# Patient Record
Sex: MALE | Race: BLACK OR AFRICAN AMERICAN | NOT HISPANIC OR LATINO
[De-identification: names, ages, dates, MRNs, and addresses within clinical notes are randomized per-mention and may not be internally consistent; named-entity substitution may affect disease eponyms.]

---

## 2017-01-13 ENCOUNTER — APPOINTMENT (OUTPATIENT)
Dept: INFECTIOUS DISEASE | Facility: CLINIC | Age: 47
End: 2017-01-13

## 2017-01-13 VITALS
SYSTOLIC BLOOD PRESSURE: 128 MMHG | WEIGHT: 225 LBS | RESPIRATION RATE: 16 BRPM | BODY MASS INDEX: 27.98 KG/M2 | HEART RATE: 86 BPM | HEIGHT: 75 IN | DIASTOLIC BLOOD PRESSURE: 79 MMHG | TEMPERATURE: 98.1 F | OXYGEN SATURATION: 98 %

## 2017-01-13 DIAGNOSIS — Z23 ENCOUNTER FOR IMMUNIZATION: ICD-10-CM

## 2017-01-17 LAB
25(OH)D3 SERPL-MCNC: 37.5 NG/ML
ADJUSTED MITOGEN: 7.22 IU/ML
ADJUSTED TB AG: 0.02 IU/ML
ALBUMIN SERPL ELPH-MCNC: 4.8 G/DL
ALP BLD-CCNC: 76 U/L
ALT SERPL-CCNC: 16 U/L
ANION GAP SERPL CALC-SCNC: 12 MMOL/L
APPEARANCE: ABNORMAL
AST SERPL-CCNC: 21 U/L
BACTERIA: NEGATIVE
BASOPHILS # BLD AUTO: 0.02 K/UL
BASOPHILS NFR BLD AUTO: 0.6 %
BILIRUB SERPL-MCNC: 0.5 MG/DL
BILIRUBIN URINE: NEGATIVE
BLOOD URINE: NEGATIVE
BUN SERPL-MCNC: 7 MG/DL
CALCIUM SERPL-MCNC: 10.1 MG/DL
CD3 CELLS # BLD: 1410 /UL
CD3 CELLS NFR BLD: 72 %
CD3+CD4+ CELLS # BLD: 324 /UL
CD3+CD4+ CELLS NFR BLD: 16 %
CD3+CD4+ CELLS/CD3+CD8+ CLL SPEC: 0.32 RATIO
CD3+CD8+ CELLS # SPEC: 1014 /UL
CD3+CD8+ CELLS NFR BLD: 52 %
CHLORIDE SERPL-SCNC: 102 MMOL/L
CHOLEST SERPL-MCNC: 278 MG/DL
CHOLEST/HDLC SERPL: 6.5 RATIO
CO2 SERPL-SCNC: 27 MMOL/L
COLOR: YELLOW
CREAT SERPL-MCNC: 1.34 MG/DL
EOSINOPHIL # BLD AUTO: 0.11 K/UL
EOSINOPHIL NFR BLD AUTO: 3.2 %
ERYTHROCYTE [SEDIMENTATION RATE] IN BLOOD BY WESTERGREN METHOD: 14 MM/HR
G6PD SER-CCNC: 8.5 U/G HB
GLUCOSE QUALITATIVE U: NORMAL MG/DL
GLUCOSE SERPL-MCNC: 95 MG/DL
HBA1C MFR BLD HPLC: 4.8 %
HBV SURFACE AB SER QL: NONREACTIVE
HCT VFR BLD CALC: 37.9 %
HDLC SERPL-MCNC: 43 MG/DL
HGB BLD-MCNC: 12.5 G/DL
HIV1 RNA # SERPL NAA+PROBE: NOT DETECTED COPIES/ML
HYALINE CASTS: 0 /LPF
IMM GRANULOCYTES NFR BLD AUTO: 0.3 %
KETONES URINE: NEGATIVE
LDLC SERPL CALC-MCNC: 183 MG/DL
LEUKOCYTE ESTERASE URINE: NEGATIVE
LYMPHOCYTES # BLD AUTO: 1.95 K/UL
LYMPHOCYTES NFR BLD AUTO: 56.4 %
M TB IFN-G BLD-IMP: NEGATIVE
MAN DIFF?: NORMAL
MCHC RBC-ENTMCNC: 29.3 PG
MCHC RBC-ENTMCNC: 33 GM/DL
MCV RBC AUTO: 88.8 FL
MICROSCOPIC-UA: NORMAL
MONOCYTES # BLD AUTO: 0.26 K/UL
MONOCYTES NFR BLD AUTO: 7.5 %
NEUTROPHILS # BLD AUTO: 1.11 K/UL
NEUTROPHILS NFR BLD AUTO: 32 %
NITRITE URINE: NEGATIVE
PH URINE: 6
PLATELET # BLD AUTO: 266 K/UL
POTASSIUM SERPL-SCNC: 3.8 MMOL/L
PROT SERPL-MCNC: 8.6 G/DL
PROTEIN URINE: NEGATIVE MG/DL
PSA SERPL-MCNC: 0.83 NG/ML
QUANTIFERON GOLD NIL: 0.06 IU/ML
RBC # BLD: 4.27 M/UL
RBC # FLD: 13.5 %
RED BLOOD CELLS URINE: 1 /HPF
RHEUMATOID FACT SER QL: <7 IU/ML
SODIUM SERPL-SCNC: 141 MMOL/L
SPECIFIC GRAVITY URINE: 1.01
SQUAMOUS EPITHELIAL CELLS: 1 /HPF
T PALLIDUM AB SER QL IA: NEGATIVE
TRIGL SERPL-MCNC: 262 MG/DL
UROBILINOGEN URINE: NORMAL MG/DL
VIRAL LOAD LOG: NOT DETECTED LG10COP/ML
WBC # FLD AUTO: 3.46 K/UL
WHITE BLOOD CELLS URINE: 1 /HPF

## 2017-01-26 ENCOUNTER — APPOINTMENT (OUTPATIENT)
Dept: ORTHOPEDIC SURGERY | Facility: CLINIC | Age: 47
End: 2017-01-26

## 2017-02-14 ENCOUNTER — APPOINTMENT (OUTPATIENT)
Dept: ULTRASOUND IMAGING | Facility: CLINIC | Age: 47
End: 2017-02-14

## 2017-02-17 ENCOUNTER — APPOINTMENT (OUTPATIENT)
Dept: INFECTIOUS DISEASE | Facility: CLINIC | Age: 47
End: 2017-02-17

## 2017-02-17 ENCOUNTER — APPOINTMENT (OUTPATIENT)
Dept: ORTHOPEDIC SURGERY | Facility: CLINIC | Age: 47
End: 2017-02-17

## 2017-02-17 ENCOUNTER — RX RENEWAL (OUTPATIENT)
Age: 47
End: 2017-02-17

## 2017-02-17 VITALS
WEIGHT: 225 LBS | OXYGEN SATURATION: 96 % | SYSTOLIC BLOOD PRESSURE: 136 MMHG | DIASTOLIC BLOOD PRESSURE: 88 MMHG | HEART RATE: 95 BPM | BODY MASS INDEX: 27.98 KG/M2 | TEMPERATURE: 98.1 F | HEIGHT: 75 IN

## 2017-02-17 VITALS
HEART RATE: 89 BPM | BODY MASS INDEX: 27.98 KG/M2 | HEIGHT: 75 IN | SYSTOLIC BLOOD PRESSURE: 138 MMHG | WEIGHT: 225 LBS | DIASTOLIC BLOOD PRESSURE: 88 MMHG

## 2017-02-17 DIAGNOSIS — Z80.7 FAMILY HISTORY OF OTHER MALIGNANT NEOPLASMS OF LYMPHOID, HEMATOPOIETIC AND RELATED TISSUES: ICD-10-CM

## 2017-02-17 DIAGNOSIS — Z82.61 FAMILY HISTORY OF ARTHRITIS: ICD-10-CM

## 2017-02-17 DIAGNOSIS — Z78.9 OTHER SPECIFIED HEALTH STATUS: ICD-10-CM

## 2017-02-21 LAB
ALBUMIN SERPL ELPH-MCNC: 4.6 G/DL
ALP BLD-CCNC: 63 U/L
ALT SERPL-CCNC: 22 U/L
ANION GAP SERPL CALC-SCNC: 14 MMOL/L
AST SERPL-CCNC: 27 U/L
BASOPHILS # BLD AUTO: 0.02 K/UL
BASOPHILS NFR BLD AUTO: 0.5 %
BILIRUB SERPL-MCNC: 0.6 MG/DL
BUN SERPL-MCNC: 8 MG/DL
CALCIUM SERPL-MCNC: 9.6 MG/DL
CHLORIDE SERPL-SCNC: 102 MMOL/L
CO2 SERPL-SCNC: 24 MMOL/L
CREAT SERPL-MCNC: 1.21 MG/DL
EOSINOPHIL # BLD AUTO: 0.14 K/UL
EOSINOPHIL NFR BLD AUTO: 3.7 %
GLUCOSE SERPL-MCNC: 97 MG/DL
HCT VFR BLD CALC: 36.6 %
HGB BLD-MCNC: 12 G/DL
HIV1 RNA # SERPL NAA+PROBE: NOT DETECTED COPIES/ML
IMM GRANULOCYTES NFR BLD AUTO: 0.3 %
LYMPHOCYTES # BLD AUTO: 2.2 K/UL
LYMPHOCYTES NFR BLD AUTO: 57.9 %
MAN DIFF?: NORMAL
MCHC RBC-ENTMCNC: 29.1 PG
MCHC RBC-ENTMCNC: 32.8 GM/DL
MCV RBC AUTO: 88.6 FL
MONOCYTES # BLD AUTO: 0.28 K/UL
MONOCYTES NFR BLD AUTO: 7.4 %
NEUTROPHILS # BLD AUTO: 1.15 K/UL
NEUTROPHILS NFR BLD AUTO: 30.2 %
PLATELET # BLD AUTO: 262 K/UL
POTASSIUM SERPL-SCNC: 3.6 MMOL/L
PROT SERPL-MCNC: 8.2 G/DL
RBC # BLD: 4.13 M/UL
RBC # FLD: 13.8 %
SODIUM SERPL-SCNC: 140 MMOL/L
VIRAL LOAD LOG: NOT DETECTED LG10COP/ML
WBC # FLD AUTO: 3.8 K/UL

## 2017-02-23 LAB
CD3 CELLS # BLD: 1591 /UL
CD3 CELLS NFR BLD: 73 %
CD3+CD4+ CELLS # BLD: 343 /UL
CD3+CD4+ CELLS NFR BLD: 16 %
CD3+CD4+ CELLS/CD3+CD8+ CLL SPEC: 0.3 RATIO
CD3+CD8+ CELLS # SPEC: 1164 /UL
CD3+CD8+ CELLS NFR BLD: 53 %

## 2017-03-20 ENCOUNTER — RX RENEWAL (OUTPATIENT)
Age: 47
End: 2017-03-20

## 2017-05-12 ENCOUNTER — APPOINTMENT (OUTPATIENT)
Dept: ORTHOPEDIC SURGERY | Facility: CLINIC | Age: 47
End: 2017-05-12

## 2017-05-26 ENCOUNTER — APPOINTMENT (OUTPATIENT)
Dept: INFECTIOUS DISEASE | Facility: CLINIC | Age: 47
End: 2017-05-26

## 2017-05-26 ENCOUNTER — RX RENEWAL (OUTPATIENT)
Age: 47
End: 2017-05-26

## 2017-05-26 VITALS
HEART RATE: 99 BPM | WEIGHT: 230 LBS | BODY MASS INDEX: 28.6 KG/M2 | OXYGEN SATURATION: 98 % | DIASTOLIC BLOOD PRESSURE: 109 MMHG | HEIGHT: 75 IN | SYSTOLIC BLOOD PRESSURE: 174 MMHG | TEMPERATURE: 98.3 F

## 2017-05-30 ENCOUNTER — RX RENEWAL (OUTPATIENT)
Age: 47
End: 2017-05-30

## 2017-05-30 LAB
ADJUSTED MITOGEN: >10 IU/ML
ADJUSTED TB AG: 0 IU/ML
ALBUMIN SERPL ELPH-MCNC: 4.7 G/DL
ALP BLD-CCNC: 64 U/L
ALT SERPL-CCNC: 37 U/L
ANION GAP SERPL CALC-SCNC: 20 MMOL/L
AST SERPL-CCNC: 26 U/L
BASOPHILS # BLD AUTO: 0.02 K/UL
BASOPHILS NFR BLD AUTO: 0.6 %
BILIRUB SERPL-MCNC: 0.3 MG/DL
BUN SERPL-MCNC: 7 MG/DL
C TRACH RRNA SPEC QL NAA+PROBE: NORMAL
CALCIUM SERPL-MCNC: 10.1 MG/DL
CD3 CELLS # BLD: 1492 /UL
CD3 CELLS NFR BLD: 75 %
CD3+CD4+ CELLS # BLD: 386 /UL
CD3+CD4+ CELLS NFR BLD: 19 %
CD3+CD4+ CELLS/CD3+CD8+ CLL SPEC: 0.37 RATIO
CD3+CD8+ CELLS # SPEC: 1031 /UL
CD3+CD8+ CELLS NFR BLD: 52 %
CHLORIDE SERPL-SCNC: 104 MMOL/L
CHOLEST SERPL-MCNC: 246 MG/DL
CHOLEST/HDLC SERPL: 7.5 RATIO
CO2 SERPL-SCNC: 21 MMOL/L
CREAT SERPL-MCNC: 1.41 MG/DL
EOSINOPHIL # BLD AUTO: 0.11 K/UL
EOSINOPHIL NFR BLD AUTO: 3.3 %
GLUCOSE SERPL-MCNC: 97 MG/DL
HCT VFR BLD CALC: 41.3 %
HDLC SERPL-MCNC: 33 MG/DL
HGB BLD-MCNC: 13.6 G/DL
HIV1 RNA # SERPL NAA+PROBE: NORMAL COPIES/ML
IMM GRANULOCYTES NFR BLD AUTO: 0 %
LDLC SERPL CALC-MCNC: NORMAL MG/DL
LYMPHOCYTES # BLD AUTO: 1.88 K/UL
LYMPHOCYTES NFR BLD AUTO: 56 %
M TB IFN-G BLD-IMP: NEGATIVE
MAN DIFF?: NORMAL
MCHC RBC-ENTMCNC: 27.7 PG
MCHC RBC-ENTMCNC: 32.9 GM/DL
MCV RBC AUTO: 84.1 FL
MONOCYTES # BLD AUTO: 0.23 K/UL
MONOCYTES NFR BLD AUTO: 6.8 %
N GONORRHOEA RRNA SPEC QL NAA+PROBE: NORMAL
NEUTROPHILS # BLD AUTO: 1.12 K/UL
NEUTROPHILS NFR BLD AUTO: 33.3 %
PLATELET # BLD AUTO: 240 K/UL
POTASSIUM SERPL-SCNC: 4.2 MMOL/L
PROT SERPL-MCNC: 8.7 G/DL
PSA SERPL-MCNC: 0.57 NG/ML
QUANTIFERON GOLD NIL: 0.04 IU/ML
RBC # BLD: 4.91 M/UL
RBC # FLD: 13.2 %
SODIUM SERPL-SCNC: 145 MMOL/L
SOURCE AMPLIFICATION: NORMAL
T PALLIDUM AB SER QL IA: NEGATIVE
TRIGL SERPL-MCNC: 461 MG/DL
VIRAL LOAD LOG: <1.6 LG10COP/ML
WBC # FLD AUTO: 3.36 K/UL

## 2017-06-20 ENCOUNTER — APPOINTMENT (OUTPATIENT)
Dept: INFECTIOUS DISEASE | Facility: CLINIC | Age: 47
End: 2017-06-20

## 2017-07-10 ENCOUNTER — RX RENEWAL (OUTPATIENT)
Age: 47
End: 2017-07-10

## 2017-07-12 ENCOUNTER — RX RENEWAL (OUTPATIENT)
Age: 47
End: 2017-07-12

## 2017-07-20 ENCOUNTER — RX RENEWAL (OUTPATIENT)
Age: 47
End: 2017-07-20

## 2017-08-09 ENCOUNTER — RX RENEWAL (OUTPATIENT)
Age: 47
End: 2017-08-09

## 2017-08-16 ENCOUNTER — RX RENEWAL (OUTPATIENT)
Age: 47
End: 2017-08-16

## 2017-10-11 ENCOUNTER — RX RENEWAL (OUTPATIENT)
Age: 47
End: 2017-10-11

## 2017-11-16 ENCOUNTER — APPOINTMENT (OUTPATIENT)
Dept: INFECTIOUS DISEASE | Facility: CLINIC | Age: 47
End: 2017-11-16
Payer: COMMERCIAL

## 2017-11-16 VITALS
OXYGEN SATURATION: 100 % | HEART RATE: 96 BPM | WEIGHT: 232 LBS | DIASTOLIC BLOOD PRESSURE: 89 MMHG | BODY MASS INDEX: 29 KG/M2 | TEMPERATURE: 98.8 F | RESPIRATION RATE: 18 BRPM | SYSTOLIC BLOOD PRESSURE: 134 MMHG

## 2017-11-16 DIAGNOSIS — R79.89 OTHER SPECIFIED ABNORMAL FINDINGS OF BLOOD CHEMISTRY: ICD-10-CM

## 2017-11-16 PROCEDURE — 90686 IIV4 VACC NO PRSV 0.5 ML IM: CPT

## 2017-11-16 PROCEDURE — G0008: CPT

## 2017-11-16 PROCEDURE — 99214 OFFICE O/P EST MOD 30 MIN: CPT

## 2017-11-17 ENCOUNTER — MED ADMIN CHARGE (OUTPATIENT)
Age: 47
End: 2017-11-17

## 2017-11-17 LAB
25(OH)D3 SERPL-MCNC: 40.6 NG/ML
ALBUMIN SERPL ELPH-MCNC: 4.5 G/DL
ALP BLD-CCNC: 57 U/L
ALT SERPL-CCNC: 20 U/L
ANION GAP SERPL CALC-SCNC: 12 MMOL/L
APPEARANCE: CLEAR
AST SERPL-CCNC: 26 U/L
BACTERIA: NEGATIVE
BASOPHILS # BLD AUTO: 0.02 K/UL
BASOPHILS NFR BLD AUTO: 0.5 %
BILIRUB SERPL-MCNC: 0.4 MG/DL
BILIRUBIN URINE: NEGATIVE
BLOOD URINE: NEGATIVE
BUN SERPL-MCNC: 11 MG/DL
C TRACH RRNA SPEC QL NAA+PROBE: NOT DETECTED
CALCIUM SERPL-MCNC: 9.3 MG/DL
CD3 CELLS # BLD: 1676 /UL
CD3 CELLS NFR BLD: 71 %
CD3+CD4+ CELLS # BLD: 487 /UL
CD3+CD4+ CELLS NFR BLD: 21 %
CD3+CD4+ CELLS/CD3+CD8+ CLL SPEC: 0.45 RATIO
CD3+CD8+ CELLS # SPEC: 1093 /UL
CD3+CD8+ CELLS NFR BLD: 46 %
CHLORIDE SERPL-SCNC: 103 MMOL/L
CO2 SERPL-SCNC: 27 MMOL/L
COLOR: YELLOW
CREAT SERPL-MCNC: 1.17 MG/DL
EOSINOPHIL # BLD AUTO: 0.09 K/UL
EOSINOPHIL NFR BLD AUTO: 2.3 %
GLUCOSE QUALITATIVE U: NEGATIVE MG/DL
GLUCOSE SERPL-MCNC: 98 MG/DL
HBA1C MFR BLD HPLC: 5.2 %
HBV SURFACE AB SER QL: NONREACTIVE
HBV SURFACE AG SER QL: NONREACTIVE
HCT VFR BLD CALC: 36.4 %
HGB BLD-MCNC: 12.1 G/DL
HIV1 RNA # SERPL NAA+PROBE: ABNORMAL
HIV1 RNA # SERPL NAA+PROBE: ABNORMAL COPIES/ML
HYALINE CASTS: 1 /LPF
IMM GRANULOCYTES NFR BLD AUTO: 0 %
KETONES URINE: NEGATIVE
LEUKOCYTE ESTERASE URINE: NEGATIVE
LYMPHOCYTES # BLD AUTO: 2.06 K/UL
LYMPHOCYTES NFR BLD AUTO: 52.7 %
MAN DIFF?: NORMAL
MCHC RBC-ENTMCNC: 28.7 PG
MCHC RBC-ENTMCNC: 33.2 GM/DL
MCV RBC AUTO: 86.5 FL
MICROSCOPIC-UA: NORMAL
MONOCYTES # BLD AUTO: 0.35 K/UL
MONOCYTES NFR BLD AUTO: 9 %
N GONORRHOEA RRNA SPEC QL NAA+PROBE: NOT DETECTED
NEUTROPHILS # BLD AUTO: 1.39 K/UL
NEUTROPHILS NFR BLD AUTO: 35.5 %
NITRITE URINE: NEGATIVE
PH URINE: 6
PLATELET # BLD AUTO: 229 K/UL
POTASSIUM SERPL-SCNC: 3.7 MMOL/L
PROT SERPL-MCNC: 8 G/DL
PROTEIN URINE: NEGATIVE MG/DL
RBC # BLD: 4.21 M/UL
RBC # FLD: 12.8 %
RED BLOOD CELLS URINE: 1 /HPF
SODIUM SERPL-SCNC: 142 MMOL/L
SOURCE AMPLIFICATION: NORMAL
SPECIFIC GRAVITY URINE: 1.01
SQUAMOUS EPITHELIAL CELLS: 1 /HPF
T PALLIDUM AB SER QL IA: NEGATIVE
UROBILINOGEN URINE: NEGATIVE MG/DL
VIRAL LOAD INTERP: NORMAL
VIRAL LOAD LOG: ABNORMAL LG COP/ML
WBC # FLD AUTO: 3.91 K/UL
WHITE BLOOD CELLS URINE: 0 /HPF

## 2017-12-14 ENCOUNTER — TRANSCRIPTION ENCOUNTER (OUTPATIENT)
Age: 47
End: 2017-12-14

## 2017-12-14 ENCOUNTER — RX RENEWAL (OUTPATIENT)
Age: 47
End: 2017-12-14

## 2017-12-15 ENCOUNTER — RX RENEWAL (OUTPATIENT)
Age: 47
End: 2017-12-15

## 2018-02-15 ENCOUNTER — RX RENEWAL (OUTPATIENT)
Age: 48
End: 2018-02-15

## 2018-04-12 ENCOUNTER — RX RENEWAL (OUTPATIENT)
Age: 48
End: 2018-04-12

## 2018-05-14 ENCOUNTER — RX RENEWAL (OUTPATIENT)
Age: 48
End: 2018-05-14

## 2018-05-18 ENCOUNTER — APPOINTMENT (OUTPATIENT)
Dept: INFECTIOUS DISEASE | Facility: CLINIC | Age: 48
End: 2018-05-18
Payer: COMMERCIAL

## 2018-05-18 VITALS
BODY MASS INDEX: 29.22 KG/M2 | RESPIRATION RATE: 18 BRPM | WEIGHT: 235 LBS | DIASTOLIC BLOOD PRESSURE: 99 MMHG | HEART RATE: 100 BPM | OXYGEN SATURATION: 97 % | SYSTOLIC BLOOD PRESSURE: 158 MMHG | HEIGHT: 75 IN | TEMPERATURE: 98.8 F

## 2018-05-18 DIAGNOSIS — G57.93 UNSPECIFIED MONONEUROPATHY OF BILATERAL LOWER LIMBS: ICD-10-CM

## 2018-05-18 LAB
APPEARANCE: CLEAR
BACTERIA: NEGATIVE
BILIRUBIN URINE: NEGATIVE
BLOOD URINE: NEGATIVE
COLOR: ABNORMAL
GLUCOSE QUALITATIVE U: NEGATIVE MG/DL
HYALINE CASTS: 0 /LPF
KETONES URINE: NEGATIVE
LEUKOCYTE ESTERASE URINE: NEGATIVE
MICROSCOPIC-UA: NORMAL
NITRITE URINE: NEGATIVE
PH URINE: 7
PROTEIN URINE: NEGATIVE MG/DL
RED BLOOD CELLS URINE: 0 /HPF
SPECIFIC GRAVITY URINE: 1.01
SQUAMOUS EPITHELIAL CELLS: 0 /HPF
UROBILINOGEN URINE: NEGATIVE MG/DL
WHITE BLOOD CELLS URINE: 0 /HPF

## 2018-05-18 PROCEDURE — 99214 OFFICE O/P EST MOD 30 MIN: CPT | Mod: 25

## 2018-05-18 PROCEDURE — 90740 HEPB VACC 3 DOSE IMMUNSUP IM: CPT

## 2018-05-18 PROCEDURE — G0010: CPT

## 2018-05-23 LAB
25(OH)D3 SERPL-MCNC: 32.5 NG/ML
ALBUMIN SERPL ELPH-MCNC: 4.8 G/DL
ALP BLD-CCNC: 53 U/L
ALT SERPL-CCNC: 35 U/L
ANION GAP SERPL CALC-SCNC: 13 MMOL/L
AST SERPL-CCNC: 35 U/L
BASOPHILS # BLD AUTO: 0.02 K/UL
BASOPHILS NFR BLD AUTO: 0.5 %
BILIRUB SERPL-MCNC: 0.5 MG/DL
BUN SERPL-MCNC: 13 MG/DL
C TRACH RRNA SPEC QL NAA+PROBE: NOT DETECTED
CALCIUM SERPL-MCNC: 10.2 MG/DL
CD3 CELLS # BLD: 1509 /UL
CD3 CELLS NFR BLD: 70 %
CD3+CD4+ CELLS # BLD: 459 /UL
CD3+CD4+ CELLS NFR BLD: 21 %
CD3+CD4+ CELLS/CD3+CD8+ CLL SPEC: 0.46 RATIO
CD3+CD8+ CELLS # SPEC: 999 /UL
CD3+CD8+ CELLS NFR BLD: 46 %
CHLORIDE SERPL-SCNC: 103 MMOL/L
CHOLEST SERPL-MCNC: 299 MG/DL
CHOLEST/HDLC SERPL: 6.8 RATIO
CO2 SERPL-SCNC: 28 MMOL/L
CREAT SERPL-MCNC: 1.31 MG/DL
EOSINOPHIL # BLD AUTO: 0.1 K/UL
EOSINOPHIL NFR BLD AUTO: 2.4 %
GLUCOSE SERPL-MCNC: 95 MG/DL
HBA1C MFR BLD HPLC: 5.3 %
HBV CORE IGG+IGM SER QL: NONREACTIVE
HBV SURFACE AB SER QL: NONREACTIVE
HCT VFR BLD CALC: 36 %
HDLC SERPL-MCNC: 44 MG/DL
HGB BLD-MCNC: 12.4 G/DL
HIV1 RNA # SERPL NAA+PROBE: 53
HIV1 RNA # SERPL NAA+PROBE: ABNORMAL
IMM GRANULOCYTES NFR BLD AUTO: 0.2 %
LDLC SERPL CALC-MCNC: NORMAL
LYMPHOCYTES # BLD AUTO: 2.17 K/UL
LYMPHOCYTES NFR BLD AUTO: 52.7 %
MAN DIFF?: NORMAL
MCHC RBC-ENTMCNC: 29 PG
MCHC RBC-ENTMCNC: 34.4 GM/DL
MCV RBC AUTO: 84.3 FL
MONOCYTES # BLD AUTO: 0.39 K/UL
MONOCYTES NFR BLD AUTO: 9.5 %
N GONORRHOEA RRNA SPEC QL NAA+PROBE: NOT DETECTED
NEUTROPHILS # BLD AUTO: 1.43 K/UL
NEUTROPHILS NFR BLD AUTO: 34.7 %
PLATELET # BLD AUTO: 270 K/UL
POTASSIUM SERPL-SCNC: 4.1 MMOL/L
PROT SERPL-MCNC: 8.2 G/DL
RBC # BLD: 4.27 M/UL
RBC # FLD: 13.6 %
SODIUM SERPL-SCNC: 144 MMOL/L
SOURCE AMPLIFICATION: NORMAL
T PALLIDUM AB SER QL IA: NEGATIVE
TRIGL SERPL-MCNC: 407 MG/DL
VIRAL LOAD INTERP: NORMAL
VIRAL LOAD LOG: 1.73
WBC # FLD AUTO: 4.12 K/UL

## 2018-06-08 ENCOUNTER — RX RENEWAL (OUTPATIENT)
Age: 48
End: 2018-06-08

## 2018-06-13 ENCOUNTER — LABORATORY RESULT (OUTPATIENT)
Age: 48
End: 2018-06-13

## 2018-06-13 ENCOUNTER — MED ADMIN CHARGE (OUTPATIENT)
Age: 48
End: 2018-06-13

## 2018-06-13 ENCOUNTER — APPOINTMENT (OUTPATIENT)
Dept: INFECTIOUS DISEASE | Facility: CLINIC | Age: 48
End: 2018-06-13
Payer: COMMERCIAL

## 2018-06-13 VITALS — DIASTOLIC BLOOD PRESSURE: 86 MMHG | HEART RATE: 88 BPM | SYSTOLIC BLOOD PRESSURE: 142 MMHG

## 2018-06-13 PROCEDURE — 90740 HEPB VACC 3 DOSE IMMUNSUP IM: CPT

## 2018-06-13 PROCEDURE — G0010: CPT

## 2018-07-05 ENCOUNTER — RX RENEWAL (OUTPATIENT)
Age: 48
End: 2018-07-05

## 2018-08-17 ENCOUNTER — RX RENEWAL (OUTPATIENT)
Age: 48
End: 2018-08-17

## 2018-08-28 ENCOUNTER — APPOINTMENT (OUTPATIENT)
Dept: NEUROLOGY | Facility: CLINIC | Age: 48
End: 2018-08-28

## 2018-09-21 ENCOUNTER — APPOINTMENT (OUTPATIENT)
Dept: INFECTIOUS DISEASE | Facility: CLINIC | Age: 48
End: 2018-09-21

## 2018-09-21 ENCOUNTER — OUTPATIENT (OUTPATIENT)
Dept: OUTPATIENT SERVICES | Facility: HOSPITAL | Age: 48
LOS: 1 days | End: 2018-09-21
Payer: COMMERCIAL

## 2018-09-21 VITALS
TEMPERATURE: 98.2 F | SYSTOLIC BLOOD PRESSURE: 151 MMHG | WEIGHT: 234 LBS | OXYGEN SATURATION: 98 % | DIASTOLIC BLOOD PRESSURE: 90 MMHG | BODY MASS INDEX: 29.09 KG/M2 | HEART RATE: 98 BPM | HEIGHT: 75 IN

## 2018-09-21 DIAGNOSIS — B20 HUMAN IMMUNODEFICIENCY VIRUS [HIV] DISEASE: ICD-10-CM

## 2018-09-21 DIAGNOSIS — Z78.9 OTHER SPECIFIED HEALTH STATUS: ICD-10-CM

## 2018-09-21 PROCEDURE — G0463: CPT

## 2018-10-02 DIAGNOSIS — R68.82 DECREASED LIBIDO: ICD-10-CM

## 2018-10-03 LAB
25(OH)D3 SERPL-MCNC: 37.3 NG/ML
ALBUMIN SERPL ELPH-MCNC: 4.7 G/DL
ALP BLD-CCNC: 58 U/L
ALT SERPL-CCNC: 29 U/L
ANION GAP SERPL CALC-SCNC: 11 MMOL/L
APPEARANCE: CLEAR
AST SERPL-CCNC: 33 U/L
BACTERIA: NEGATIVE
BASOPHILS # BLD AUTO: 0.02 K/UL
BASOPHILS NFR BLD AUTO: 0.4 %
BILIRUB SERPL-MCNC: 0.4 MG/DL
BILIRUBIN URINE: NEGATIVE
BLOOD URINE: NEGATIVE
BUN SERPL-MCNC: 9 MG/DL
C TRACH RRNA SPEC QL NAA+PROBE: NOT DETECTED
CALCIUM SERPL-MCNC: 9.7 MG/DL
CD3 CELLS # BLD: 1513 /UL
CD3 CELLS NFR BLD: 67 %
CD3+CD4+ CELLS # BLD: 447 /UL
CD3+CD4+ CELLS NFR BLD: 20 %
CD3+CD4+ CELLS/CD3+CD8+ CLL SPEC: 0.46 RATIO
CD3+CD8+ CELLS # SPEC: 973 /UL
CD3+CD8+ CELLS NFR BLD: 43 %
CHLORIDE SERPL-SCNC: 102 MMOL/L
CHOLEST SERPL-MCNC: 232 MG/DL
CHOLEST/HDLC SERPL: 5.5 RATIO
CO2 SERPL-SCNC: 28 MMOL/L
COLOR: ABNORMAL
CORE LAB FLUID CYTOLOGY: NORMAL
CREAT SERPL-MCNC: 1.69 MG/DL
EOSINOPHIL # BLD AUTO: 0.09 K/UL
EOSINOPHIL NFR BLD AUTO: 1.8 %
FOLATE SERPL-MCNC: 17.8 NG/ML
GLUCOSE QUALITATIVE U: NEGATIVE MG/DL
GLUCOSE SERPL-MCNC: 97 MG/DL
HBA1C MFR BLD HPLC: 5.4 %
HCT VFR BLD CALC: 38.5 %
HDLC SERPL-MCNC: 42 MG/DL
HGB BLD-MCNC: 12.9 G/DL
HIV1 RNA # SERPL NAA+PROBE: ABNORMAL
HIV1 RNA # SERPL NAA+PROBE: ABNORMAL COPIES/ML
HPV DNA, HIGH RISK, LOW RISK, ANAL: DETECTED
HYALINE CASTS: 2 /LPF
IMM GRANULOCYTES NFR BLD AUTO: 0.2 %
KETONES URINE: NEGATIVE
LDLC SERPL CALC-MCNC: 139 MG/DL
LEUKOCYTE ESTERASE URINE: NEGATIVE
LYMPHOCYTES # BLD AUTO: 2.12 K/UL
LYMPHOCYTES NFR BLD AUTO: 42.4 %
M TB IFN-G BLD-IMP: NEGATIVE
MAN DIFF?: NORMAL
MCHC RBC-ENTMCNC: 29.2 PG
MCHC RBC-ENTMCNC: 33.5 GM/DL
MCV RBC AUTO: 87.1 FL
MICROSCOPIC-UA: NORMAL
MONOCYTES # BLD AUTO: 0.32 K/UL
MONOCYTES NFR BLD AUTO: 6.4 %
N GONORRHOEA RRNA SPEC QL NAA+PROBE: NOT DETECTED
NEUTROPHILS # BLD AUTO: 2.44 K/UL
NEUTROPHILS NFR BLD AUTO: 48.8 %
NITRITE URINE: NEGATIVE
PH URINE: 6
PLATELET # BLD AUTO: 252 K/UL
POTASSIUM SERPL-SCNC: 3.7 MMOL/L
PROT SERPL-MCNC: 8.2 G/DL
PROTEIN URINE: NEGATIVE MG/DL
PSA SERPL-MCNC: 1.19 NG/ML
QUANTIFERON TB PLUS MITOGEN MINUS NIL: 5.33 IU/ML
QUANTIFERON TB PLUS NIL: 0.03 IU/ML
QUANTIFERON TB PLUS TB1 MINUS NIL: -0.01 IU/ML
QUANTIFERON TB PLUS TB2 MINUS NIL: 0 IU/ML
RBC # BLD: 4.42 M/UL
RBC # FLD: 13.5 %
RED BLOOD CELLS URINE: 1 /HPF
SODIUM SERPL-SCNC: 141 MMOL/L
SOURCE AMPLIFICATION: NORMAL
SOURCE ANAL: NORMAL
SOURCE ORAL: NORMAL
SPECIFIC GRAVITY URINE: 1.01
SQUAMOUS EPITHELIAL CELLS: 1 /HPF
T PALLIDUM AB SER QL IA: NEGATIVE
TESTOST BND SERPL-MCNC: 4.2 PG/ML
TESTOST SERPL-MCNC: 222.1 NG/DL
TRIGL SERPL-MCNC: 255 MG/DL
TSH SERPL-ACNC: 2.17 UIU/ML
UROBILINOGEN URINE: NEGATIVE MG/DL
VIRAL LOAD INTERP: NORMAL
VIRAL LOAD LOG: ABNORMAL LG COP/ML
VIT B12 SERPL-MCNC: 831 PG/ML
WBC # FLD AUTO: 5 K/UL
WHITE BLOOD CELLS URINE: 1 /HPF

## 2018-10-08 ENCOUNTER — LABORATORY RESULT (OUTPATIENT)
Age: 48
End: 2018-10-08

## 2018-10-08 ENCOUNTER — APPOINTMENT (OUTPATIENT)
Dept: INFECTIOUS DISEASE | Facility: CLINIC | Age: 48
End: 2018-10-08

## 2018-10-08 ENCOUNTER — OUTPATIENT (OUTPATIENT)
Dept: OUTPATIENT SERVICES | Facility: HOSPITAL | Age: 48
LOS: 1 days | End: 2018-10-08

## 2018-10-08 DIAGNOSIS — R68.82 DECREASED LIBIDO: ICD-10-CM

## 2018-10-08 DIAGNOSIS — B20 HUMAN IMMUNODEFICIENCY VIRUS [HIV] DISEASE: ICD-10-CM

## 2018-10-10 ENCOUNTER — APPOINTMENT (OUTPATIENT)
Dept: INFECTIOUS DISEASE | Facility: CLINIC | Age: 48
End: 2018-10-10

## 2018-11-13 ENCOUNTER — RX RENEWAL (OUTPATIENT)
Age: 48
End: 2018-11-13

## 2019-01-11 ENCOUNTER — OUTPATIENT (OUTPATIENT)
Dept: OUTPATIENT SERVICES | Facility: HOSPITAL | Age: 49
LOS: 1 days | End: 2019-01-11
Payer: COMMERCIAL

## 2019-01-11 ENCOUNTER — APPOINTMENT (OUTPATIENT)
Dept: INFECTIOUS DISEASE | Facility: CLINIC | Age: 49
End: 2019-01-11

## 2019-01-11 VITALS
WEIGHT: 238 LBS | BODY MASS INDEX: 29.59 KG/M2 | SYSTOLIC BLOOD PRESSURE: 141 MMHG | HEIGHT: 75 IN | TEMPERATURE: 97.1 F | OXYGEN SATURATION: 98 % | HEART RATE: 93 BPM | RESPIRATION RATE: 14 BRPM | DIASTOLIC BLOOD PRESSURE: 88 MMHG

## 2019-01-11 DIAGNOSIS — B20 HUMAN IMMUNODEFICIENCY VIRUS [HIV] DISEASE: ICD-10-CM

## 2019-01-11 DIAGNOSIS — R21 RASH AND OTHER NONSPECIFIC SKIN ERUPTION: ICD-10-CM

## 2019-01-11 PROCEDURE — G0463: CPT

## 2019-01-11 NOTE — ASSESSMENT
[FreeTextEntry1] : VENU SANDOVAL is a 48 year old male being seen for a follow-up visit. here for 6 month visit. States acid reflux, . Rash needs derm.  Pt states neuropathy on legs..making appt for neurology. Libido issues. Diagnosed with AIDS in 2008. Now Cd4 over 400. Undetrectable VL. . Doing well on Genvoya, Amlodipine increased to 10mg by cardiologist Dr. José Hebert on [facility] [phone]. Pt also had recent stress test. . Pt to continue the Lisinopril-Hydrochlorothiazide 20-12.5 MG Oral Tablet and Vit D3 as well. all labs today...see in 6 months [Treatment Education] : treatment education [Treatment Adherence] : treatment adherence [Drug Interactions / Side Effects] : drug interactions/side effects [Disclosure of Diagnosis] : disclosure of diagnosis [HIV Education] : HIV Education [Anticipatory Guidance] : anticipatory guidance

## 2019-01-11 NOTE — HISTORY OF PRESENT ILLNESS
[FreeTextEntry1] : Reason For Visit\par VENU SANDOVAL is a 48 year old male being seen for a follow-up visit. here for 6 month visit. States acid reflux, . Rash needs derm.  Pt states neuropathy on legs..making appt for neurology. Libido issues. Diagnosed with AIDS in 2008. Now Cd4 over 400. Undetrectable VL. . Doing well on Genvoya, Amlodipine increased to 10mg by cardiologist Dr. José Hebert on [facility] [phone]. Pt also had recent stress test. . Pt to continue the Lisinopril-Hydrochlorothiazide 20-12.5 MG Oral Tablet and Vit D3 as well. all labs today...see in 6 months. \par \par Pt states familal CA history and Multiple myloma. \par \par Sexual History: The patient is sexually active. The patient has anal intercourse with men. He is currently sexually active with 0 male partner(s). 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 The patient is for every encounter. The patient has intercourse with men. \par Travel: no. \par Partner Status: new partner. \par Lives with alone. \par Who is aware of HIV status: partner. 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)\par Hypertension (401.9) (I10)\par Insomnia (780.52) (G47.00)\par Need for Menactra vaccination (V03.89) (Z23)\par Neuropathy involving both lower extremities (356.9) (G57.93)\par Osteoarthritis of both hips (715.95) (M16.0)\par Scoliosis (737.30) (M41.9)\par Sickle cell trait (282.5) (D57.3)\par Sleep apnea (780.57) (G47.30)\par Vitamin D deficiency (268.9) (E55.9)\par \par Past Medical History\par History of blood transfusion (V15.89) (Z92.89)\par History of candidiasis of mouth (V12.09) (Z86.19)\par History of complete eye exam (V15.89) (Z92.89)\par History of dental examination (V15.89) (Z92.89)\par History of gonorrhea (V12.09) (Z86.19)\par History of hemorrhoids (V13.89) (Z87.19)\par History of pneumocystis pneumonia (V12.61) (Z87.01)\par History of Hypophosphatemia (275.3) (E83.39)\par History of Perirectal ulcer (569.41) (K62.6)\par History of Walking pneumonia (486) (J18.9)\par \par Current Meds\par AmLODIPine Besylate 5 MG Oral Tablet; TAKE 1 TABLET DAILY\par Diclofenac Sodium 75 MG Oral Tablet Delayed Release; Take 1 tablet twice daily\par Genvoya [phone] MG Oral Tablet; TAKE 1 TABLET BY MOUTH DAILY\par Lisinopril-Hydrochlorothiazide 20-12.5 MG Oral Tablet; TAKE ONE TABLET BY MOUTH\par DAILY\par ValACYclovir HCl - 1 GM Oral Tablet; TAKE 1 TABLET PO DAILY for prevention. if outbreak\par take 1 tab PO BID until lesions resolve\par Vitamin D (Ergocalciferol) 71680 UNIT Oral Capsule; TAKE 1 CAPSULE BY MOUTH\par EVERY 2 WEEKS\par \par Allergies\par No Known Drug Allergies\par \par dog hair , mold \par

## 2019-01-11 NOTE — PHYSICAL EXAM
[General Appearance - Alert] : alert [General Appearance - In No Acute Distress] : in no acute distress [Sclera] : the sclera and conjunctiva were normal [PERRL With Normal Accommodation] : pupils were equal in size, round, reactive to light [Extraocular Movements] : extraocular movements were intact [Outer Ear] : the ears and nose were normal in appearance [Oropharynx] : the oropharynx was normal with no thrush [Neck Appearance] : the appearance of the neck was normal [Neck Cervical Mass (___cm)] : no neck mass was observed [Jugular Venous Distention Increased] : there was no jugular-venous distention [Thyroid Diffuse Enlargement] : the thyroid was not enlarged [Auscultation Breath Sounds / Voice Sounds] : lungs were clear to auscultation bilaterally [Heart Rate And Rhythm] : heart rate was normal and rhythm regular [Heart Sounds] : normal S1 and S2 [Heart Sounds Gallop] : no gallops [Murmurs] : no murmurs [Heart Sounds Pericardial Friction Rub] : no pericardial rub [Full Pulse] : the pedal pulses are present [Edema] : there was no peripheral edema [Bowel Sounds] : normal bowel sounds [Abdomen Soft] : soft [Abdomen Tenderness] : non-tender [Abdomen Mass (___ Cm)] : no abdominal mass palpated [Costovertebral Angle Tenderness] : no CVA tenderness [No Palpable Adenopathy] : no palpable adenopathy [Musculoskeletal - Swelling] : no joint swelling [Nail Clubbing] : no clubbing  or cyanosis of the fingernails [Motor Tone] : muscle strength and tone were normal [Skin Color & Pigmentation] : normal skin color and pigmentation [] : no rash [Oriented To Time, Place, And Person] : oriented to person, place, and time [Affect] : the affect was normal

## 2019-01-15 LAB
25(OH)D3 SERPL-MCNC: 36.1 NG/ML
ALBUMIN SERPL ELPH-MCNC: 4.8 G/DL
ALP BLD-CCNC: 59 U/L
ALT SERPL-CCNC: 31 U/L
ANION GAP SERPL CALC-SCNC: 12 MMOL/L
APPEARANCE: CLEAR
AST SERPL-CCNC: 30 U/L
BACTERIA: NEGATIVE
BASOPHILS # BLD AUTO: 0.03 K/UL
BASOPHILS NFR BLD AUTO: 0.6 %
BILIRUB SERPL-MCNC: 0.4 MG/DL
BILIRUBIN URINE: NEGATIVE
BLOOD URINE: NEGATIVE
BUN SERPL-MCNC: 10 MG/DL
C TRACH RRNA SPEC QL NAA+PROBE: NOT DETECTED
CALCIUM SERPL-MCNC: 10.1 MG/DL
CD3 CELLS # BLD: 1656 /UL
CD3 CELLS NFR BLD: 69 %
CD3+CD4+ CELLS # BLD: 555 /UL
CD3+CD4+ CELLS NFR BLD: 23 %
CD3+CD4+ CELLS/CD3+CD8+ CLL SPEC: 0.56 RATIO
CD3+CD8+ CELLS # SPEC: 986 /UL
CD3+CD8+ CELLS NFR BLD: 41 %
CHLORIDE SERPL-SCNC: 101 MMOL/L
CHOLEST SERPL-MCNC: 305 MG/DL
CHOLEST/HDLC SERPL: 6.9 RATIO
CO2 SERPL-SCNC: 30 MMOL/L
COLOR: YELLOW
CREAT SERPL-MCNC: 1.12 MG/DL
EOSINOPHIL # BLD AUTO: 0.09 K/UL
EOSINOPHIL NFR BLD AUTO: 1.8 %
GLUCOSE QUALITATIVE U: NEGATIVE MG/DL
GLUCOSE SERPL-MCNC: 84 MG/DL
HBA1C MFR BLD HPLC: 5.6 %
HCT VFR BLD CALC: 39.6 %
HCV AB SER QL: NONREACTIVE
HCV S/CO RATIO: 0.12 S/CO
HDLC SERPL-MCNC: 44 MG/DL
HGB BLD-MCNC: 13.3 G/DL
HIV1 RNA # SERPL NAA+PROBE: NORMAL
HIV1 RNA # SERPL NAA+PROBE: NORMAL COPIES/ML
IMM GRANULOCYTES NFR BLD AUTO: 0.4 %
KETONES URINE: NEGATIVE
LDLC SERPL CALC-MCNC: 210 MG/DL
LEUKOCYTE ESTERASE URINE: NEGATIVE
LYMPHOCYTES # BLD AUTO: 2.88 K/UL
LYMPHOCYTES NFR BLD AUTO: 57.9 %
M TB IFN-G BLD-IMP: NEGATIVE
MAN DIFF?: NORMAL
MCHC RBC-ENTMCNC: 29.2 PG
MCHC RBC-ENTMCNC: 33.6 GM/DL
MCV RBC AUTO: 86.8 FL
MICROSCOPIC-UA: NORMAL
MONOCYTES # BLD AUTO: 0.34 K/UL
MONOCYTES NFR BLD AUTO: 6.8 %
N GONORRHOEA RRNA SPEC QL NAA+PROBE: NOT DETECTED
NEUTROPHILS # BLD AUTO: 1.61 K/UL
NEUTROPHILS NFR BLD AUTO: 32.5 %
NITRITE URINE: NEGATIVE
PH URINE: 6
PLATELET # BLD AUTO: 267 K/UL
POTASSIUM SERPL-SCNC: 3.5 MMOL/L
PROT SERPL-MCNC: 8.3 G/DL
PROTEIN URINE: NEGATIVE MG/DL
PSA SERPL-MCNC: 1.09 NG/ML
QUANTIFERON TB PLUS MITOGEN MINUS NIL: 6.97 IU/ML
QUANTIFERON TB PLUS NIL: 0.03 IU/ML
QUANTIFERON TB PLUS TB1 MINUS NIL: 0 IU/ML
QUANTIFERON TB PLUS TB2 MINUS NIL: -0.01 IU/ML
RBC # BLD: 4.56 M/UL
RBC # FLD: 13.3 %
RED BLOOD CELLS URINE: 1 /HPF
SODIUM SERPL-SCNC: 143 MMOL/L
SOURCE AMPLIFICATION: NORMAL
SPECIFIC GRAVITY URINE: 1.01
SQUAMOUS EPITHELIAL CELLS: 0 /HPF
T PALLIDUM AB SER QL IA: NEGATIVE
TRIGL SERPL-MCNC: 257 MG/DL
UROBILINOGEN URINE: NEGATIVE MG/DL
VIRAL LOAD INTERP: NORMAL
VIRAL LOAD LOG: NORMAL LG COP/ML
WBC # FLD AUTO: 4.97 K/UL
WHITE BLOOD CELLS URINE: 0 /HPF

## 2019-01-16 DIAGNOSIS — J34.2 DEVIATED NASAL SEPTUM: ICD-10-CM

## 2019-01-16 DIAGNOSIS — I10 ESSENTIAL (PRIMARY) HYPERTENSION: ICD-10-CM

## 2019-01-16 DIAGNOSIS — R21 RASH AND OTHER NONSPECIFIC SKIN ERUPTION: ICD-10-CM

## 2019-03-22 ENCOUNTER — APPOINTMENT (OUTPATIENT)
Dept: INFECTIOUS DISEASE | Facility: CLINIC | Age: 49
End: 2019-03-22

## 2019-04-11 ENCOUNTER — RX RENEWAL (OUTPATIENT)
Age: 49
End: 2019-04-11

## 2019-05-01 ENCOUNTER — RX RENEWAL (OUTPATIENT)
Age: 49
End: 2019-05-01

## 2019-06-04 ENCOUNTER — RX RENEWAL (OUTPATIENT)
Age: 49
End: 2019-06-04

## 2019-07-12 ENCOUNTER — OUTPATIENT (OUTPATIENT)
Dept: OUTPATIENT SERVICES | Facility: HOSPITAL | Age: 49
LOS: 1 days | End: 2019-07-12
Payer: COMMERCIAL

## 2019-07-12 ENCOUNTER — APPOINTMENT (OUTPATIENT)
Dept: INFECTIOUS DISEASE | Facility: CLINIC | Age: 49
End: 2019-07-12

## 2019-07-12 VITALS
HEART RATE: 80 BPM | TEMPERATURE: 99.6 F | HEIGHT: 75 IN | OXYGEN SATURATION: 99 % | DIASTOLIC BLOOD PRESSURE: 81 MMHG | BODY MASS INDEX: 28.85 KG/M2 | SYSTOLIC BLOOD PRESSURE: 132 MMHG | WEIGHT: 232 LBS

## 2019-07-12 DIAGNOSIS — B20 HUMAN IMMUNODEFICIENCY VIRUS [HIV] DISEASE: ICD-10-CM

## 2019-07-12 DIAGNOSIS — K21.9 GASTRO-ESOPHAGEAL REFLUX DISEASE WITHOUT ESOPHAGITIS: ICD-10-CM

## 2019-07-12 LAB
ALBUMIN SERPL ELPH-MCNC: 5.1 G/DL
ALP BLD-CCNC: 58 U/L
ALT SERPL-CCNC: 29 U/L
ANION GAP SERPL CALC-SCNC: 10 MMOL/L
APPEARANCE: CLEAR
AST SERPL-CCNC: 27 U/L
BACTERIA: NEGATIVE
BASOPHILS # BLD AUTO: 0.04 K/UL
BASOPHILS NFR BLD AUTO: 0.9 %
BILIRUB SERPL-MCNC: 0.5 MG/DL
BILIRUBIN URINE: NEGATIVE
BLOOD URINE: NEGATIVE
BUN SERPL-MCNC: 13 MG/DL
CALCIUM SERPL-MCNC: 9.8 MG/DL
CD3 CELLS # BLD: 1488 /UL
CD3 CELLS NFR BLD: 71 %
CD3+CD4+ CELLS # BLD: 498 /UL
CD3+CD4+ CELLS NFR BLD: 24 %
CD3+CD4+ CELLS/CD3+CD8+ CLL SPEC: 0.54 RATIO
CD3+CD8+ CELLS # SPEC: 918 /UL
CD3+CD8+ CELLS NFR BLD: 44 %
CHLORIDE SERPL-SCNC: 102 MMOL/L
CHOLEST SERPL-MCNC: 285 MG/DL
CHOLEST/HDLC SERPL: 6.5 RATIO
CO2 SERPL-SCNC: 31 MMOL/L
COLOR: NORMAL
CREAT SERPL-MCNC: 1.4 MG/DL
EOSINOPHIL # BLD AUTO: 0.08 K/UL
EOSINOPHIL NFR BLD AUTO: 1.8 %
ESTIMATED AVERAGE GLUCOSE: 111 MG/DL
GLUCOSE QUALITATIVE U: NEGATIVE
GLUCOSE SERPL-MCNC: 95 MG/DL
HBA1C MFR BLD HPLC: 5.5 %
HCT VFR BLD CALC: 39 %
HDLC SERPL-MCNC: 44 MG/DL
HGB BLD-MCNC: 12.9 G/DL
HYALINE CASTS: 1 /LPF
IMM GRANULOCYTES NFR BLD AUTO: 0.2 %
KETONES URINE: NEGATIVE
LDLC SERPL CALC-MCNC: 196 MG/DL
LEUKOCYTE ESTERASE URINE: NEGATIVE
LYMPHOCYTES # BLD AUTO: 2.43 K/UL
LYMPHOCYTES NFR BLD AUTO: 54.6 %
MAN DIFF?: NORMAL
MCHC RBC-ENTMCNC: 29 PG
MCHC RBC-ENTMCNC: 33.1 GM/DL
MCV RBC AUTO: 87.6 FL
MICROSCOPIC-UA: NORMAL
MONOCYTES # BLD AUTO: 0.38 K/UL
MONOCYTES NFR BLD AUTO: 8.5 %
NEUTROPHILS # BLD AUTO: 1.51 K/UL
NEUTROPHILS NFR BLD AUTO: 34 %
NITRITE URINE: NEGATIVE
PH URINE: 6
PLATELET # BLD AUTO: 259 K/UL
POTASSIUM SERPL-SCNC: 3.8 MMOL/L
PROT SERPL-MCNC: 7.9 G/DL
PROTEIN URINE: NORMAL
RBC # BLD: 4.45 M/UL
RBC # FLD: 12.6 %
RED BLOOD CELLS URINE: 1 /HPF
SODIUM SERPL-SCNC: 143 MMOL/L
SPECIFIC GRAVITY URINE: 1.01
SQUAMOUS EPITHELIAL CELLS: 1 /HPF
TRIGL SERPL-MCNC: 226 MG/DL
UROBILINOGEN URINE: NORMAL
WBC # FLD AUTO: 4.45 K/UL
WHITE BLOOD CELLS URINE: 1 /HPF

## 2019-07-12 PROCEDURE — G0463: CPT

## 2019-07-12 NOTE — PHYSICAL EXAM
[General Appearance - Alert] : alert [General Appearance - In No Acute Distress] : in no acute distress [Sclera] : the sclera and conjunctiva were normal [PERRL With Normal Accommodation] : pupils were equal in size, round, reactive to light [Extraocular Movements] : extraocular movements were intact [Outer Ear] : the ears and nose were normal in appearance [Oropharynx] : the oropharynx was normal with no thrush [Neck Appearance] : the appearance of the neck was normal [Neck Cervical Mass (___cm)] : no neck mass was observed [Jugular Venous Distention Increased] : there was no jugular-venous distention [Thyroid Diffuse Enlargement] : the thyroid was not enlarged [Auscultation Breath Sounds / Voice Sounds] : lungs were clear to auscultation bilaterally [Heart Rate And Rhythm] : heart rate was normal and rhythm regular [Heart Sounds] : normal S1 and S2 [Heart Sounds Gallop] : no gallops [Murmurs] : no murmurs [Heart Sounds Pericardial Friction Rub] : no pericardial rub [Edema] : there was no peripheral edema [Full Pulse] : the pedal pulses are present [Abdomen Soft] : soft [Bowel Sounds] : normal bowel sounds [Abdomen Tenderness] : non-tender [Abdomen Mass (___ Cm)] : no abdominal mass palpated [Costovertebral Angle Tenderness] : no CVA tenderness [No Palpable Adenopathy] : no palpable adenopathy [Musculoskeletal - Swelling] : no joint swelling [Nail Clubbing] : no clubbing  or cyanosis of the fingernails [Motor Tone] : muscle strength and tone were normal [] : no rash [Skin Color & Pigmentation] : normal skin color and pigmentation [Oriented To Time, Place, And Person] : oriented to person, place, and time [Affect] : the affect was normal

## 2019-07-15 LAB
25(OH)D3 SERPL-MCNC: 36.1 NG/ML
C TRACH RRNA SPEC QL NAA+PROBE: NOT DETECTED
HIV1 RNA # SERPL NAA+PROBE: ABNORMAL
HIV1 RNA # SERPL NAA+PROBE: ABNORMAL COPIES/ML
M TB IFN-G BLD-IMP: NEGATIVE
N GONORRHOEA RRNA SPEC QL NAA+PROBE: NOT DETECTED
QUANTIFERON TB PLUS MITOGEN MINUS NIL: 5.01 IU/ML
QUANTIFERON TB PLUS NIL: 0.03 IU/ML
QUANTIFERON TB PLUS TB1 MINUS NIL: -0.01 IU/ML
QUANTIFERON TB PLUS TB2 MINUS NIL: -0.01 IU/ML
SOURCE AMPLIFICATION: NORMAL
T PALLIDUM AB SER QL IA: NEGATIVE
VIRAL LOAD INTERP: NORMAL
VIRAL LOAD LOG: ABNORMAL LG COP/ML

## 2019-08-12 ENCOUNTER — RX RENEWAL (OUTPATIENT)
Age: 49
End: 2019-08-12

## 2019-08-16 ENCOUNTER — RX RENEWAL (OUTPATIENT)
Age: 49
End: 2019-08-16

## 2019-09-12 ENCOUNTER — RX RENEWAL (OUTPATIENT)
Age: 49
End: 2019-09-12

## 2020-01-10 ENCOUNTER — RX RENEWAL (OUTPATIENT)
Age: 50
End: 2020-01-10

## 2020-01-10 NOTE — ASSESSMENT
[Treatment Adherence] : treatment adherence [Treatment Education] : treatment education [Drug Interactions / Side Effects] : drug interactions/side effects [HIV Education] : HIV Education [Anticipatory Guidance] : anticipatory guidance [FreeTextEntry1] : 7/12/2019----VENU SANDOVAL is a 48 year old male being seen for a follow-up visit. here for 6 month visit. States acid reflux, Pt states neuropathy on legs..making appt for neurology. Libido issues. Diagnosed with AIDS in 2008. Now Cd4 over 400. Undetrectable VL. . Doing well on Genvoya, Amlodipine increased to 10mg by cardiologist Dr. José Hebert on [facility] [phone]. Pt also had recent stress test. . Pt to continue the Lisinopril-Hydrochlorothiazide 20-12.5 MG Oral Tablet and Vit D3 as well. all labs today...see in 6 months.

## 2020-01-10 NOTE — HISTORY OF PRESENT ILLNESS
[FreeTextEntry1] :  \par Adendum---Needs to be on a statin. trigs way to elevated. \par History of Present Illness\par Reason For Visit\par 7/12/2019----VENU SANDOVAL is a 48 year old male being seen for a follow-up visit. here for 6 month visit. States acid reflux, Pt states neuropathy on legs..making appt for neurology. Libido issues. Diagnosed with AIDS in 2008. Now Cd4 over 400. Undetrectable VL. . Doing well on Genvoya, Amlodipine increased to 10mg by cardiologist Dr. José Hebert on [facility] [phone]. Pt also had recent stress test. . Pt to continue the Lisinopril-Hydrochlorothiazide 20-12.5 MG Oral Tablet and Vit D3 as well. all labs today...see in 6 months. \par \par Pt states familal CA history and Multiple myloma. \par \par Sexual History: The patient is sexually active. The patient has anal intercourse with men. He is currently sexually active with 0 male partner(s). 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

## 2020-01-13 ENCOUNTER — OUTPATIENT (OUTPATIENT)
Dept: OUTPATIENT SERVICES | Facility: HOSPITAL | Age: 50
LOS: 1 days | Discharge: HOME | End: 2020-01-13

## 2020-01-13 ENCOUNTER — TRANSCRIPTION ENCOUNTER (OUTPATIENT)
Age: 50
End: 2020-01-13

## 2020-01-13 ENCOUNTER — APPOINTMENT (OUTPATIENT)
Dept: OTOLARYNGOLOGY | Facility: CLINIC | Age: 50
End: 2020-01-13
Payer: MEDICAID

## 2020-01-13 VITALS
SYSTOLIC BLOOD PRESSURE: 130 MMHG | WEIGHT: 230 LBS | HEIGHT: 75 IN | DIASTOLIC BLOOD PRESSURE: 79 MMHG | BODY MASS INDEX: 28.6 KG/M2 | HEART RATE: 93 BPM

## 2020-01-13 PROCEDURE — 31575 DIAGNOSTIC LARYNGOSCOPY: CPT

## 2020-01-13 PROCEDURE — 99204 OFFICE O/P NEW MOD 45 MIN: CPT | Mod: 25

## 2020-01-13 NOTE — REASON FOR VISIT
[Initial Consultation] : an initial consultation for [FreeTextEntry2] : been refereed by his Dr Morley to follow up sleep Apia  and heavy breathing

## 2020-01-13 NOTE — PROCEDURE
[Complicated Symptoms] : complicated symptoms requiring more thorough examination than provided by mirror [Topical Lidocaine] : topical lidocaine [Flexible Endoscope] : examined with the flexible endoscope [Lateral Wall ___ %] : the lateral wall was [unfilled]U% collapsed [Normal] : normal [FreeTextEntry4] : mild post-cricoid edema [de-identified] : Left sided nasal septal deviation [FreeTextEntry9] : lateral collapse [de-identified] : large lingual tonsils obstructing view of the vallecula

## 2020-01-13 NOTE — HISTORY OF PRESENT ILLNESS
[de-identified] : 49M presenting for evaluation of sleep apnea. Reports first had a sleep study at least 20 years ago, at that time was diagnosed with sleep apnea, went to an ENT at that time who told patient he had a deviated septum and recommended a second opinion regarding need for surgery, did not follow-up or get a second opinion and was then started on CPAP which he used for about a month but could not tolerate so then stopped and never follow-up for his sleep apnea. Patient is now being referred by his ID doctor who is also his PCP for new evaluation. Is not sure about the results of his sleep study results. Reports his weight fluctuates and it's the heaviest he's been. Does report nightly snoring. Has been waking at night feeling like he needs to breath. Does not know about apneic episodes, does not report gasping for air. Does report worsening daytime fatigue over the past year. Reports walking up 2-3 times a night to urinate, which has been occurring over the past 2 years. Does not work at this time. Also reports post-nasal drip with at times coughing up of light green mucus with foul smell. Reports L>R nasal congestion. Denies facial pain, decreased sense of smell, h/o sinusitis. The post-nasal drip gets worse during allergy season. Went to see an allergist, was told is allergic to mold and dogs. Has used Flonase in the past which helped but stopped because does not like to take medication. Take Zyrtec prn. Also c/o acid reflux which is well controlled with ranitidine which he takes sometimes prn, also because does not like to take medication. Has noted some hoarseness which he attributes to the acid reflux as it improved on ranitidine in the past. Trying to follow a GERD prevention diet.

## 2020-01-13 NOTE — PHYSICAL EXAM
[Nasal Endoscopy Performed] : nasal endoscopy was performed, see procedure section for findings [] : septum deviated to the left [Midline] : trachea located in midline position [Laryngoscopy Performed] : laryngoscopy was performed, see procedure section for findings [de-identified] : palatal collapse [de-identified] : R 2+, L 1+, no mass/lesion [Normal] : palpation of lymph nodes is normal [de-identified] : no cyanosis

## 2020-01-13 NOTE — REVIEW OF SYSTEMS
[Sneezing] : sneezing [Seasonal Allergies] : seasonal allergies [Ear Itch] : ear itch [Recurrent Ear Infections] : recurrent ear infections [Problem Snoring] : problem snoring [Hoarseness] : hoarseness [Throat Clearing] : throat clearing [Wheezing] : wheezing [Negative] : Heme/Lymph [FreeTextEntry2] : fatigue

## 2020-01-17 ENCOUNTER — APPOINTMENT (OUTPATIENT)
Dept: INFECTIOUS DISEASE | Facility: CLINIC | Age: 50
End: 2020-01-17

## 2020-01-17 ENCOUNTER — OUTPATIENT (OUTPATIENT)
Dept: OUTPATIENT SERVICES | Facility: HOSPITAL | Age: 50
LOS: 1 days | End: 2020-01-17
Payer: COMMERCIAL

## 2020-01-17 VITALS
OXYGEN SATURATION: 98 % | HEART RATE: 80 BPM | RESPIRATION RATE: 14 BRPM | WEIGHT: 236 LBS | HEIGHT: 75 IN | BODY MASS INDEX: 29.34 KG/M2 | DIASTOLIC BLOOD PRESSURE: 82 MMHG | SYSTOLIC BLOOD PRESSURE: 137 MMHG | TEMPERATURE: 97.7 F

## 2020-01-17 DIAGNOSIS — B20 HUMAN IMMUNODEFICIENCY VIRUS [HIV] DISEASE: ICD-10-CM

## 2020-01-17 DIAGNOSIS — R41.3 OTHER AMNESIA: ICD-10-CM

## 2020-01-17 LAB
25(OH)D3 SERPL-MCNC: 53.6 NG/ML
ALBUMIN SERPL ELPH-MCNC: 4.8 G/DL
ALP BLD-CCNC: 57 U/L
ALT SERPL-CCNC: 33 U/L
ANION GAP SERPL CALC-SCNC: 14 MMOL/L
AST SERPL-CCNC: 28 U/L
BILIRUB SERPL-MCNC: 0.3 MG/DL
BUN SERPL-MCNC: 7 MG/DL
CALCIUM SERPL-MCNC: 9.9 MG/DL
CHLORIDE SERPL-SCNC: 100 MMOL/L
CO2 SERPL-SCNC: 29 MMOL/L
CREAT SERPL-MCNC: 1.38 MG/DL
GLUCOSE SERPL-MCNC: 98 MG/DL
POTASSIUM SERPL-SCNC: 3.9 MMOL/L
PROT SERPL-MCNC: 8 G/DL
SODIUM SERPL-SCNC: 143 MMOL/L
TSH SERPL-ACNC: 1.42 UIU/ML

## 2020-01-17 PROCEDURE — G0008: CPT

## 2020-01-17 PROCEDURE — G0463: CPT | Mod: 25

## 2020-01-17 PROCEDURE — 90686 IIV4 VACC NO PRSV 0.5 ML IM: CPT

## 2020-01-17 NOTE — HISTORY OF PRESENT ILLNESS
[FreeTextEntry1] : Adendum---Needs to be on a statin. trigs way to elevated. \par History of Present Illness\par Reason For Visit\par 1/17/2020----VENU SANDOVAL is a 49 year old male being seen for a follow-up visit. here for 6 month visit. States acid reflux, Pt states neuropathy on legs..making appt for neurology.  Diagnosed with AIDS in 2008. Now Cd4 over 400. Undetrectable VL. . Doing well on Genvoya, Amlodipine increased to 10mg by cardiologist Dr. José Hebert on [facility] [phone]. Pt also had recent stress test. . Pt to continue the Lisinopril-Hydrochlorothiazide 20-12.5 MG Oral Tablet and Vit D3 as well. all labs today...see in 6 months. No family Hx of Colon Ca. States poor short term memory please see Dr. Craig. \par \par Pt states familal CA history and Multiple myloma. \par \par Sexual History: The patient is sexually active. The patient has anal intercourse with men. He is currently sexually active with 0 male partner(s). 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

## 2020-01-17 NOTE — ASSESSMENT
[FreeTextEntry1] : 1/17/2020----VENU SANDOVAL is a 49 year old male being seen for a follow-up visit. here for 6 month visit. States acid reflux, Pt states neuropathy on legs..making appt for neurology.  Diagnosed with AIDS in 2008. Now Cd4 over 400. Undetrectable VL. . Doing well on Genvoya, Amlodipine increased to 10mg by cardiologist Dr. José Hebert on [facility] [phone]. Pt also had recent stress test. . Pt to continue the Lisinopril-Hydrochlorothiazide 20-12.5 MG Oral Tablet and Vit D3 as well. all labs today...see in 6 months. No family Hx of Colon Ca. States poor short term memory please see Dr. Craig. HIV stable [Treatment Education] : treatment education [Drug Interactions / Side Effects] : drug interactions/side effects [Treatment Adherence] : treatment adherence [HIV Education] : HIV Education [Anticipatory Guidance] : anticipatory guidance

## 2020-01-17 NOTE — PHYSICAL EXAM
[General Appearance - Alert] : alert [General Appearance - In No Acute Distress] : in no acute distress [Sclera] : the sclera and conjunctiva were normal [Extraocular Movements] : extraocular movements were intact [PERRL With Normal Accommodation] : pupils were equal in size, round, reactive to light [Oropharynx] : the oropharynx was normal with no thrush [Outer Ear] : the ears and nose were normal in appearance [Neck Appearance] : the appearance of the neck was normal [Neck Cervical Mass (___cm)] : no neck mass was observed [Jugular Venous Distention Increased] : there was no jugular-venous distention [Thyroid Diffuse Enlargement] : the thyroid was not enlarged [Auscultation Breath Sounds / Voice Sounds] : lungs were clear to auscultation bilaterally [Heart Rate And Rhythm] : heart rate was normal and rhythm regular [Heart Sounds] : normal S1 and S2 [Murmurs] : no murmurs [Heart Sounds Gallop] : no gallops [Heart Sounds Pericardial Friction Rub] : no pericardial rub [Full Pulse] : the pedal pulses are present [Edema] : there was no peripheral edema [Abdomen Soft] : soft [Bowel Sounds] : normal bowel sounds [Abdomen Tenderness] : non-tender [Abdomen Mass (___ Cm)] : no abdominal mass palpated [Costovertebral Angle Tenderness] : no CVA tenderness [No Palpable Adenopathy] : no palpable adenopathy [Nail Clubbing] : no clubbing  or cyanosis of the fingernails [Musculoskeletal - Swelling] : no joint swelling [Skin Color & Pigmentation] : normal skin color and pigmentation [Motor Tone] : muscle strength and tone were normal [Oriented To Time, Place, And Person] : oriented to person, place, and time [] : no rash [Affect] : the affect was normal

## 2020-01-21 DIAGNOSIS — Z23 ENCOUNTER FOR IMMUNIZATION: ICD-10-CM

## 2020-01-21 DIAGNOSIS — F41.3 OTHER MIXED ANXIETY DISORDERS: ICD-10-CM

## 2020-01-22 LAB
APPEARANCE: ABNORMAL
BACTERIA: NEGATIVE
BASOPHILS # BLD AUTO: 0.03 K/UL
BASOPHILS NFR BLD AUTO: 0.7 %
BILIRUBIN URINE: NEGATIVE
BLOOD URINE: NEGATIVE
C TRACH RRNA SPEC QL NAA+PROBE: NOT DETECTED
CD3 CELLS # BLD: 1448 /UL
CD3 CELLS NFR BLD: 68 %
CD3+CD4+ CELLS # BLD: 462 /UL
CD3+CD4+ CELLS NFR BLD: 22 %
CD3+CD4+ CELLS/CD3+CD8+ CLL SPEC: 0.5 RATIO
CD3+CD8+ CELLS # SPEC: 916 /UL
CD3+CD8+ CELLS NFR BLD: 43 %
COLOR: YELLOW
EOSINOPHIL # BLD AUTO: 0.07 K/UL
EOSINOPHIL NFR BLD AUTO: 1.6 %
ESTIMATED AVERAGE GLUCOSE: 111 MG/DL
GLUCOSE QUALITATIVE U: NEGATIVE
HBA1C MFR BLD HPLC: 5.5 %
HCT VFR BLD CALC: 39.9 %
HGB BLD-MCNC: 13.3 G/DL
HIV1 RNA # SERPL NAA+PROBE: ABNORMAL
HIV1 RNA # SERPL NAA+PROBE: ABNORMAL COPIES/ML
HYALINE CASTS: 4 /LPF
IMM GRANULOCYTES NFR BLD AUTO: 0.2 %
KETONES URINE: NEGATIVE
LEUKOCYTE ESTERASE URINE: NEGATIVE
LYMPHOCYTES # BLD AUTO: 2.6 K/UL
LYMPHOCYTES NFR BLD AUTO: 57.8 %
MAN DIFF?: NORMAL
MCHC RBC-ENTMCNC: 29.3 PG
MCHC RBC-ENTMCNC: 33.3 GM/DL
MCV RBC AUTO: 87.9 FL
MICROSCOPIC-UA: NORMAL
MONOCYTES # BLD AUTO: 0.38 K/UL
MONOCYTES NFR BLD AUTO: 8.4 %
N GONORRHOEA RRNA SPEC QL NAA+PROBE: NOT DETECTED
NEUTROPHILS # BLD AUTO: 1.41 K/UL
NEUTROPHILS NFR BLD AUTO: 31.3 %
NITRITE URINE: NEGATIVE
PH URINE: 7.5
PLATELET # BLD AUTO: 318 K/UL
PROTEIN URINE: NORMAL
PSA SERPL-MCNC: 1.15 NG/ML
RBC # BLD: 4.54 M/UL
RBC # FLD: 13.2 %
RED BLOOD CELLS URINE: 7 /HPF
SOURCE AMPLIFICATION: NORMAL
SOURCE ANAL: NORMAL
SOURCE ORAL: NORMAL
SPECIFIC GRAVITY URINE: 1.03
SQUAMOUS EPITHELIAL CELLS: 16 /HPF
T PALLIDUM AB SER QL IA: NEGATIVE
UROBILINOGEN URINE: NORMAL
VIRAL LOAD INTERP: NORMAL
VIRAL LOAD LOG: ABNORMAL LG COP/ML
WBC # FLD AUTO: 4.5 K/UL
WHITE BLOOD CELLS URINE: 18 /HPF

## 2020-01-23 ENCOUNTER — APPOINTMENT (OUTPATIENT)
Dept: INFECTIOUS DISEASE | Facility: CLINIC | Age: 50
End: 2020-01-23

## 2020-01-23 ENCOUNTER — OUTPATIENT (OUTPATIENT)
Dept: OUTPATIENT SERVICES | Facility: HOSPITAL | Age: 50
LOS: 1 days | End: 2020-01-23
Payer: COMMERCIAL

## 2020-01-23 DIAGNOSIS — F33.0 MAJOR DEPRESSIVE DISORDER, RECURRENT, MILD: ICD-10-CM

## 2020-01-23 PROCEDURE — 96132 NRPSYC TST EVAL PHYS/QHP 1ST: CPT

## 2020-01-23 PROCEDURE — 90791 PSYCH DIAGNOSTIC EVALUATION: CPT

## 2020-01-23 PROCEDURE — 96133 NRPSYC TST EVAL PHYS/QHP EA: CPT

## 2020-01-23 PROCEDURE — 96137 PSYCL/NRPSYC TST PHY/QHP EA: CPT

## 2020-01-23 PROCEDURE — 96136 PSYCL/NRPSYC TST PHY/QHP 1ST: CPT

## 2020-01-29 DIAGNOSIS — G47.30 SLEEP APNEA, UNSPECIFIED: ICD-10-CM

## 2020-01-29 DIAGNOSIS — B20 HUMAN IMMUNODEFICIENCY VIRUS [HIV] DISEASE: ICD-10-CM

## 2020-01-29 DIAGNOSIS — J34.2 DEVIATED NASAL SEPTUM: ICD-10-CM

## 2020-01-29 DIAGNOSIS — K21.9 GASTRO-ESOPHAGEAL REFLUX DISEASE WITHOUT ESOPHAGITIS: ICD-10-CM

## 2020-03-10 ENCOUNTER — RX RENEWAL (OUTPATIENT)
Age: 50
End: 2020-03-10

## 2020-04-13 ENCOUNTER — APPOINTMENT (OUTPATIENT)
Dept: PULMONOLOGY | Facility: CLINIC | Age: 50
End: 2020-04-13

## 2020-04-15 ENCOUNTER — APPOINTMENT (OUTPATIENT)
Dept: PULMONOLOGY | Facility: CLINIC | Age: 50
End: 2020-04-15

## 2020-04-27 DIAGNOSIS — B20 HUMAN IMMUNODEFICIENCY VIRUS [HIV] DISEASE: ICD-10-CM

## 2020-06-17 ENCOUNTER — APPOINTMENT (OUTPATIENT)
Dept: CARDIOLOGY | Facility: CLINIC | Age: 50
End: 2020-06-17
Payer: MEDICAID

## 2020-06-17 VITALS
HEART RATE: 95 BPM | OXYGEN SATURATION: 97 % | SYSTOLIC BLOOD PRESSURE: 135 MMHG | BODY MASS INDEX: 29.34 KG/M2 | DIASTOLIC BLOOD PRESSURE: 90 MMHG | WEIGHT: 236 LBS | HEIGHT: 75 IN

## 2020-06-17 DIAGNOSIS — R94.39 ABNORMAL RESULT OF OTHER CARDIOVASCULAR FUNCTION STUDY: ICD-10-CM

## 2020-06-17 PROCEDURE — 93000 ELECTROCARDIOGRAM COMPLETE: CPT

## 2020-06-17 PROCEDURE — 99205 OFFICE O/P NEW HI 60 MIN: CPT

## 2020-06-23 ENCOUNTER — APPOINTMENT (OUTPATIENT)
Dept: CV DIAGNOSTICS | Facility: HOSPITAL | Age: 50
End: 2020-06-23

## 2020-06-23 ENCOUNTER — OUTPATIENT (OUTPATIENT)
Dept: OUTPATIENT SERVICES | Facility: HOSPITAL | Age: 50
LOS: 1 days | End: 2020-06-23
Payer: COMMERCIAL

## 2020-06-23 DIAGNOSIS — R06.02 SHORTNESS OF BREATH: ICD-10-CM

## 2020-06-23 PROBLEM — R94.39 ABNORMAL STRESS TEST: Status: ACTIVE | Noted: 2020-06-23

## 2020-06-23 PROCEDURE — 93016 CV STRESS TEST SUPVJ ONLY: CPT

## 2020-06-23 PROCEDURE — 93018 CV STRESS TEST I&R ONLY: CPT

## 2020-06-23 PROCEDURE — 93017 CV STRESS TEST TRACING ONLY: CPT

## 2020-06-24 ENCOUNTER — APPOINTMENT (OUTPATIENT)
Dept: CARDIOLOGY | Facility: CLINIC | Age: 50
End: 2020-06-24
Payer: MEDICAID

## 2020-06-24 PROCEDURE — 99214 OFFICE O/P EST MOD 30 MIN: CPT | Mod: 95

## 2020-06-30 ENCOUNTER — APPOINTMENT (OUTPATIENT)
Dept: INFECTIOUS DISEASE | Facility: CLINIC | Age: 50
End: 2020-06-30

## 2020-06-30 LAB
BASOPHILS # BLD AUTO: 0.03 K/UL
BASOPHILS NFR BLD AUTO: 0.7 %
CD3 CELLS # BLD: 1483 /UL
CD3 CELLS NFR BLD: 70 %
CD3+CD4+ CELLS # BLD: 506 /UL
CD3+CD4+ CELLS NFR BLD: 24 %
CD3+CD4+ CELLS/CD3+CD8+ CLL SPEC: 0.55 RATIO
CD3+CD8+ CELLS # SPEC: 913 /UL
CD3+CD8+ CELLS NFR BLD: 43 %
EOSINOPHIL # BLD AUTO: 0.08 K/UL
EOSINOPHIL NFR BLD AUTO: 1.8 %
HCT VFR BLD CALC: 37.8 %
HGB BLD-MCNC: 12.3 G/DL
IMM GRANULOCYTES NFR BLD AUTO: 0.2 %
LYMPHOCYTES # BLD AUTO: 2.32 K/UL
LYMPHOCYTES NFR BLD AUTO: 51.2 %
MAN DIFF?: NORMAL
MCHC RBC-ENTMCNC: 28.7 PG
MCHC RBC-ENTMCNC: 32.5 GM/DL
MCV RBC AUTO: 88.3 FL
MONOCYTES # BLD AUTO: 0.41 K/UL
MONOCYTES NFR BLD AUTO: 9.1 %
NEUTROPHILS # BLD AUTO: 1.68 K/UL
NEUTROPHILS NFR BLD AUTO: 37 %
PLATELET # BLD AUTO: 253 K/UL
RBC # BLD: 4.28 M/UL
RBC # FLD: 13.3 %
WBC # FLD AUTO: 4.53 K/UL

## 2020-07-01 ENCOUNTER — RX RENEWAL (OUTPATIENT)
Age: 50
End: 2020-07-01

## 2020-07-01 LAB
25(OH)D3 SERPL-MCNC: 42.5 NG/ML
ALBUMIN SERPL ELPH-MCNC: 4.8 G/DL
ALP BLD-CCNC: 55 U/L
ALT SERPL-CCNC: 38 U/L
ANION GAP SERPL CALC-SCNC: 17 MMOL/L
APPEARANCE: CLEAR
AST SERPL-CCNC: 30 U/L
BACTERIA: NEGATIVE
BILIRUB SERPL-MCNC: 0.4 MG/DL
BILIRUBIN URINE: NEGATIVE
BLOOD URINE: NEGATIVE
BUN SERPL-MCNC: 13 MG/DL
CALCIUM SERPL-MCNC: 9.4 MG/DL
CHLORIDE SERPL-SCNC: 102 MMOL/L
CHOLEST SERPL-MCNC: 265 MG/DL
CHOLEST/HDLC SERPL: 6.6 RATIO
CO2 SERPL-SCNC: 24 MMOL/L
COLOR: YELLOW
CREAT SERPL-MCNC: 1.48 MG/DL
CYSTATIN C SERPL-MCNC: 0.99 MG/L
ESTIMATED AVERAGE GLUCOSE: 108 MG/DL
GFR/BSA.PRED SERPLBLD CYS-BASED-ARV: 82 ML/MIN
GLUCOSE QUALITATIVE U: NEGATIVE
GLUCOSE SERPL-MCNC: 101 MG/DL
HBA1C MFR BLD HPLC: 5.4 %
HDLC SERPL-MCNC: 40 MG/DL
HIV1 RNA # SERPL NAA+PROBE: NORMAL
HIV1 RNA # SERPL NAA+PROBE: NORMAL COPIES/ML
HYALINE CASTS: 3 /LPF
KETONES URINE: NEGATIVE
LDLC SERPL CALC-MCNC: 165 MG/DL
LEUKOCYTE ESTERASE URINE: NEGATIVE
MICROSCOPIC-UA: NORMAL
NITRITE URINE: NEGATIVE
PH URINE: 6
POTASSIUM SERPL-SCNC: 3.6 MMOL/L
PROT SERPL-MCNC: 7.4 G/DL
PROTEIN URINE: NORMAL
RED BLOOD CELLS URINE: 1 /HPF
SODIUM SERPL-SCNC: 143 MMOL/L
SPECIFIC GRAVITY URINE: 1.02
SQUAMOUS EPITHELIAL CELLS: 1 /HPF
T PALLIDUM AB SER QL IA: NEGATIVE
TRIGL SERPL-MCNC: 300 MG/DL
UROBILINOGEN URINE: NORMAL
VIRAL LOAD INTERP: NORMAL
VIRAL LOAD LOG: NORMAL LG COP/ML
WHITE BLOOD CELLS URINE: 1 /HPF

## 2020-07-02 LAB
C TRACH RRNA SPEC QL NAA+PROBE: NOT DETECTED
N GONORRHOEA RRNA SPEC QL NAA+PROBE: NOT DETECTED
SOURCE AMPLIFICATION: NORMAL

## 2020-07-13 ENCOUNTER — APPOINTMENT (OUTPATIENT)
Dept: UROLOGY | Facility: HOSPITAL | Age: 50
End: 2020-07-13

## 2020-07-13 ENCOUNTER — OUTPATIENT (OUTPATIENT)
Dept: OUTPATIENT SERVICES | Facility: HOSPITAL | Age: 50
LOS: 1 days | End: 2020-07-13
Payer: MEDICAID

## 2020-07-13 VITALS
SYSTOLIC BLOOD PRESSURE: 112 MMHG | HEIGHT: 75 IN | WEIGHT: 238 LBS | HEART RATE: 97 BPM | BODY MASS INDEX: 29.59 KG/M2 | DIASTOLIC BLOOD PRESSURE: 72 MMHG

## 2020-07-13 DIAGNOSIS — Z12.5 ENCOUNTER FOR SCREENING FOR MALIGNANT NEOPLASM OF PROSTATE: ICD-10-CM

## 2020-07-13 DIAGNOSIS — R30.0 DYSURIA: ICD-10-CM

## 2020-07-13 DIAGNOSIS — N40.0 BENIGN PROSTATIC HYPERPLASIA WITHOUT LOWER URINARY TRACT SYMPTOMS: ICD-10-CM

## 2020-07-13 LAB — PSA FLD-MCNC: 1.35 NG/ML — SIGNIFICANT CHANGE UP (ref 0–4)

## 2020-07-13 PROCEDURE — 84402 ASSAY OF FREE TESTOSTERONE: CPT

## 2020-07-13 PROCEDURE — G0103: CPT

## 2020-07-13 PROCEDURE — G0463: CPT

## 2020-07-13 PROCEDURE — 84403 ASSAY OF TOTAL TESTOSTERONE: CPT

## 2020-07-13 PROCEDURE — 51798 US URINE CAPACITY MEASURE: CPT

## 2020-07-13 NOTE — HISTORY OF PRESENT ILLNESS
[FreeTextEntry1] : Mr. Wei is a 49 yom with PMH significant for HIV who presents fro evaluation of hyopogonadism.  For the past two years, patient says he has noticed decreased body/facial hair, increased body fat, mild fatigue, and mildly decreased libido.  He says he is still able to get an erection and achieve orgasm.  \par \par He additionally endorses urinary symptoms.  He urinates 7-8 times/day and wakes up 2-3 times a night to void.  Patient is currently experiencing no nausea and no anorexia no urinary retention, no urinary urgency, no straining, no weak stream, no dysuria, no gross hematuria, no bladder spasm, no abdominal pain, no flank pain, no fever and no fatigue.  He drinks 2 cups of iced tea and one cup of green tea/day.  PVR measured as 0.\par

## 2020-07-13 NOTE — PHYSICAL EXAM
[General Appearance - Well Developed] : well developed [General Appearance - Well Nourished] : well nourished [Normal Appearance] : normal appearance [Well Groomed] : well groomed [Edema] : no peripheral edema [General Appearance - In No Acute Distress] : no acute distress [Respiration, Rhythm And Depth] : normal respiratory rhythm and effort [Exaggerated Use Of Accessory Muscles For Inspiration] : no accessory muscle use [Abdomen Soft] : soft [Abdomen Tenderness] : non-tender [Costovertebral Angle Tenderness] : no ~M costovertebral angle tenderness [Urinary Bladder Findings] : the bladder was normal on palpation [Urethral Meatus] : meatus normal [Scrotum] : the scrotum was normal [No Prostate Nodules] : no prostate nodules [Testes Mass (___cm)] : there were no testicular masses [Normal Station and Gait] : the gait and station were normal for the patient's age [] : no rash [No Focal Deficits] : no focal deficits [Oriented To Time, Place, And Person] : oriented to person, place, and time [Affect] : the affect was normal [Mood] : the mood was normal [Not Anxious] : not anxious [No Palpable Adenopathy] : no palpable adenopathy [FreeTextEntry1] : No varicocele

## 2020-07-13 NOTE — ASSESSMENT
[FreeTextEntry1] : Discussed w/ patient that his symptoms may be do to decreased testosterone, but they are relatively non-specific.  We will need to obtain a serum testosterone to further evaluate.  Also discussed starting flomax for his urinary symptoms, but he is not interested at this time.  Discussed risks and benefits of PSA testing and decided to proceed with a test.\par \par - Serum testosterone\par - Serum PSA\par - F/u in one month

## 2020-07-14 ENCOUNTER — APPOINTMENT (OUTPATIENT)
Dept: CARDIOLOGY | Facility: CLINIC | Age: 50
End: 2020-07-14
Payer: MEDICAID

## 2020-07-14 PROCEDURE — 93306 TTE W/DOPPLER COMPLETE: CPT

## 2020-07-16 ENCOUNTER — FORM ENCOUNTER (OUTPATIENT)
Age: 50
End: 2020-07-16

## 2020-07-17 ENCOUNTER — APPOINTMENT (OUTPATIENT)
Dept: INFECTIOUS DISEASE | Facility: CLINIC | Age: 50
End: 2020-07-17
Payer: MEDICAID

## 2020-07-17 ENCOUNTER — APPOINTMENT (OUTPATIENT)
Dept: DISASTER EMERGENCY | Facility: CLINIC | Age: 50
End: 2020-07-17

## 2020-07-17 VITALS
BODY MASS INDEX: 29.97 KG/M2 | HEART RATE: 92 BPM | TEMPERATURE: 97.7 F | WEIGHT: 241 LBS | DIASTOLIC BLOOD PRESSURE: 97 MMHG | SYSTOLIC BLOOD PRESSURE: 141 MMHG | HEIGHT: 75 IN | OXYGEN SATURATION: 99 %

## 2020-07-17 DIAGNOSIS — R21 RASH AND OTHER NONSPECIFIC SKIN ERUPTION: ICD-10-CM

## 2020-07-17 LAB
TESTOST FLD-SCNC: 223.4 NG/DL — LOW (ref 264–916)
TESTOSTERONE.FREE+WB SERPL-MCNC: 11 PG/ML — SIGNIFICANT CHANGE UP (ref 6.8–21.5)

## 2020-07-17 PROCEDURE — 99214 OFFICE O/P EST MOD 30 MIN: CPT

## 2020-07-17 NOTE — PHYSICAL EXAM
[General Appearance - Alert] : alert [General Appearance - In No Acute Distress] : in no acute distress [Sclera] : the sclera and conjunctiva were normal [PERRL With Normal Accommodation] : pupils were equal in size, round, reactive to light [Extraocular Movements] : extraocular movements were intact [Outer Ear] : the ears and nose were normal in appearance [Oropharynx] : the oropharynx was normal with no thrush [Neck Appearance] : the appearance of the neck was normal [Neck Cervical Mass (___cm)] : no neck mass was observed [Jugular Venous Distention Increased] : there was no jugular-venous distention [Thyroid Diffuse Enlargement] : the thyroid was not enlarged [Auscultation Breath Sounds / Voice Sounds] : lungs were clear to auscultation bilaterally [Heart Sounds] : normal S1 and S2 [Heart Rate And Rhythm] : heart rate was normal and rhythm regular [Murmurs] : no murmurs [Heart Sounds Gallop] : no gallops [Heart Sounds Pericardial Friction Rub] : no pericardial rub [Full Pulse] : the pedal pulses are present [Edema] : there was no peripheral edema [Bowel Sounds] : normal bowel sounds [Abdomen Soft] : soft [Abdomen Tenderness] : non-tender [Abdomen Mass (___ Cm)] : no abdominal mass palpated [Costovertebral Angle Tenderness] : no CVA tenderness [No Palpable Adenopathy] : no palpable adenopathy [Musculoskeletal - Swelling] : no joint swelling [Nail Clubbing] : no clubbing  or cyanosis of the fingernails [Motor Tone] : muscle strength and tone were normal [Skin Color & Pigmentation] : normal skin color and pigmentation [] : no rash [Oriented To Time, Place, And Person] : oriented to person, place, and time [Affect] : the affect was normal

## 2020-07-18 LAB — SARS-COV-2 N GENE NPH QL NAA+PROBE: NOT DETECTED

## 2020-07-20 ENCOUNTER — APPOINTMENT (OUTPATIENT)
Dept: DISASTER EMERGENCY | Facility: CLINIC | Age: 50
End: 2020-07-20

## 2020-07-20 ENCOUNTER — FORM ENCOUNTER (OUTPATIENT)
Age: 50
End: 2020-07-20

## 2020-07-20 DIAGNOSIS — R79.89 OTHER SPECIFIED ABNORMAL FINDINGS OF BLOOD CHEMISTRY: ICD-10-CM

## 2020-07-21 ENCOUNTER — APPOINTMENT (OUTPATIENT)
Dept: PULMONOLOGY | Facility: CLINIC | Age: 50
End: 2020-07-21
Payer: MEDICAID

## 2020-07-21 ENCOUNTER — OUTPATIENT (OUTPATIENT)
Dept: OUTPATIENT SERVICES | Facility: HOSPITAL | Age: 50
LOS: 1 days | End: 2020-07-21
Payer: MEDICAID

## 2020-07-21 VITALS
WEIGHT: 238 LBS | TEMPERATURE: 97.5 F | DIASTOLIC BLOOD PRESSURE: 80 MMHG | OXYGEN SATURATION: 98 % | HEART RATE: 103 BPM | HEIGHT: 75 IN | SYSTOLIC BLOOD PRESSURE: 120 MMHG | RESPIRATION RATE: 17 BRPM | BODY MASS INDEX: 29.59 KG/M2

## 2020-07-21 DIAGNOSIS — Z84.1 FAMILY HISTORY OF DISORDERS OF KIDNEY AND URETER: ICD-10-CM

## 2020-07-21 DIAGNOSIS — R94.39 ABNORMAL RESULT OF OTHER CARDIOVASCULAR FUNCTION STUDY: ICD-10-CM

## 2020-07-21 LAB
ANION GAP SERPL CALC-SCNC: 16 MMOL/L — SIGNIFICANT CHANGE UP (ref 5–17)
BUN SERPL-MCNC: 11 MG/DL — SIGNIFICANT CHANGE UP (ref 7–23)
CALCIUM SERPL-MCNC: 10 MG/DL — SIGNIFICANT CHANGE UP (ref 8.4–10.5)
CHLORIDE SERPL-SCNC: 103 MMOL/L — SIGNIFICANT CHANGE UP (ref 96–108)
CO2 SERPL-SCNC: 23 MMOL/L — SIGNIFICANT CHANGE UP (ref 22–31)
CREAT SERPL-MCNC: 1.7 MG/DL — HIGH (ref 0.5–1.3)
GLUCOSE SERPL-MCNC: 102 MG/DL — HIGH (ref 70–99)
HCT VFR BLD CALC: 38.4 % — LOW (ref 39–50)
HGB BLD-MCNC: 12.9 G/DL — LOW (ref 13–17)
MCHC RBC-ENTMCNC: 29 PG — SIGNIFICANT CHANGE UP (ref 27–34)
MCHC RBC-ENTMCNC: 33.6 GM/DL — SIGNIFICANT CHANGE UP (ref 32–36)
MCV RBC AUTO: 86.3 FL — SIGNIFICANT CHANGE UP (ref 80–100)
NRBC # BLD: 0 /100 WBCS — SIGNIFICANT CHANGE UP (ref 0–0)
PLATELET # BLD AUTO: 290 K/UL — SIGNIFICANT CHANGE UP (ref 150–400)
POTASSIUM SERPL-MCNC: 3.5 MMOL/L — SIGNIFICANT CHANGE UP (ref 3.5–5.3)
POTASSIUM SERPL-SCNC: 3.5 MMOL/L — SIGNIFICANT CHANGE UP (ref 3.5–5.3)
RBC # BLD: 4.45 M/UL — SIGNIFICANT CHANGE UP (ref 4.2–5.8)
RBC # FLD: 13.1 % — SIGNIFICANT CHANGE UP (ref 10.3–14.5)
SODIUM SERPL-SCNC: 142 MMOL/L — SIGNIFICANT CHANGE UP (ref 135–145)
WBC # BLD: 5.2 K/UL — SIGNIFICANT CHANGE UP (ref 3.8–10.5)
WBC # FLD AUTO: 5.2 K/UL — SIGNIFICANT CHANGE UP (ref 3.8–10.5)

## 2020-07-21 PROCEDURE — 93005 ELECTROCARDIOGRAM TRACING: CPT

## 2020-07-21 PROCEDURE — 71046 X-RAY EXAM CHEST 2 VIEWS: CPT

## 2020-07-21 PROCEDURE — 94010 BREATHING CAPACITY TEST: CPT

## 2020-07-21 PROCEDURE — 94729 DIFFUSING CAPACITY: CPT

## 2020-07-21 PROCEDURE — 93458 L HRT ARTERY/VENTRICLE ANGIO: CPT

## 2020-07-21 PROCEDURE — 85027 COMPLETE CBC AUTOMATED: CPT

## 2020-07-21 PROCEDURE — 99152 MOD SED SAME PHYS/QHP 5/>YRS: CPT | Mod: 59

## 2020-07-21 PROCEDURE — 94727 GAS DIL/WSHOT DETER LNG VOL: CPT

## 2020-07-21 PROCEDURE — 94618 PULMONARY STRESS TESTING: CPT

## 2020-07-21 PROCEDURE — 93010 ELECTROCARDIOGRAM REPORT: CPT

## 2020-07-21 PROCEDURE — 93458 L HRT ARTERY/VENTRICLE ANGIO: CPT | Mod: 26,59

## 2020-07-21 PROCEDURE — 80048 BASIC METABOLIC PNL TOTAL CA: CPT

## 2020-07-21 PROCEDURE — 99152 MOD SED SAME PHYS/QHP 5/>YRS: CPT

## 2020-07-21 PROCEDURE — 99204 OFFICE O/P NEW MOD 45 MIN: CPT | Mod: 25

## 2020-07-21 PROCEDURE — 99153 MOD SED SAME PHYS/QHP EA: CPT

## 2020-07-21 PROCEDURE — C1887: CPT

## 2020-07-21 PROCEDURE — C1894: CPT

## 2020-07-21 PROCEDURE — C1769: CPT

## 2020-07-21 RX ORDER — RANITIDINE 75 MG/1
75 TABLET ORAL
Qty: 30 | Refills: 1 | Status: DISCONTINUED | COMMUNITY
Start: 2019-04-11 | End: 2020-07-21

## 2020-07-21 NOTE — HISTORY OF PRESENT ILLNESS
[TextBox_4] : Mr. SANDOVAL is a 50 year old male presenting to the office today for initial pulmonary evaluation. His chief complaint is\par \par -Leo Chata recommended\par -he notes other people noticing his breathing for 10 or more years\par -he notes SOB on stairs\par -he notes deviated septum, seen an ENT 1/2020\par -he notes CLARI tested positive years ago\par -he notes did not tolerate CPAP well\par -he notes PNA in 2012\par -he notes bronchitis ?2011\par -he notes wheezing and cough during bronchitis\par -he notes intermittent palpitations\par -he notes weight is up\par -he notes heartburn and reflux are increased\par -he notes PND\par -he notes energy level 7/10\par -he notes could fall asleep while watching a boring TV show \par -he notes could fall asleep in a car \par \par \par \par -he denies any headaches, nausea, vomiting, fever, chills, sweats, chest pain, chest pressure, diarrhea, constipation, dysphagia, dizziness, leg swelling, leg pain, itchy eyes, itchy ears, sour taste in the mouth, myalgias or arthralgias.

## 2020-07-21 NOTE — ADDENDUM
[FreeTextEntry1] : Documented by Javier Tamez acting as a scribe for Dr. Michael Parrish on 07/21/2020.\par \par All medical record entries made by the Scribe were at my, Dr. Michael Parrish's, direction and personally dictated by me on 07/21/2020. I have reviewed the chart and agree that the record accurately reflects my personal performance of the history, physical exam, assessment and plan. I have also personally directed, reviewed, and agree with the discharge instructions.

## 2020-07-21 NOTE — ASSESSMENT
[FreeTextEntry1] : Mr. SANDOVAL is a 50 year old male with a history of HIV "AIDS", arthritis, HSV 2, GERD, Cardiac arrhythmia, HTN, low testosterone, neuropathy, CLARI, low vitamin D, allergies who comes into the office today for pulmonary evaluation\par \par The patient's shortness of breath is multifactorial due to:\par -pulmonary disease \par      -?RADS/ Asthma , allergies, GERD, CLARI\par -poor breathing mechanics \par -overweight/out of shape\par -?cardiac disease \par \par Problem 1: ?RADS/ Asthma\par -f/u full PFTs\par -Asthma is believed to be caused by inherited (genetic) and environmental factor, but its exact cause is unknown. Asthma may be triggered by allergens, lung infections, or irritants in the air. Asthma triggers are different for each person \par \par Problem 2: Allergies/ Sinus\par -Complete blood work to include: IgE level, eosinophil level, vitamin D level, food IgE level, and asthma profile \par -continue INS prn\par Environmental measures for allergies were encouraged including mattress and pillow cover, air purifier, and environmental controls. \par \par Problem 3: GERD\par -Add Pepcid 40 mg QHS \par -Rule of 2s: avoid eating too much, eating too fast, eating too late, eating too spicy, eating too lousy, eating two hours before bed.\par -Things to avoid including overeating, spicy foods, tight clothing, eating within three hours of bed, this list is not all inclusive. \par -For treatment of reflux, possible options discussed including diet control, H2 blockers, PPIs, as well as coating motility agents discussed as treatment options. Timing of meals and proximity of last meal to sleep were discussed. If symptoms persist, a formal gastrointestinal evaluation is needed.\par \par Problem 4:?CLARI (RF: Low testosterone, elevated Mallampati, Reflux, fatigue) \par -complete blood work for free and total testosterone and iron studies (pending)\par -complete home sleep studies\par Sleep apnea is associated with adverse clinical consequences which an affect most organ systems. Cardiovascular disease risk includes arrhythmias, atrial fibrillation, hypertension, coronary artery disease, and stroke. Metabolic disorders include diabetes type 2, non-alcoholic fatty liver disease. Mood disorder especially depression; and cognitive decline especially in the elderly. Associations with chronic reflux/Wolfe’s esophagus some but not all inclusive. \par -Reasons include arousal consistent with hypopnea; respiratory events most prominent in REM sleep or supine position; therefore sleep staging and body position are important for accurate diagnosis and estimation of AHI. \par \par Problem 5: Poor Mechanics of Breathing\par - Proper breathing techniques were reviewed with an emphasis of exhalation. Patient instructed to breath in for 1 second and out for four seconds. Patient was encouraged to not talk while walking. \par \par Problem 6: Overweight\par -Weight loss, exercise, and diet control were discussed and are highly encouraged. Treatment options were given such as, aqua therapy, and contacting a nutritionist. Recommended to use the elliptical, stationary bike, less use of treadmill. Mindful eating was explained to the patient Obesity is associated with worsening asthma, shortness of breath, and potential for cardiac disease, diabetes, and other underlying medical conditions. \par \par Problem 7: Cardiac\par -recommended to follow up with a cardiologist (Leo Brizuela)\par -recommended proper hydration\par \par Problem 8: Health Maintenance/COVID19 Precautions:\par - Clean your hands often. Wash your hands often with soap and water for at least 20 seconds, especially after blowing your nose, coughing, or sneezing, or having been in a public place.\par - If soap and water are not available, use a hand  that contains at least 60% alcohol.\par - To the extent possible, avoid touching high-touch surfaces in public places - elevator buttons, door handles, handrails, handshaking with people, etc. Use a tissue or your sleeve to cover your hand or finger if you must touch something.\par - Wash your hands after touching surfaces in public places.\par - Avoid touching your face, nose, eyes, etc.\par - Clean and disinfect your home to remove germs: practice routine cleaning of frequently touched surfaces (for example: tables, doorknobs, light switches, handles, desks, toilets, faucets, sinks & cell phones)\par - Avoid crowds, especially in poorly ventilated spaces. Your risk of exposure to respiratory viruses like COVID-19 may increase in crowded, closed-in settings with little air circulation if there are people in the crowd who are sick. All patients are recommended to practice social distancing and stay at least 6 feet away from others.\par - Avoid all non-essential travel including plane trips, and especially avoid embarking on cruise ships.\par -If COVID-19 is spreading in your community, take extra measures to put distance between yourself and other people to further reduce your risk of being exposed to this new virus.\par -Stay home as much as possible.\par - Consider ways of getting food brought to your house through family, social, or commercial networks\par -Be aware that the virus has been known to live in the air up to 3 hours post exposure, cardboard up to 24 hours post exposure, copper up to 4 hours post exposure, steel and plastic up to 2-3 days post exposure. Risk of transmission from these surfaces are affected by many variables.\par Immune Support Recommendations:\par -OTC Vitamin C 500mg BID \par -OTC Quercetin 250-500mg BID \par -OTC Zinc 75-100mg per day \par -OTC Melatonin 1 or 2 mg a night \par -OTC Vitamin D 1-4000mg per day \par -OTC Tonic Water 8oz per day\par Asthma and COVID19:\par You need to make sure your asthma is under control. This often requires the use of inhaled corticosteroids (and sometimes oral corticosteroids). Inhaled corticosteroids do not likely reduce your immune system’s ability to fight infections, but oral corticosteroids may. It is important to use the steps above to protect yourself to limit your exposure to any respiratory virus. \par \par Problem 9: health maintenance\par -s/p influenza vaccine\par -recommended strep pneumonia vaccines after age 65: Prevnar-13 vaccine, followed by Pneumo vaccine 23 one year following\par -recommended early intervention for URIs\par -recommended regular osteoporosis evaluations\par -recommended early dermatological evaluations\par -recommended after the age of 50 to the age of 70, colonoscopy every 5 years \par \par  Follow up in 6-8 weeks\par -he  is recommended to call with any changes, questions, or concerns.

## 2020-07-21 NOTE — PHYSICAL EXAM
[No Acute Distress] : no acute distress [Normal Oropharynx] : normal oropharynx [Normal Appearance] : normal appearance [No Neck Mass] : no neck mass [Normal Rate/Rhythm] : normal rate/rhythm [Normal S1, S2] : normal s1, s2 [No Murmurs] : no murmurs [No Abnormalities] : no abnormalities [No Resp Distress] : no resp distress [Clear to Auscultation Bilaterally] : clear to auscultation bilaterally [No Clubbing] : no clubbing [Normal Gait] : normal gait [Benign] : benign [No Edema] : no edema [No Cyanosis] : no cyanosis [FROM] : FROM [No Focal Deficits] : no focal deficits [Normal Color/ Pigmentation] : normal color/ pigmentation [Oriented x3] : oriented x3 [Normal Affect] : normal affect [III] : Mallampati Class: III [TextBox_68] : I:E ratio 1:3; clear

## 2020-08-05 ENCOUNTER — APPOINTMENT (OUTPATIENT)
Dept: CARDIOLOGY | Facility: CLINIC | Age: 50
End: 2020-08-05
Payer: MEDICAID

## 2020-08-05 ENCOUNTER — LABORATORY RESULT (OUTPATIENT)
Age: 50
End: 2020-08-05

## 2020-08-05 VITALS — OXYGEN SATURATION: 96 % | SYSTOLIC BLOOD PRESSURE: 144 MMHG | DIASTOLIC BLOOD PRESSURE: 92 MMHG | HEART RATE: 98 BPM

## 2020-08-05 DIAGNOSIS — Z87.891 PERSONAL HISTORY OF NICOTINE DEPENDENCE: ICD-10-CM

## 2020-08-05 LAB
25(OH)D3 SERPL-MCNC: 46.3 NG/ML
BASOPHILS # BLD AUTO: 0.02 K/UL
BASOPHILS NFR BLD AUTO: 0.5 %
EOSINOPHIL # BLD AUTO: 0.1 K/UL
EOSINOPHIL NFR BLD AUTO: 2.3 %
FERRITIN SERPL-MCNC: 114 NG/ML
HCT VFR BLD CALC: 38.6 %
HGB BLD-MCNC: 12.8 G/DL
IMM GRANULOCYTES NFR BLD AUTO: 0.2 %
IRON SATN MFR SERPL: 22 %
IRON SERPL-MCNC: 68 UG/DL
LYMPHOCYTES # BLD AUTO: 2.18 K/UL
LYMPHOCYTES NFR BLD AUTO: 50.3 %
MAN DIFF?: NORMAL
MCHC RBC-ENTMCNC: 28.9 PG
MCHC RBC-ENTMCNC: 33.2 GM/DL
MCV RBC AUTO: 87.1 FL
MONOCYTES # BLD AUTO: 0.33 K/UL
MONOCYTES NFR BLD AUTO: 7.6 %
NEUTROPHILS # BLD AUTO: 1.69 K/UL
NEUTROPHILS NFR BLD AUTO: 39.1 %
PLATELET # BLD AUTO: 255 K/UL
RBC # BLD: 4.43 M/UL
RBC # FLD: 12.8 %
TIBC SERPL-MCNC: 316 UG/DL
UIBC SERPL-MCNC: 248 UG/DL
WBC # FLD AUTO: 4.33 K/UL

## 2020-08-05 PROCEDURE — 99215 OFFICE O/P EST HI 40 MIN: CPT

## 2020-08-05 PROCEDURE — 93000 ELECTROCARDIOGRAM COMPLETE: CPT

## 2020-08-06 LAB — 24R-OH-CALCIDIOL SERPL-MCNC: 57.6 PG/ML

## 2020-08-08 LAB
A ALTERNATA IGE QN: <0.1 KUA/L
A FUMIGATUS IGE QN: <0.1 KUA/L
C ALBICANS IGE QN: <0.1 KUA/L
C HERBARUM IGE QN: <0.1 KUA/L
CAT DANDER IGE QN: <0.1 KUA/L
CLAM IGE QN: <0.1 KUA/L
CODFISH IGE QN: <0.1 KUA/L
COMMON RAGWEED IGE QN: <0.1 KUA/L
CORN IGE QN: <0.1 KUA/L
COW MILK IGE QN: <0.1 KUA/L
D FARINAE IGE QN: <0.1 KUA/L
D PTERONYSS IGE QN: <0.1 KUA/L
DEPRECATED A ALTERNATA IGE RAST QL: 0
DEPRECATED A FUMIGATUS IGE RAST QL: 0
DEPRECATED C ALBICANS IGE RAST QL: 0
DEPRECATED C HERBARUM IGE RAST QL: 0
DEPRECATED CAT DANDER IGE RAST QL: 0
DEPRECATED CLAM IGE RAST QL: 0
DEPRECATED CODFISH IGE RAST QL: 0
DEPRECATED COMMON RAGWEED IGE RAST QL: 0
DEPRECATED CORN IGE RAST QL: 0
DEPRECATED COW MILK IGE RAST QL: 0
DEPRECATED D FARINAE IGE RAST QL: 0
DEPRECATED D PTERONYSS IGE RAST QL: 0
DEPRECATED DOG DANDER IGE RAST QL: 0
DEPRECATED EGG WHITE IGE RAST QL: 0
DEPRECATED M RACEMOSUS IGE RAST QL: 0
DEPRECATED PEANUT IGE RAST QL: 0
DEPRECATED ROACH IGE RAST QL: 0
DEPRECATED SCALLOP IGE RAST QL: <0.1 KUA/L
DEPRECATED SESAME SEED IGE RAST QL: 0
DEPRECATED SHRIMP IGE RAST QL: 0
DEPRECATED SOYBEAN IGE RAST QL: 0
DEPRECATED TIMOTHY IGE RAST QL: 0
DEPRECATED WALNUT IGE RAST QL: 0
DEPRECATED WHEAT IGE RAST QL: 0
DEPRECATED WHITE OAK IGE RAST QL: 0
DOG DANDER IGE QN: <0.1 KUA/L
EGG WHITE IGE QN: <0.1 KUA/L
M RACEMOSUS IGE QN: <0.1 KUA/L
PEANUT IGE QN: <0.1 KUA/L
ROACH IGE QN: <0.1 KUA/L
SCALLOP IGE QN: 0
SCALLOP IGE QN: <0.1 KUA/L
SESAME SEED IGE QN: <0.1 KUA/L
SOYBEAN IGE QN: <0.1 KUA/L
TIMOTHY IGE QN: <0.1 KUA/L
TOTAL IGE SMQN RAST: 9 KU/L
WALNUT IGE QN: <0.1 KUA/L
WHEAT IGE QN: <0.1 KUA/L
WHITE OAK IGE QN: <0.1 KUA/L

## 2020-08-11 ENCOUNTER — TRANSCRIPTION ENCOUNTER (OUTPATIENT)
Age: 50
End: 2020-08-11

## 2020-08-12 ENCOUNTER — TRANSCRIPTION ENCOUNTER (OUTPATIENT)
Age: 50
End: 2020-08-12

## 2020-08-20 ENCOUNTER — OUTPATIENT (OUTPATIENT)
Dept: OUTPATIENT SERVICES | Facility: HOSPITAL | Age: 50
LOS: 1 days | End: 2020-08-20
Payer: MEDICAID

## 2020-08-20 DIAGNOSIS — N40.0 BENIGN PROSTATIC HYPERPLASIA WITHOUT LOWER URINARY TRACT SYMPTOMS: ICD-10-CM

## 2020-08-20 DIAGNOSIS — Z12.5 ENCOUNTER FOR SCREENING FOR MALIGNANT NEOPLASM OF PROSTATE: ICD-10-CM

## 2020-08-20 LAB
ESTRADIOL FREE SERPL-MCNC: 15 PG/ML — SIGNIFICANT CHANGE UP (ref 11–43)
FSH SERPL-MCNC: 6.1 IU/L — SIGNIFICANT CHANGE UP (ref 1.5–12.4)
LH SERPL-ACNC: 4.3 IU/L — SIGNIFICANT CHANGE UP

## 2020-08-20 PROCEDURE — 82670 ASSAY OF TOTAL ESTRADIOL: CPT

## 2020-08-20 PROCEDURE — 83001 ASSAY OF GONADOTROPIN (FSH): CPT

## 2020-08-20 PROCEDURE — 84402 ASSAY OF FREE TESTOSTERONE: CPT

## 2020-08-20 PROCEDURE — 84403 ASSAY OF TOTAL TESTOSTERONE: CPT

## 2020-08-20 PROCEDURE — 83002 ASSAY OF GONADOTROPIN (LH): CPT

## 2020-08-24 ENCOUNTER — APPOINTMENT (OUTPATIENT)
Dept: UROLOGY | Facility: HOSPITAL | Age: 50
End: 2020-08-24

## 2020-08-24 ENCOUNTER — OUTPATIENT (OUTPATIENT)
Dept: OUTPATIENT SERVICES | Facility: HOSPITAL | Age: 50
LOS: 1 days | End: 2020-08-24
Payer: MEDICAID

## 2020-08-24 VITALS
DIASTOLIC BLOOD PRESSURE: 87 MMHG | TEMPERATURE: 95.5 F | HEIGHT: 75 IN | WEIGHT: 238 LBS | HEART RATE: 97 BPM | SYSTOLIC BLOOD PRESSURE: 123 MMHG | RESPIRATION RATE: 18 BRPM | BODY MASS INDEX: 29.59 KG/M2

## 2020-08-24 DIAGNOSIS — R30.0 DYSURIA: ICD-10-CM

## 2020-08-24 DIAGNOSIS — N40.1 BENIGN PROSTATIC HYPERPLASIA WITH LOWER URINARY TRACT SYMPTOMS: ICD-10-CM

## 2020-08-24 DIAGNOSIS — E29.1 TESTICULAR HYPOFUNCTION: ICD-10-CM

## 2020-08-24 PROCEDURE — G0463: CPT

## 2020-08-24 NOTE — ASSESSMENT
[FreeTextEntry1] : Discussed risks and benefits of testosterone replacmenet therapy.  Given no other abnormalities of HPA axis (normal FSH and LH), normal free testosterone, likely primary hypogonadism.  Risks of Testosterone therapy including increased clotting and MI discussed, and patient does not think his symptoms are worrisome enough to warrant initiaiton of therapy at this time.\par \par In regards to his urinary symptoms.  Recent negative UA, enlarged prostate on exam, recent PSA of 1.35.  Likely LUTs 2/2 BPH.  Discussed starting flomax, but again patient does not feel his symptoms are bothersome enough to start medical therapy.  \par \par - f/u in one year for repeat PSA and testosterone\par - Advised pt to call into clinic if he develops worsening symptoms

## 2020-08-24 NOTE — PHYSICAL EXAM
[General Appearance - Well Nourished] : well nourished [General Appearance - Well Developed] : well developed [Normal Appearance] : normal appearance [Well Groomed] : well groomed [General Appearance - In No Acute Distress] : no acute distress [Abdomen Soft] : soft [Abdomen Tenderness] : non-tender [Costovertebral Angle Tenderness] : no ~M costovertebral angle tenderness [Urethral Meatus] : meatus normal [Urinary Bladder Findings] : the bladder was normal on palpation [Testes Mass (___cm)] : there were no testicular masses [Scrotum] : the scrotum was normal [No Prostate Nodules] : no prostate nodules [] : no respiratory distress [Edema] : no peripheral edema [Exaggerated Use Of Accessory Muscles For Inspiration] : no accessory muscle use [Respiration, Rhythm And Depth] : normal respiratory rhythm and effort [Oriented To Time, Place, And Person] : oriented to person, place, and time [Affect] : the affect was normal [Mood] : the mood was normal [Not Anxious] : not anxious [No Focal Deficits] : no focal deficits [Normal Station and Gait] : the gait and station were normal for the patient's age [No Palpable Adenopathy] : no palpable adenopathy [FreeTextEntry1] : Prostate enlarged ~50cc

## 2020-08-24 NOTE — HISTORY OF PRESENT ILLNESS
[FreeTextEntry1] : Mr. Wei is a 50 yom with PMH significant for HIV who presents for follow-up for hyopogonadism.  Since his last visit, he has obtained testosterone, PSA, LH and FSH. PSA was 1.35.  Free testosterone was normal at  11. Total testosterone was low at 223.4.  FSH normal at 4.3.  LH normal at 6.1. Estradiol normal at 15.  Symptoms have not worsened significantly since last visit.  Still some decreased energy and libido, but nothing excessively bothersome.\par \par He additionally endorses urinary symptoms.  He urinates 7-8 times/day and wakes up 2-3 times a night to void.  Occasionally experiences urgency, especially when parking his car.  Recent UA in Gabe negative.  Patient is currently experiencing no nausea and no anorexia no urinary retention,, no straining, no weak stream, no dysuria, no gross hematuria, no bladder spasm, no abdominal pain, no flank pain, no fever and no fatigue.  He drinks 2 cups of iced tea and one cup of green tea/day.\par

## 2020-08-26 LAB
TESTOST FLD-SCNC: 225.9 NG/DL — LOW (ref 264–916)
TESTOSTERONE.FREE+WB SERPL-MCNC: 6.8 PG/ML — LOW (ref 7.2–24)

## 2020-08-26 NOTE — ASSESSMENT
[Treatment Education] : treatment education [Treatment Adherence] : treatment adherence [Drug Interactions / Side Effects] : drug interactions/side effects [HIV Education] : HIV Education [Anticipatory Guidance] : anticipatory guidance [FreeTextEntry1] : plan: \par needs eye referral, dental , and derm. continue Genvoya

## 2020-08-26 NOTE — HISTORY OF PRESENT ILLNESS
[FreeTextEntry1] : 7/17/2020----VENU SANDOVAL is a 49 year old male being seen for a follow-up visit. Here for 6 month visit. States acid reflux, . Diagnosed with AIDS in 2008. Now Cd4 over 500. Undetrectable VL. . Doing well on Genvoya, Amlodipine increased to 10mg by cardiologist Dr. José Hebert on [facility] [phone]. Pt also had recent stress test. . Pt to continue the Lisinopril-Hydrochlorothiazide 20-12.5 MG Oral Tablet and Vit D3 as well. all labs today...see in 6 months. No family Hx of Colon Ca. States poor short term memory and seen  please by Dr. Craig. \par Adendum on 8/26/2020----- received an rx for rosuvastatin 10 mg from Dr. Jordy Peraza as well if you wanted to update his med list.\par needs eye referral, dental , and derm. continue Genvoya\par \par Pt states familal history of Multiple myloma. \par \par Sexual History: The patient is sexually active. The patient has anal intercourse with men. He is currently sexually active with 0 male partner(s). \par  \par Active Problems\par AIDS (042) (B20)\par Anemia (285.9) (D64.9)\par Anxiety (300.00) (F41.9)\par Cardiac arrhythmia (427.9) (I49.9)\par 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)\par Hypertension (401.9) (I10)\par Scoliosis (737.30) (M41.9)\par Sickle cell trait (282.5) (D57.3)\par Sleep apnea (780.57) (G47.30)\par Vitamin d deficiency (268.9) (E55.9)\par \par Current Meds\par AmLODIPine Besylate 10 MG Oral Tablet\par Genvoya [phone] MG Oral Tablet; TAKE 1 TABLET BY MOUTH DAILY\par Lisinopril-Hydrochlorothiazide 20-12.5 MG Oral Tablet\par Vitamin D (Ergocalciferol) 49195 UNIT Oral Capsule; take 1 capsule by mouth every\par week\par \par Allergies\par No Known Drug Allergies\par \par  \par Active Problems\par Unreviewed Unverified: Cellulitis of mucous membrane of nose (478.19) (J34.89)\par Unreviewed Unverified: Heavy breathing (786.09) (R06.89)\par Unreviewed Unverified: Snoring (786.09) (R06.83)\par Acid reflux (530.81) (K21.9)\par AIDS (042) (B20)\par Anemia (285.9) (D64.9)\par Anxiety (300.00) (F41.9)\par Cardiac arrhythmia (427.9) (I49.9)\par Creatinine elevation (790.99) (R79.89)\par Decreased libido (799.81) (R68.82)\par Degenerative joint disease of both hips (715.95) (M16.0)\par Deviated septum (470) (J34.2)\par 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)\par Hypertension (401.9) (I10)\par Insomnia (780.52) (G47.00)\par Need for Menactra vaccination (V03.89) (Z23)\par Neuropathy involving both lower extremities (356.9) (G57.93)\par Osteoarthritis of both hips (715.95) (M16.0)\par Rash (782.1) (R21)\par Scoliosis (737.30) (M41.9)\par Sickle cell trait (282.5) (D57.3)\par Sleep apnea (780.57) (G47.30)\par Vitamin D deficiency (268.9) (E55.9)\par \par Past Medical History\par History of blood transfusion (V15.89) (Z92.89)\par History of candidiasis of mouth (V12.09) (Z86.19)\par History of complete eye exam (V15.89) (Z92.89)\par History of dental examination (V15.89) (Z92.89)\par History of gonorrhea (V12.09) (Z86.19)\par History of hemorrhoids (V13.89) (Z87.19)\par History of pneumocystis pneumonia (V12.61) (Z87.01)\par History of Hypophosphatemia (275.3) (E83.39)\par History of Perirectal ulcer (569.41) (K62.6)\par History of Walking pneumonia (486) (J18.9)\par \par Current Meds\par amLODIPine Besylate 5 MG Oral Tablet; TAKE 1 TABLET DAILY\par Diclofenac Sodium 75 MG Oral Tablet Delayed Release; Take 1 tablet twice daily\par Genvoya [phone] MG Oral Tablet; TAKE 1 TABLET BY MOUTH DAILY\par Lisinopril-hydroCHLOROthiazide 20-12.5 MG Oral Tablet; TAKE ONE TABLET BY MOUTH\par DAILY\par raNITIdine HCl - 75 MG Oral Tablet; TAKE 1 TABLET AT BEDTIME\par valACYclovir HCl - 1 GM Oral Tablet; TAKE 1 TABLET PO DAILY for prevention. if outbreak\par take 1 tab PO BID until lesions resolve\par Vitamin D (Ergocalciferol) 1.25 MG (02685 UT) Oral Capsule; TAKE 1 CAPSULE BY\par MOUTH EVERY 2 WEEKS\par Zantac 75 75 MG Oral Tablet; TAKE 1 TABLET AT BEDTIME\par \par Allergies\par No Known Drug Allergies\par \par Review of Systems\par \par Constitutional, Eyes, ENT, Cardiovascular, Respiratory, Gastrointestinal, Genitourinary, Musculoskeletal, Integumentary, Neurological, Psychiatric and Heme/Lymph are otherwise negative. \par

## 2020-09-07 ENCOUNTER — FORM ENCOUNTER (OUTPATIENT)
Age: 50
End: 2020-09-07

## 2020-09-08 ENCOUNTER — OUTPATIENT (OUTPATIENT)
Dept: OUTPATIENT SERVICES | Facility: HOSPITAL | Age: 50
LOS: 1 days | End: 2020-09-08
Payer: MEDICAID

## 2020-09-08 ENCOUNTER — APPOINTMENT (OUTPATIENT)
Dept: SLEEP CENTER | Facility: CLINIC | Age: 50
End: 2020-09-08
Payer: MEDICAID

## 2020-09-08 PROCEDURE — 95806 SLEEP STUDY UNATT&RESP EFFT: CPT

## 2020-09-08 PROCEDURE — 95806 SLEEP STUDY UNATT&RESP EFFT: CPT | Mod: 26

## 2020-09-14 ENCOUNTER — APPOINTMENT (OUTPATIENT)
Dept: PULMONOLOGY | Facility: CLINIC | Age: 50
End: 2020-09-14
Payer: MEDICAID

## 2020-09-14 DIAGNOSIS — Z71.89 OTHER SPECIFIED COUNSELING: ICD-10-CM

## 2020-09-14 DIAGNOSIS — R06.02 SHORTNESS OF BREATH: ICD-10-CM

## 2020-09-14 DIAGNOSIS — F41.9 ANXIETY DISORDER, UNSPECIFIED: ICD-10-CM

## 2020-09-14 PROCEDURE — 99214 OFFICE O/P EST MOD 30 MIN: CPT | Mod: 95

## 2020-09-14 RX ORDER — OLOPATADINE HYDROCHLORIDE 665 UG/1
0.6 SPRAY, METERED NASAL
Qty: 3 | Refills: 1 | Status: DISCONTINUED | COMMUNITY
Start: 2020-09-14 | End: 2020-09-14

## 2020-09-14 NOTE — ADDENDUM
[FreeTextEntry1] : Documented by Javier Tamez acting as a scribe for Dr. Michael Parrish on 09/14/2020.\par \par All medical record entries made by the Scribe were at my, Dr. Michael Parrish's, direction and personally dictated by me on 09/14/2020 . I have reviewed the chart and agree that the record accurately reflects my personal performance of the history, physical exam, assessment and plan. I have also personally directed, reviewed, and agree with the discharge instructions. \par

## 2020-09-14 NOTE — ASSESSMENT
[FreeTextEntry1] : Mr. SANDOVAL is a 50 year old male with a history of HIV "AIDS", arthritis, HSV 2, GERD, Cardiac arrhythmia, HTN, low testosterone, neuropathy, CLARI, low vitamin D, allergies who video calls into the office today for pulmonary evaluation- c/w Severe OSAS\par \par The patient's shortness of breath is multifactorial due to:\par -pulmonary disease \par      -?RADS/ Asthma , allergies, GERD, CLARI\par -poor breathing mechanics \par -overweight/out of shape\par -?cardiac disease \par \par Problem 1: RADS/ Asthma\par -s/p full PFTs c/w restrictive disease\par -Asthma is believed to be caused by inherited (genetic) and environmental factor, but its exact cause is unknown. Asthma may be triggered by allergens, lung infections, or irritants in the air. Asthma triggers are different for each person \par \par Problem 2: Allergies/ Sinus\par -s/p blood work to include: IgE level, eosinophil level, vitamin D level, food IgE level, and asthma profile (+)\par -continue INS prn\par -Add Olopatadine 0.6% at 1 sniff/nostril BID\par Environmental measures for allergies were encouraged including mattress and pillow cover, air purifier, and environmental controls. \par \par Problem 3: GERD\par -continue Pepcid 40 mg QHS \par -Rule of 2s: avoid eating too much, eating too fast, eating too late, eating too spicy, eating too lousy, eating two hours before bed.\par -Things to avoid including overeating, spicy foods, tight clothing, eating within three hours of bed, this list is not all inclusive. \par -For treatment of reflux, possible options discussed including diet control, H2 blockers, PPIs, as well as coating motility agents discussed as treatment options. Timing of meals and proximity of last meal to sleep were discussed. If symptoms persist, a formal gastrointestinal evaluation is needed.\par \par Problem 4:Severe CLARI (RF: Low testosterone, elevated Mallampati, Reflux, fatigue) \par -s/p CPAP/ Oral Appliance (noncompliant, not tolerated well)\par -Recommended Inspire Surgery\par -Recommended APAP (empire)\par -s/p blood work for free and total testosterone and iron studies (WNL)\par -s/p home sleep studies\par Sleep apnea is associated with adverse clinical consequences which an affect most organ systems. Cardiovascular disease risk includes arrhythmias, atrial fibrillation, hypertension, coronary artery disease, and stroke. Metabolic disorders include diabetes type 2, non-alcoholic fatty liver disease. Mood disorder especially depression; and cognitive decline especially in the elderly. Associations with chronic reflux/Wolfe’s esophagus some but not all inclusive. \par -Reasons include arousal consistent with hypopnea; respiratory events most prominent in REM sleep or supine position; therefore sleep staging and body position are important for accurate diagnosis and estimation of AHI. \par \par Problem 5: Poor Mechanics of Breathing\par - Proper breathing techniques were reviewed with an emphasis of exhalation. Patient instructed to breath in for 1 second and out for four seconds. Patient was encouraged to not talk while walking. \par \par Problem 6: Overweight\par -Weight loss, exercise, and diet control were discussed and are highly encouraged. Treatment options were given such as, aqua therapy, and contacting a nutritionist. Recommended to use the elliptical, stationary bike, less use of treadmill. Mindful eating was explained to the patient Obesity is associated with worsening asthma, shortness of breath, and potential for cardiac disease, diabetes, and other underlying medical conditions. \par \par Problem 7: Cardiac\par -recommended to follow up with a cardiologist (Leo Brizuela)\par -recommended proper hydration\par \par Problem 8: Health Maintenance/COVID19 Precautions:\par - Clean your hands often. Wash your hands often with soap and water for at least 20 seconds, especially after blowing your nose, coughing, or sneezing, or having been in a public place.\par - If soap and water are not available, use a hand  that contains at least 60% alcohol.\par - To the extent possible, avoid touching high-touch surfaces in public places - elevator buttons, door handles, handrails, handshaking with people, etc. Use a tissue or your sleeve to cover your hand or finger if you must touch something.\par - Wash your hands after touching surfaces in public places.\par - Avoid touching your face, nose, eyes, etc.\par - Clean and disinfect your home to remove germs: practice routine cleaning of frequently touched surfaces (for example: tables, doorknobs, light switches, handles, desks, toilets, faucets, sinks & cell phones)\par - Avoid crowds, especially in poorly ventilated spaces. Your risk of exposure to respiratory viruses like COVID-19 may increase in crowded, closed-in settings with little air circulation if there are people in the crowd who are sick. All patients are recommended to practice social distancing and stay at least 6 feet away from others.\par - Avoid all non-essential travel including plane trips, and especially avoid embarking on cruise ships.\par -If COVID-19 is spreading in your community, take extra measures to put distance between yourself and other people to further reduce your risk of being exposed to this new virus.\par -Stay home as much as possible.\par - Consider ways of getting food brought to your house through family, social, or commercial networks\par -Be aware that the virus has been known to live in the air up to 3 hours post exposure, cardboard up to 24 hours post exposure, copper up to 4 hours post exposure, steel and plastic up to 2-3 days post exposure. Risk of transmission from these surfaces are affected by many variables.\par Immune Support Recommendations:\par -OTC Vitamin C 500mg BID \par -OTC Quercetin 250-500mg BID \par -OTC Zinc 75-100mg per day \par -OTC Melatonin 1 or 2 mg a night \par -OTC Vitamin D 1-4000mg per day \par -OTC Tonic Water 8oz per day\par Asthma and COVID19:\par You need to make sure your asthma is under control. This often requires the use of inhaled corticosteroids (and sometimes oral corticosteroids). Inhaled corticosteroids do not likely reduce your immune system’s ability to fight infections, but oral corticosteroids may. It is important to use the steps above to protect yourself to limit your exposure to any respiratory virus. \par \par Problem 9: health maintenance\par -s/p influenza vaccine 2019\par -recommended strep pneumonia vaccines after age 65: Prevnar-13 vaccine, followed by Pneumo vaccine 23 one year following\par -recommended early intervention for URIs\par -recommended regular osteoporosis evaluations\par -recommended early dermatological evaluations\par -recommended after the age of 50 to the age of 70, colonoscopy every 5 years \par \par  Follow up in 6-8 weeks\par -he  is recommended to call with any changes, questions, or concerns.

## 2020-09-14 NOTE — PROCEDURE
[FreeTextEntry1] : \par Sleep study (9.10.2020) revealed severe obstructive sleep apnea associated with severe oxygen desaturation with an AHI/APOLINAR of 72.0, the [place] nena oxygen saturation was 58% with 42.5% of study time below 90%.\par \par \par Full PFT (7.21.20) revealed normal flows, with a FEV1 of 3.46 L, which is 73% of predicted, normal lung volumes, and a diffusion of 30.3, which is96% of predicted, with a normal flow volume loop.

## 2020-09-14 NOTE — HISTORY OF PRESENT ILLNESS
[Medical Office: ([facility])___] : at the medical office located in  [Home] : at home, [unfilled] , at the time of the visit. [Verbal consent obtained from patient] : the patient, [unfilled] [TextBox_4] : Mr. SANDOVAL is a 50 year old male video calling to the office today for pulmonary follow up. His chief complaint is\par \par -he notes generally feeling well\par -he notes exercising walking regularly\par -he notes regular bowel movements\par -he notes energy levels 7/10\par -he denies cough\par -he denies wheeze\par -he notes arthritis in lower back and hip limit physical activity\par -he notes treated CLARI with CPAP and dental device in past, but did not tolerate either well and no longer compliant\par -he notes nasal congested treated with Mucinex DM\par \par -denies any chest pain, chest pressure, diarrhea, constipation, dysphagia, dizziness, leg swelling, leg pain, itchy eyes, itchy ears, heartburn, reflux, or sour taste in the mouth.\par \par

## 2020-09-17 DIAGNOSIS — G47.33 OBSTRUCTIVE SLEEP APNEA (ADULT) (PEDIATRIC): ICD-10-CM

## 2020-10-05 ENCOUNTER — RX RENEWAL (OUTPATIENT)
Age: 50
End: 2020-10-05

## 2020-10-12 ENCOUNTER — OUTPATIENT (OUTPATIENT)
Dept: OUTPATIENT SERVICES | Facility: HOSPITAL | Age: 50
LOS: 1 days | Discharge: HOME | End: 2020-10-12

## 2020-10-12 ENCOUNTER — APPOINTMENT (OUTPATIENT)
Dept: OTOLARYNGOLOGY | Facility: CLINIC | Age: 50
End: 2020-10-12
Payer: MEDICAID

## 2020-10-12 VITALS
HEART RATE: 94 BPM | DIASTOLIC BLOOD PRESSURE: 99 MMHG | BODY MASS INDEX: 29.59 KG/M2 | SYSTOLIC BLOOD PRESSURE: 143 MMHG | WEIGHT: 238 LBS | HEIGHT: 75 IN

## 2020-10-12 DIAGNOSIS — G47.30 SLEEP APNEA, UNSPECIFIED: ICD-10-CM

## 2020-10-12 DIAGNOSIS — J34.2 DEVIATED NASAL SEPTUM: ICD-10-CM

## 2020-10-12 PROCEDURE — 99213 OFFICE O/P EST LOW 20 MIN: CPT

## 2020-10-12 NOTE — PHYSICAL EXAM
[Normal] :  tongue is normal [de-identified] : 1-2 + [de-identified] : collapsed crowded nasopharynx

## 2020-10-12 NOTE — REASON FOR VISIT
[Subsequent Evaluation] : a subsequent evaluation for [Other: _____] : [unfilled] [FreeTextEntry2] : follow up for sleep apnea, s/p sleep study 9/08/2020, AHI 72

## 2020-10-19 ENCOUNTER — NON-APPOINTMENT (OUTPATIENT)
Age: 50
End: 2020-10-19

## 2020-10-19 ENCOUNTER — APPOINTMENT (OUTPATIENT)
Dept: OPHTHALMOLOGY | Facility: CLINIC | Age: 50
End: 2020-10-19
Payer: MEDICAID

## 2020-10-19 PROCEDURE — 92004 COMPRE OPH EXAM NEW PT 1/>: CPT

## 2020-10-19 PROCEDURE — 99072 ADDL SUPL MATRL&STAF TM PHE: CPT

## 2020-10-28 ENCOUNTER — NON-APPOINTMENT (OUTPATIENT)
Age: 50
End: 2020-10-28

## 2020-10-28 ENCOUNTER — APPOINTMENT (OUTPATIENT)
Dept: CARDIOLOGY | Facility: CLINIC | Age: 50
End: 2020-10-28
Payer: MEDICAID

## 2020-10-28 VITALS — SYSTOLIC BLOOD PRESSURE: 137 MMHG | DIASTOLIC BLOOD PRESSURE: 87 MMHG | HEART RATE: 94 BPM | OXYGEN SATURATION: 97 %

## 2020-10-28 DIAGNOSIS — I49.9 CARDIAC ARRHYTHMIA, UNSPECIFIED: ICD-10-CM

## 2020-10-28 PROCEDURE — 93000 ELECTROCARDIOGRAM COMPLETE: CPT

## 2020-10-28 PROCEDURE — 99072 ADDL SUPL MATRL&STAF TM PHE: CPT

## 2020-10-28 PROCEDURE — 99215 OFFICE O/P EST HI 40 MIN: CPT

## 2020-10-30 ENCOUNTER — RX RENEWAL (OUTPATIENT)
Age: 50
End: 2020-10-30

## 2020-11-02 DIAGNOSIS — G47.33 OBSTRUCTIVE SLEEP APNEA (ADULT) (PEDIATRIC): ICD-10-CM

## 2020-11-02 DIAGNOSIS — J34.2 DEVIATED NASAL SEPTUM: ICD-10-CM

## 2020-11-02 DIAGNOSIS — G47.30 SLEEP APNEA, UNSPECIFIED: ICD-10-CM

## 2020-11-06 ENCOUNTER — APPOINTMENT (OUTPATIENT)
Dept: INFECTIOUS DISEASE | Facility: CLINIC | Age: 50
End: 2020-11-06
Payer: MEDICAID

## 2020-11-06 VITALS
HEIGHT: 75 IN | SYSTOLIC BLOOD PRESSURE: 157 MMHG | OXYGEN SATURATION: 98 % | HEART RATE: 95 BPM | TEMPERATURE: 98.7 F | WEIGHT: 242 LBS | DIASTOLIC BLOOD PRESSURE: 83 MMHG | BODY MASS INDEX: 30.09 KG/M2

## 2020-11-06 LAB
ALBUMIN SERPL ELPH-MCNC: 5.1 G/DL
ALP BLD-CCNC: 58 U/L
ALT SERPL-CCNC: 50 U/L
ANION GAP SERPL CALC-SCNC: 12 MMOL/L
AST SERPL-CCNC: 35 U/L
BILIRUB SERPL-MCNC: 0.5 MG/DL
BUN SERPL-MCNC: 7 MG/DL
CALCIUM SERPL-MCNC: 10.1 MG/DL
CD3 CELLS # BLD: 1658 /UL
CD3 CELLS NFR BLD: 70 %
CD3+CD4+ CELLS # BLD: 541 /UL
CD3+CD4+ CELLS NFR BLD: 23 %
CD3+CD4+ CELLS/CD3+CD8+ CLL SPEC: 0.52 RATIO
CD3+CD8+ CELLS # SPEC: 1045 /UL
CD3+CD8+ CELLS NFR BLD: 44 %
CHLORIDE SERPL-SCNC: 100 MMOL/L
CO2 SERPL-SCNC: 31 MMOL/L
CREAT SERPL-MCNC: 1.41 MG/DL
CYSTATIN C SERPL-MCNC: 1.14 MG/L
GFR/BSA.PRED SERPLBLD CYS-BASED-ARV: 68 ML/MIN
GLUCOSE SERPL-MCNC: 105 MG/DL
POTASSIUM SERPL-SCNC: 3.8 MMOL/L
PROT SERPL-MCNC: 8.1 G/DL
SODIUM SERPL-SCNC: 143 MMOL/L

## 2020-11-06 PROCEDURE — 90686 IIV4 VACC NO PRSV 0.5 ML IM: CPT

## 2020-11-06 PROCEDURE — 99214 OFFICE O/P EST MOD 30 MIN: CPT | Mod: 25

## 2020-11-06 PROCEDURE — 99072 ADDL SUPL MATRL&STAF TM PHE: CPT

## 2020-11-06 PROCEDURE — G0008: CPT

## 2020-11-06 NOTE — HISTORY OF PRESENT ILLNESS
[FreeTextEntry1] : 11/06/2020----VENU SANDOVAL is a 50 year old male being seen for a follow-up visit. Here for 4 month visit. .\par Diagnosed with AIDS in 2008. Now Cd4 over 500. Undetrectable VL. .\par recently went to dental 2020 and optho. \par went to ENT and pulmonology\par updated meds list 11/16/2020---Doing well on Genvoya, Amlodipine 5mg Pt also had recent stress test. . Pt to continue the Lisinopril-Hydrochlorothiazide 20-12.5 MG Oral Tablet and Vit D3 as well. rosuvastatin 10 mg from Dr. Jordy Peraza and famotidine 40mg daily . \par No family Hx of Colon Ca. \par Seen by  Dr. Craig and doing well. \par \par plan 11/6/2020: \par 1) continue Genvoya and all meds\par 2) labs today \par 3) flu vaccine \par 4) see in 6 months\par \par Pt states familal history of Multiple myloma. \par \par Sexual History: The patient is sexually active. The patient has anal intercourse with men. He is currently sexually active with 0 male partner(s). \par

## 2020-11-10 LAB
25(OH)D3 SERPL-MCNC: 46.1 NG/ML
AFP-TM SERPL-MCNC: 2.2 NG/ML
APPEARANCE: CLEAR
BACTERIA: NEGATIVE
BASOPHILS # BLD AUTO: 0.04 K/UL
BASOPHILS NFR BLD AUTO: 0.8 %
BILIRUBIN URINE: NEGATIVE
BLOOD URINE: NEGATIVE
C TRACH RRNA SPEC QL NAA+PROBE: NOT DETECTED
CHOLEST SERPL-MCNC: 222 MG/DL
COLOR: YELLOW
EOSINOPHIL # BLD AUTO: 0.1 K/UL
EOSINOPHIL NFR BLD AUTO: 2.1 %
ESTIMATED AVERAGE GLUCOSE: 117 MG/DL
GLUCOSE QUALITATIVE U: NEGATIVE
HBA1C MFR BLD HPLC: 5.7 %
HCT VFR BLD CALC: 41.4 %
HDLC SERPL-MCNC: 46 MG/DL
HGB BLD-MCNC: 13.4 G/DL
HIV1 RNA # SERPL NAA+PROBE: ABNORMAL
HIV1 RNA # SERPL NAA+PROBE: ABNORMAL COPIES/ML
HYALINE CASTS: 1 /LPF
IMM GRANULOCYTES NFR BLD AUTO: 0 %
KETONES URINE: NEGATIVE
LDLC SERPL CALC-MCNC: 126 MG/DL
LEUKOCYTE ESTERASE URINE: NEGATIVE
LYMPHOCYTES # BLD AUTO: 2.75 K/UL
LYMPHOCYTES NFR BLD AUTO: 56.6 %
MAN DIFF?: NORMAL
MCHC RBC-ENTMCNC: 29.1 PG
MCHC RBC-ENTMCNC: 32.4 GM/DL
MCV RBC AUTO: 90 FL
MICROSCOPIC-UA: NORMAL
MONOCYTES # BLD AUTO: 0.37 K/UL
MONOCYTES NFR BLD AUTO: 7.6 %
N GONORRHOEA RRNA SPEC QL NAA+PROBE: NOT DETECTED
NEUTROPHILS # BLD AUTO: 1.6 K/UL
NEUTROPHILS NFR BLD AUTO: 32.9 %
NITRITE URINE: NEGATIVE
NONHDLC SERPL-MCNC: 177 MG/DL
PH URINE: 7
PLATELET # BLD AUTO: 281 K/UL
PROTEIN URINE: NORMAL
PSA SERPL-MCNC: 1.52 NG/ML
RBC # BLD: 4.6 M/UL
RBC # FLD: 13 %
RED BLOOD CELLS URINE: 0 /HPF
SARS-COV-2 IGG SERPL IA-ACNC: 0.4 INDEX
SARS-COV-2 IGG SERPL QL IA: NEGATIVE
SOURCE AMPLIFICATION: NORMAL
SOURCE ANAL: NORMAL
SOURCE ORAL: NORMAL
SPECIFIC GRAVITY URINE: 1.01
SQUAMOUS EPITHELIAL CELLS: 1 /HPF
T PALLIDUM AB SER QL IA: NEGATIVE
TRIGL SERPL-MCNC: 254 MG/DL
TSH SERPL-ACNC: 1.5 UIU/ML
UROBILINOGEN URINE: NORMAL
VIRAL LOAD INTERP: NORMAL
VIRAL LOAD LOG: ABNORMAL LG COP/ML
WBC # FLD AUTO: 4.86 K/UL
WHITE BLOOD CELLS URINE: 0 /HPF

## 2020-12-18 ENCOUNTER — APPOINTMENT (OUTPATIENT)
Dept: DISASTER EMERGENCY | Facility: CLINIC | Age: 50
End: 2020-12-18

## 2020-12-20 LAB — SARS-COV-2 N GENE NPH QL NAA+PROBE: NOT DETECTED

## 2020-12-21 ENCOUNTER — APPOINTMENT (OUTPATIENT)
Dept: SLEEP CENTER | Facility: CLINIC | Age: 50
End: 2020-12-21
Payer: MEDICAID

## 2020-12-21 ENCOUNTER — OUTPATIENT (OUTPATIENT)
Dept: OUTPATIENT SERVICES | Facility: HOSPITAL | Age: 50
LOS: 1 days | End: 2020-12-21
Payer: MEDICAID

## 2020-12-21 PROCEDURE — 95811 POLYSOM 6/>YRS CPAP 4/> PARM: CPT | Mod: 26

## 2020-12-21 PROCEDURE — 95811 POLYSOM 6/>YRS CPAP 4/> PARM: CPT

## 2020-12-22 DIAGNOSIS — G47.33 OBSTRUCTIVE SLEEP APNEA (ADULT) (PEDIATRIC): ICD-10-CM

## 2020-12-30 ENCOUNTER — RX RENEWAL (OUTPATIENT)
Age: 50
End: 2020-12-30

## 2021-01-31 ENCOUNTER — RX RENEWAL (OUTPATIENT)
Age: 51
End: 2021-01-31

## 2021-02-01 ENCOUNTER — RX RENEWAL (OUTPATIENT)
Age: 51
End: 2021-02-01

## 2021-02-03 ENCOUNTER — NON-APPOINTMENT (OUTPATIENT)
Age: 51
End: 2021-02-03

## 2021-02-24 ENCOUNTER — APPOINTMENT (OUTPATIENT)
Dept: CARDIOLOGY | Facility: CLINIC | Age: 51
End: 2021-02-24
Payer: MEDICAID

## 2021-02-24 ENCOUNTER — NON-APPOINTMENT (OUTPATIENT)
Age: 51
End: 2021-02-24

## 2021-02-24 VITALS
HEIGHT: 75 IN | WEIGHT: 243 LBS | HEART RATE: 98 BPM | DIASTOLIC BLOOD PRESSURE: 89 MMHG | BODY MASS INDEX: 30.21 KG/M2 | SYSTOLIC BLOOD PRESSURE: 132 MMHG | OXYGEN SATURATION: 97 %

## 2021-02-24 PROCEDURE — 99072 ADDL SUPL MATRL&STAF TM PHE: CPT

## 2021-02-24 PROCEDURE — 93000 ELECTROCARDIOGRAM COMPLETE: CPT

## 2021-02-24 PROCEDURE — 99215 OFFICE O/P EST HI 40 MIN: CPT

## 2021-03-05 ENCOUNTER — APPOINTMENT (OUTPATIENT)
Dept: DISASTER EMERGENCY | Facility: CLINIC | Age: 51
End: 2021-03-05

## 2021-03-05 LAB — SARS-COV-2 N GENE NPH QL NAA+PROBE: NOT DETECTED

## 2021-03-18 ENCOUNTER — NON-APPOINTMENT (OUTPATIENT)
Age: 51
End: 2021-03-18

## 2021-03-25 ENCOUNTER — APPOINTMENT (OUTPATIENT)
Dept: PULMONOLOGY | Facility: CLINIC | Age: 51
End: 2021-03-25
Payer: MEDICAID

## 2021-03-25 VITALS — HEIGHT: 75 IN | BODY MASS INDEX: 30.46 KG/M2 | WEIGHT: 245 LBS

## 2021-03-25 PROCEDURE — 99214 OFFICE O/P EST MOD 30 MIN: CPT | Mod: 95

## 2021-03-25 NOTE — REASON FOR VISIT
[Home] : at home, [unfilled] , at the time of the visit. [Medical Office: ([facility])___] : at the medical office located in  [Verbal consent obtained from patient] : the patient, [unfilled] [Follow-Up] : a follow-up visit [Sleep Evaluation] : sleep evaluation

## 2021-03-25 NOTE — PHYSICAL EXAM
[No Acute Distress] : no acute distress [Well Nourished] : well nourished [No Deformities] : no deformities [Well Developed] : well developed [No Resp Distress] : no resp distress [Oriented x3] : oriented x3 [Normal Mood] : normal mood [Normal Affect] : normal affect [TextBox_11] : Unable to evaluate d/t video visit.  [TextBox_44] : Unable to evaluate d/t video visit.  [TextBox_54] : Unable to evaluate d/t video visit.  [TextBox_68] : Unable to evaluate d/t video visit.  [TextBox_80] : Unable to evaluate d/t video visit.  [TextBox_99] : Unable to evaluate d/t video visit.  [TextBox_105] : Unable to evaluate d/t video visit.  [TextBox_125] : Unable to evaluate d/t video visit.  [TextBox_132] : Unable to evaluate d/t video visit.

## 2021-03-25 NOTE — REVIEW OF SYSTEMS
[Fatigue] : fatigue [EDS] : eds [Nasal Congestion] : nasal congestion [Dry Mouth] : dry mouth [Sinus Problems] : sinus problems [Negative] : Endocrine

## 2021-03-25 NOTE — DISCUSSION/SUMMARY
[FreeTextEntry1] : - 9/8/2020 HST showed AHI of 72 w/ SPO2 <88% for 142.7 mins\par \par - 12/21/20 CPAP Titration showed:\par Respiratory parameters: \par The frequency of sleep disordered breathing was reduced but not normalized with CPAP. The ECG showed sinus rhythm.\par IMPRESSION:\par The frequency of sleep disordered breathing was reduced but not normalized with CPAP at pressures up to 17 cm H2O. Recommend repeat PAP titration. Consider CPAP titration with pressure starting at 15 cm H2O, but may require transition to bilevel. Clinical correlation is suggested.\par Mask type and size: F&P Simplus, Medium, Full face mask. \par \par Discussed CPAP did not adequately treat CLARI.  Explained options for:\par 1. Dispensing APAP Device w/ pressures of 15-17 CMH2O. Explained there will be improvement, but may not be adequate treatment.\par 2. Patient can have Bipap titration study.\par \par Patient agreeable to Bipap titration study to evaluate for adequate treatment. \par Patient states he would like to have surgery to correct his deviated septum and then have HST re-test to see if CLARI remains present or if that cures him of it. \par

## 2021-03-25 NOTE — ASSESSMENT
[FreeTextEntry1] : Mr. SANDOVAL is a 50 year old male with a history of HIV "AIDS", arthritis, HSV 2, GERD, Cardiac arrhythmia, HTN, low testosterone, neuropathy, CLARI, low vitamin D, allergies who video calls into the office today for pulmonary evaluation- c/w Severe OSAS.\par \par 1. CLARI: \par - I have discussed all the negative health consequences associated with obstructive and central sleep apnea including heart conditions/MI, hypertension, diabetes, chronic inflammation, memory issues, stroke, obesity, decreased libido, sleep related accidents, as well as anxiety and depression. Additional recommendations included: Avoid alcohol and sedatives at bedtime. Proper sleep hygiene such as maintaining a regular sleep routine, avoiding naps if possible, not watching TV or reading in bed,  and maintaining a quiet, comfortable bedroom. Sleepy driving avoidance and risks discussed with patient. Diet, exercise and weight loss suggested.\par - Patient to have Bilevel titration study to see if adequate treatment occurs. \par \par Patient to follow up with Dr. Parrish as scheduled.\par Patient to call with further questions and concerns.\par Patient verbalizes understanding of care plan and is agreeable.\par

## 2021-04-26 ENCOUNTER — RX RENEWAL (OUTPATIENT)
Age: 51
End: 2021-04-26

## 2021-04-30 ENCOUNTER — TRANSCRIPTION ENCOUNTER (OUTPATIENT)
Age: 51
End: 2021-04-30

## 2021-05-06 ENCOUNTER — FORM ENCOUNTER (OUTPATIENT)
Age: 51
End: 2021-05-06

## 2021-05-07 ENCOUNTER — APPOINTMENT (OUTPATIENT)
Dept: INFECTIOUS DISEASE | Facility: CLINIC | Age: 51
End: 2021-05-07

## 2021-05-07 ENCOUNTER — APPOINTMENT (OUTPATIENT)
Dept: INFECTIOUS DISEASE | Facility: CLINIC | Age: 51
End: 2021-05-07
Payer: COMMERCIAL

## 2021-05-07 VITALS
HEIGHT: 75 IN | DIASTOLIC BLOOD PRESSURE: 88 MMHG | RESPIRATION RATE: 16 BRPM | OXYGEN SATURATION: 96 % | TEMPERATURE: 98.7 F | SYSTOLIC BLOOD PRESSURE: 150 MMHG | HEART RATE: 82 BPM | WEIGHT: 244 LBS | BODY MASS INDEX: 30.34 KG/M2

## 2021-05-07 LAB
25(OH)D3 SERPL-MCNC: 63.9 NG/ML
ALBUMIN SERPL ELPH-MCNC: 4.9 G/DL
ALP BLD-CCNC: 60 U/L
ALT SERPL-CCNC: 65 U/L
ANION GAP SERPL CALC-SCNC: 13 MMOL/L
AST SERPL-CCNC: 42 U/L
BASOPHILS # BLD AUTO: 0.02 K/UL
BASOPHILS NFR BLD AUTO: 0.4 %
BILIRUB SERPL-MCNC: 0.4 MG/DL
BUN SERPL-MCNC: 10 MG/DL
CALCIUM SERPL-MCNC: 10.2 MG/DL
CD3 CELLS # BLD: 1630 /UL
CD3 CELLS NFR BLD: 71 %
CD3+CD4+ CELLS # BLD: 549 /UL
CD3+CD4+ CELLS NFR BLD: 24 %
CD3+CD4+ CELLS/CD3+CD8+ CLL SPEC: 0.55 RATIO
CD3+CD8+ CELLS # SPEC: 996 /UL
CD3+CD8+ CELLS NFR BLD: 44 %
CHLORIDE SERPL-SCNC: 103 MMOL/L
CHOLEST SERPL-MCNC: 172 MG/DL
CO2 SERPL-SCNC: 28 MMOL/L
CREAT SERPL-MCNC: 1.32 MG/DL
CYSTATIN C SERPL-MCNC: 1.11 MG/L
EOSINOPHIL # BLD AUTO: 0.09 K/UL
EOSINOPHIL NFR BLD AUTO: 1.9 %
GFR/BSA.PRED SERPLBLD CYS-BASED-ARV: 71 ML/MIN
GLUCOSE SERPL-MCNC: 109 MG/DL
HCT VFR BLD CALC: 40.3 %
HDLC SERPL-MCNC: 47 MG/DL
HGB BLD-MCNC: 13.4 G/DL
IMM GRANULOCYTES NFR BLD AUTO: 0.2 %
LDLC SERPL CALC-MCNC: 95 MG/DL
LYMPHOCYTES # BLD AUTO: 2.33 K/UL
LYMPHOCYTES NFR BLD AUTO: 49.7 %
MAN DIFF?: NORMAL
MCHC RBC-ENTMCNC: 29.7 PG
MCHC RBC-ENTMCNC: 33.3 GM/DL
MCV RBC AUTO: 89.4 FL
MONOCYTES # BLD AUTO: 0.44 K/UL
MONOCYTES NFR BLD AUTO: 9.4 %
NEUTROPHILS # BLD AUTO: 1.8 K/UL
NEUTROPHILS NFR BLD AUTO: 38.4 %
NONHDLC SERPL-MCNC: 126 MG/DL
PLATELET # BLD AUTO: 264 K/UL
POTASSIUM SERPL-SCNC: 4 MMOL/L
PROT SERPL-MCNC: 8 G/DL
RBC # BLD: 4.51 M/UL
RBC # FLD: 13 %
SODIUM SERPL-SCNC: 144 MMOL/L
TRIGL SERPL-MCNC: 152 MG/DL
WBC # FLD AUTO: 4.69 K/UL

## 2021-05-07 PROCEDURE — 99072 ADDL SUPL MATRL&STAF TM PHE: CPT

## 2021-05-07 PROCEDURE — 99214 OFFICE O/P EST MOD 30 MIN: CPT

## 2021-05-07 NOTE — PHYSICAL EXAM
[General Appearance - Alert] : alert [General Appearance - In No Acute Distress] : in no acute distress [Sclera] : the sclera and conjunctiva were normal [PERRL With Normal Accommodation] : pupils were equal in size, round, reactive to light [Extraocular Movements] : extraocular movements were intact [Outer Ear] : the ears and nose were normal in appearance [Oropharynx] : the oropharynx was normal with no thrush [Neck Appearance] : the appearance of the neck was normal [Neck Cervical Mass (___cm)] : no neck mass was observed [Jugular Venous Distention Increased] : there was no jugular-venous distention [Thyroid Diffuse Enlargement] : the thyroid was not enlarged [Auscultation Breath Sounds / Voice Sounds] : lungs were clear to auscultation bilaterally [Heart Rate And Rhythm] : heart rate was normal and rhythm regular [Heart Sounds] : normal S1 and S2 [Murmurs] : no murmurs [Heart Sounds Gallop] : no gallops [Heart Sounds Pericardial Friction Rub] : no pericardial rub [Full Pulse] : the pedal pulses are present [Edema] : there was no peripheral edema [Bowel Sounds] : normal bowel sounds [Abdomen Soft] : soft [Abdomen Tenderness] : non-tender [Abdomen Mass (___ Cm)] : no abdominal mass palpated [Costovertebral Angle Tenderness] : no CVA tenderness [No Palpable Adenopathy] : no palpable adenopathy [Musculoskeletal - Swelling] : no joint swelling [Nail Clubbing] : no clubbing  or cyanosis of the fingernails [Motor Tone] : muscle strength and tone were normal [Skin Color & Pigmentation] : normal skin color and pigmentation [] : no rash [Oriented To Time, Place, And Person] : oriented to person, place, and time [Affect] : the affect was normal

## 2021-05-07 NOTE — HISTORY OF PRESENT ILLNESS
[FreeTextEntry1] : \par  History of Present Illness\par 5/07/2021----VENU SANDOVAL is a 50 year old male being seen for a follow-up visit. Here for 4 month visit. .\par Diagnosed with AIDS in 2008. Now Cd4 over 500. Undetrectable VL. .\par recently went to dental 2020 and optho. \par went to ENT and pulmonology and Jordy Peraza for lipids and HTN. \par updated meds list 5/7/2021---Doing well on Genvoya, Amlodipine 5mg Pt also had recent stress test . Pt to continue the Lisinopril-Hydrochlorothiazide 20-12.5 MG Oral Tablet and Vit D3 as well. rosuvastatin 10 mg from Dr. Jordy Peraza and famotidine 40mg daily . \par No family Hx of Colon Ca. \par Seen by Dr. Craig and doing well. \par having colonoscopy. in a few months\par plan 5/7/2021: \par 1) continue Genvoya and all meds\par 2) labs today \par 3) had pfizer covid vaccine\par 4) labs today  see in 6 months and see nutrition \par \par Pt states familal history of Multiple myloma. \par \par Sexual History: The patient is sexually active. The patient has anal intercourse with men. He is currently sexually active with 0 male partner(s). 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)\par Hyperlipidemia, mild (272.4) (E78.5)\par Hypertension (401.9) (I10)\par Insomnia (780.52) (G47.00)\par Intermittent memory loss (780.93) (R41.3)\par Low testosterone (790.99) (R79.89)\par Need for Menactra vaccination (V03.89) (Z23)\par Neuropathy involving both lower extremities (356.9) (G57.93)\par CLARI (obstructive sleep apnea) (327.23) (G47.33)\par Osteoarthritis of both hips (715.95) (M16.0)\par Preop testing (V72.84) (Z01.818)\par Preop testing (V72.84) (Z01.818)\par Pre-procedural laboratory examination (V72.63) (Z01.812)\par Prostate cancer screening (V76.44) (Z12.5)\par Rash (782.1) (R21)\par Scoliosis (737.30) (M41.9)\par Shortness of breath on exertion (786.05) (R06.02)\par Sickle cell trait (282.5) (D57.3)\par Skin rash (782.1) (R21)\par Sleep apnea (780.57) (G47.30)\par Vitamin D deficiency (268.9) (E55.9)\par \par Past Medical History\par History of blood transfusion (V15.89) (Z92.89)\par History of candidiasis of mouth (V12.09) (Z86.19)\par History of complete eye exam (V15.89) (Z92.89)\par History of dental examination (V15.89) (Z92.89)\par History of gonorrhea (V12.09) (Z86.19)\par History of hemorrhoids (V13.89) (Z87.19)\par History of pneumocystis pneumonia (V12.61) (Z87.01)\par History of Hypophosphatemia (275.3) (E83.39)\par History of Perirectal ulcer (569.41) (K62.6)\par History of Walking pneumonia (486) (J18.9)\par \par Current Meds\par amLODIPine Besylate 5 MG Oral Tablet; TAKE ONE TABLET BY MOUTH DAILY\par Azelastine HCl - 0.1 % Nasal Solution; INHALE 2 SQUIRT Twice daily\par Diclofenac Sodium 75 MG Oral Tablet Delayed Release; Take 1 tablet twice daily\par Famotidine 40 MG Oral Tablet; TAKE 1 TABLET AT BEDTIME\par Genvoya 213-346-100-10 MG Oral Tablet; TAKE 1 TABLET BY MOUTH DAILY\par Lisinopril-hydroCHLOROthiazide 20-12.5 MG Oral Tablet; TAKE ONE TABLET BY MOUTH\par DAILY\par Rosuvastatin Calcium 10 MG Oral Tablet; TAKE 1 TABLET DAILY\par Vitamin D (Ergocalciferol) 1.25 MG (69200 UT) Oral Capsule; TAKE 1 CAPSULE BY\par MOUTH EVERY 2 WEEKS\par \par Allergies\par No Known Drug Allergies\par Animal dander - Cats\par Mold\par

## 2021-05-10 LAB
C TRACH RRNA SPEC QL NAA+PROBE: NOT DETECTED
ESTIMATED AVERAGE GLUCOSE: 117 MG/DL
HBA1C MFR BLD HPLC: 5.7 %
HCV AB SER QL: NONREACTIVE
HCV S/CO RATIO: 0.14 S/CO
HIV1 RNA # SERPL NAA+PROBE: NORMAL
HIV1 RNA # SERPL NAA+PROBE: NORMAL COPIES/ML
N GONORRHOEA RRNA SPEC QL NAA+PROBE: NOT DETECTED
SOURCE AMPLIFICATION: NORMAL
SOURCE ANAL: NORMAL
SOURCE ORAL: NORMAL
T PALLIDUM AB SER QL IA: NEGATIVE
VIRAL LOAD INTERP: NORMAL
VIRAL LOAD LOG: NORMAL LG COP/ML

## 2021-05-13 ENCOUNTER — NON-APPOINTMENT (OUTPATIENT)
Age: 51
End: 2021-05-13

## 2021-05-24 LAB
HIV GENOSURE ARCHIVE 1: NORMAL
HIV1 PROVIR DNA RT + PR + IN MUT DET SEQ: NORMAL
HIV1 PROVIRAL DNA GENTYP BLD MC NAR: NORMAL

## 2021-06-17 ENCOUNTER — APPOINTMENT (OUTPATIENT)
Dept: GASTROENTEROLOGY | Facility: CLINIC | Age: 51
End: 2021-06-17
Payer: COMMERCIAL

## 2021-06-17 VITALS
WEIGHT: 159 LBS | DIASTOLIC BLOOD PRESSURE: 88 MMHG | TEMPERATURE: 98 F | HEART RATE: 103 BPM | OXYGEN SATURATION: 98 % | BODY MASS INDEX: 19.77 KG/M2 | SYSTOLIC BLOOD PRESSURE: 110 MMHG | HEIGHT: 75 IN

## 2021-06-17 DIAGNOSIS — R10.813 RIGHT LOWER QUADRANT ABDOMINAL TENDERNESS: ICD-10-CM

## 2021-06-17 PROCEDURE — 99072 ADDL SUPL MATRL&STAF TM PHE: CPT

## 2021-06-17 PROCEDURE — 99204 OFFICE O/P NEW MOD 45 MIN: CPT

## 2021-06-18 LAB
ALBUMIN SERPL ELPH-MCNC: 5.1 G/DL
ALP BLD-CCNC: 66 U/L
ALT SERPL-CCNC: 54 U/L
AMYLASE/CREAT SERPL: 102 U/L
ANION GAP SERPL CALC-SCNC: 14 MMOL/L
AST SERPL-CCNC: 43 U/L
BASOPHILS # BLD AUTO: 0.03 K/UL
BASOPHILS NFR BLD AUTO: 0.6 %
BILIRUB SERPL-MCNC: 0.8 MG/DL
BUN SERPL-MCNC: 10 MG/DL
CALCIUM SERPL-MCNC: 10 MG/DL
CHLORIDE SERPL-SCNC: 99 MMOL/L
CO2 SERPL-SCNC: 28 MMOL/L
CREAT SERPL-MCNC: 1.47 MG/DL
EOSINOPHIL # BLD AUTO: 0.04 K/UL
EOSINOPHIL NFR BLD AUTO: 0.8 %
GLUCOSE SERPL-MCNC: 83 MG/DL
HCT VFR BLD CALC: 40 %
HGB BLD-MCNC: 13.2 G/DL
IMM GRANULOCYTES NFR BLD AUTO: 0.2 %
LPL SERPL-CCNC: 34 U/L
LYMPHOCYTES # BLD AUTO: 2.6 K/UL
LYMPHOCYTES NFR BLD AUTO: 51.4 %
MAN DIFF?: NORMAL
MCHC RBC-ENTMCNC: 29.2 PG
MCHC RBC-ENTMCNC: 33 GM/DL
MCV RBC AUTO: 88.5 FL
MONOCYTES # BLD AUTO: 0.41 K/UL
MONOCYTES NFR BLD AUTO: 8.1 %
NEUTROPHILS # BLD AUTO: 1.97 K/UL
NEUTROPHILS NFR BLD AUTO: 38.9 %
PLATELET # BLD AUTO: 297 K/UL
POTASSIUM SERPL-SCNC: 3.8 MMOL/L
PROT SERPL-MCNC: 8.2 G/DL
RBC # BLD: 4.52 M/UL
RBC # FLD: 13 %
SODIUM SERPL-SCNC: 141 MMOL/L
WBC # FLD AUTO: 5.06 K/UL

## 2021-06-18 NOTE — HISTORY OF PRESENT ILLNESS
[FreeTextEntry1] : Patient referred for colon cancer screening, also has right lower quadrant pain starting 1 week ago.\par \par New onset pain started last Wednesday.  No precipitating factor.  Associated with night sweats.  No fevers, chills, sweats, abdominal bloating, nausea, vomiting, constipation, diarrhea, melena, hematochezia, weight loss.\par \par Has heartburn once to 2 times weekly starting 1 to 2 years ago.  No dysphagia, odynophagia, regurgitation.  Takes acid suppressing medication.\par \par Review of laboratory data notable for elevated AST/ALT in the 40s to 60s range.\par \par No relevant abdominal imaging in the system.\par \par

## 2021-06-18 NOTE — ASSESSMENT
[FreeTextEntry1] : Impression:\par \par #1.  Right lower quadrant abdominal pain and tenderness x1 week\par \par Differential diagnosis is broad but includes appendicitis\par \par #2.  Heartburn times approximately 1 year.\par \par #3.  HIV on antiretroviral medications.\par \par #4.  Obstructive sleep apnea\par \par #5.  Coronary artery disease\par \par Recommendations:\par \par #1.  Labs as below.\par \par #2.  CT scan abdomen pelvis with p.o. and IV contrast\par \par #3.  Upper endoscopy and colonoscopy\par \par Risks, benefits, alternatives of the procedure were discussed with the patient and the patient was educated about the procedure. Risks include, but are not limited to, bleeding, infection, injury to internal organs, possible need for further procedures including emergency surgery, missed lesions, risk of anesthesia, and risk of IV site problems.  Patient understands and agrees to proceed.\par \par #4.  Suprep ordered.

## 2021-06-18 NOTE — CONSULT LETTER
[Dear  ___] : Dear  [unfilled], [Please see my note below.] : Please see my note below. [Consult Letter:] : I had the pleasure of evaluating your patient, [unfilled]. [Consult Closing:] : Thank you very much for allowing me to participate in the care of this patient.  If you have any questions, please do not hesitate to contact me. [Sincerely,] : Sincerely, [FreeTextEntry3] : Michael Conte MD, MPH, FASGE\par Chief of Clinical Quality in Gastroenterology, [facility]\par Associate Chief of Gastroenterology, [facility]\par Director of Endoscopic Ultrasound, [facility]\par [address], [address]\par [place], [address]\par 24 hours [phone]\par \par  [DrAbdifatah  ___] : Dr. KENNY [DrAbdifatah ___] : Dr. KENNY

## 2021-06-28 ENCOUNTER — RX RENEWAL (OUTPATIENT)
Age: 51
End: 2021-06-28

## 2021-07-12 ENCOUNTER — RESULT REVIEW (OUTPATIENT)
Age: 51
End: 2021-07-12

## 2021-07-13 ENCOUNTER — APPOINTMENT (OUTPATIENT)
Dept: CT IMAGING | Facility: IMAGING CENTER | Age: 51
End: 2021-07-13
Payer: COMMERCIAL

## 2021-07-13 ENCOUNTER — OUTPATIENT (OUTPATIENT)
Dept: OUTPATIENT SERVICES | Facility: HOSPITAL | Age: 51
LOS: 1 days | End: 2021-07-13
Payer: COMMERCIAL

## 2021-07-13 DIAGNOSIS — Z00.8 ENCOUNTER FOR OTHER GENERAL EXAMINATION: ICD-10-CM

## 2021-07-13 PROCEDURE — 74177 CT ABD & PELVIS W/CONTRAST: CPT | Mod: 26

## 2021-07-13 PROCEDURE — 82565 ASSAY OF CREATININE: CPT

## 2021-07-13 PROCEDURE — 74177 CT ABD & PELVIS W/CONTRAST: CPT

## 2021-07-15 NOTE — PRE PROCEDURE NOTE - PRE PROCEDURE EVALUATION
Attending Physician:  Dr. Michael Conte                           Procedure: EGD / Colonoscopy     Indication for Procedure: Abdominal pain; heartburn; CRC screening   ________________________________________________________  PAST MEDICAL & SURGICAL HISTORY:    ALLERGIES:  No Known Allergies    HOME MEDICATIONS:  amLODIPine 5 mg oral tablet: 1 tab(s) orally once a day  Genvoya oral tablet: 1 tab(s) orally once a day  lisinopril-hydrochlorothiazide 20 mg-12.5 mg oral tablet: 1 tab(s) orally once a day    AICD/PPM: [ ] yes   [ ] no    PERTINENT LAB DATA:                      PHYSICAL EXAMINATION:    T(C): --  HR: --  BP: --  RR: --  SpO2: --    Constitutional: NAD  HEENT: PERRLA, EOMI,    Neck:  No JVD  Respiratory: CTAB/L  Cardiovascular: S1 and S2  Gastrointestinal: BS+, soft, NT/ND  Extremities: No peripheral edema  Neurological: A/O x 3, no focal deficits  Psychiatric: Normal mood, normal affect  Skin: No rashes    ASA Class: I [ ]  II [ ]  III [ ]  IV [ ]    COMMENTS:    The patient is a suitable candidate for the planned procedure unless box checked [ ]  No, explain:

## 2021-07-16 ENCOUNTER — OUTPATIENT (OUTPATIENT)
Dept: OUTPATIENT SERVICES | Facility: HOSPITAL | Age: 51
LOS: 1 days | End: 2021-07-16
Payer: COMMERCIAL

## 2021-07-16 ENCOUNTER — RESULT REVIEW (OUTPATIENT)
Age: 51
End: 2021-07-16

## 2021-07-16 ENCOUNTER — APPOINTMENT (OUTPATIENT)
Dept: GASTROENTEROLOGY | Facility: HOSPITAL | Age: 51
End: 2021-07-16

## 2021-07-16 VITALS
DIASTOLIC BLOOD PRESSURE: 98 MMHG | RESPIRATION RATE: 18 BRPM | SYSTOLIC BLOOD PRESSURE: 131 MMHG | HEART RATE: 70 BPM | OXYGEN SATURATION: 99 %

## 2021-07-16 VITALS
RESPIRATION RATE: 21 BRPM | SYSTOLIC BLOOD PRESSURE: 125 MMHG | DIASTOLIC BLOOD PRESSURE: 82 MMHG | HEART RATE: 81 BPM | WEIGHT: 233.03 LBS | OXYGEN SATURATION: 99 % | HEIGHT: 75 IN | TEMPERATURE: 97 F

## 2021-07-16 DIAGNOSIS — M20.41 OTHER HAMMER TOE(S) (ACQUIRED), RIGHT FOOT: Chronic | ICD-10-CM

## 2021-07-16 DIAGNOSIS — Z00.00 ENCOUNTER FOR GENERAL ADULT MEDICAL EXAMINATION WITHOUT ABNORMAL FINDINGS: ICD-10-CM

## 2021-07-16 PROCEDURE — 43239 EGD BIOPSY SINGLE/MULTIPLE: CPT

## 2021-07-16 PROCEDURE — 43239 EGD BIOPSY SINGLE/MULTIPLE: CPT | Mod: GC,59

## 2021-07-16 PROCEDURE — 88305 TISSUE EXAM BY PATHOLOGIST: CPT | Mod: 26

## 2021-07-16 PROCEDURE — 45378 DIAGNOSTIC COLONOSCOPY: CPT

## 2021-07-16 PROCEDURE — 45378 DIAGNOSTIC COLONOSCOPY: CPT | Mod: GC,33,53

## 2021-07-16 PROCEDURE — 88305 TISSUE EXAM BY PATHOLOGIST: CPT

## 2021-07-16 RX ORDER — SODIUM CHLORIDE 9 MG/ML
500 INJECTION INTRAMUSCULAR; INTRAVENOUS; SUBCUTANEOUS
Refills: 0 | Status: COMPLETED | OUTPATIENT
Start: 2021-07-16 | End: 2021-07-16

## 2021-07-16 RX ADMIN — SODIUM CHLORIDE 75 MILLILITER(S): 9 INJECTION INTRAMUSCULAR; INTRAVENOUS; SUBCUTANEOUS at 10:17

## 2021-07-16 NOTE — ASU DISCHARGE PLAN (ADULT/PEDIATRIC) - ASU DC SPECIAL INSTRUCTIONSFT
Follow-up with your referring provider, Dr. Fran Leiva. Follow-up with your referring provider, Dr. Fran Leiva.  Please schedule a CT colonography as the colonoscopy was incomplete.

## 2021-07-19 ENCOUNTER — APPOINTMENT (OUTPATIENT)
Dept: DISASTER EMERGENCY | Facility: CLINIC | Age: 51
End: 2021-07-19

## 2021-07-20 LAB — SURGICAL PATHOLOGY STUDY: SIGNIFICANT CHANGE UP

## 2021-07-26 ENCOUNTER — RX RENEWAL (OUTPATIENT)
Age: 51
End: 2021-07-26

## 2021-08-02 ENCOUNTER — APPOINTMENT (OUTPATIENT)
Dept: DISASTER EMERGENCY | Facility: CLINIC | Age: 51
End: 2021-08-02

## 2021-08-03 PROBLEM — I10 ESSENTIAL (PRIMARY) HYPERTENSION: Chronic | Status: ACTIVE | Noted: 2021-07-16

## 2021-08-03 PROBLEM — B20 HUMAN IMMUNODEFICIENCY VIRUS [HIV] DISEASE: Chronic | Status: ACTIVE | Noted: 2021-07-16

## 2021-08-03 PROBLEM — E78.00 PURE HYPERCHOLESTEROLEMIA, UNSPECIFIED: Chronic | Status: ACTIVE | Noted: 2021-07-16

## 2021-08-08 ENCOUNTER — APPOINTMENT (OUTPATIENT)
Dept: DISASTER EMERGENCY | Facility: CLINIC | Age: 51
End: 2021-08-08

## 2021-08-11 ENCOUNTER — APPOINTMENT (OUTPATIENT)
Dept: SLEEP CENTER | Facility: CLINIC | Age: 51
End: 2021-08-11
Payer: COMMERCIAL

## 2021-08-11 ENCOUNTER — OUTPATIENT (OUTPATIENT)
Dept: OUTPATIENT SERVICES | Facility: HOSPITAL | Age: 51
LOS: 1 days | End: 2021-08-11
Payer: COMMERCIAL

## 2021-08-11 DIAGNOSIS — M20.41 OTHER HAMMER TOE(S) (ACQUIRED), RIGHT FOOT: Chronic | ICD-10-CM

## 2021-08-11 PROCEDURE — 95811 POLYSOM 6/>YRS CPAP 4/> PARM: CPT

## 2021-08-11 PROCEDURE — 95811 POLYSOM 6/>YRS CPAP 4/> PARM: CPT | Mod: 26

## 2021-08-12 DIAGNOSIS — G47.33 OBSTRUCTIVE SLEEP APNEA (ADULT) (PEDIATRIC): ICD-10-CM

## 2021-08-17 LAB — SARS-COV-2 N GENE NPH QL NAA+PROBE: NOT DETECTED

## 2021-08-18 ENCOUNTER — RX RENEWAL (OUTPATIENT)
Age: 51
End: 2021-08-18

## 2021-08-25 ENCOUNTER — NON-APPOINTMENT (OUTPATIENT)
Age: 51
End: 2021-08-25

## 2021-08-25 ENCOUNTER — APPOINTMENT (OUTPATIENT)
Dept: CARDIOLOGY | Facility: CLINIC | Age: 51
End: 2021-08-25
Payer: COMMERCIAL

## 2021-08-25 VITALS
BODY MASS INDEX: 29.34 KG/M2 | WEIGHT: 236 LBS | HEART RATE: 81 BPM | HEIGHT: 75 IN | DIASTOLIC BLOOD PRESSURE: 87 MMHG | SYSTOLIC BLOOD PRESSURE: 129 MMHG | OXYGEN SATURATION: 98 %

## 2021-08-25 PROCEDURE — 93000 ELECTROCARDIOGRAM COMPLETE: CPT

## 2021-08-25 PROCEDURE — 99215 OFFICE O/P EST HI 40 MIN: CPT

## 2021-08-27 ENCOUNTER — APPOINTMENT (OUTPATIENT)
Dept: PULMONOLOGY | Facility: CLINIC | Age: 51
End: 2021-08-27
Payer: COMMERCIAL

## 2021-08-27 PROCEDURE — 99443: CPT

## 2021-08-31 ENCOUNTER — RX RENEWAL (OUTPATIENT)
Age: 51
End: 2021-08-31

## 2021-09-09 ENCOUNTER — APPOINTMENT (OUTPATIENT)
Dept: CARDIOLOGY | Facility: CLINIC | Age: 51
End: 2021-09-09
Payer: SELF-PAY

## 2021-09-09 PROCEDURE — 97802 MEDICAL NUTRITION INDIV IN: CPT | Mod: 95

## 2021-10-19 ENCOUNTER — APPOINTMENT (OUTPATIENT)
Dept: GASTROENTEROLOGY | Facility: CLINIC | Age: 51
End: 2021-10-19
Payer: COMMERCIAL

## 2021-10-19 VITALS
OXYGEN SATURATION: 88 % | TEMPERATURE: 98.3 F | WEIGHT: 240 LBS | HEART RATE: 96 BPM | DIASTOLIC BLOOD PRESSURE: 88 MMHG | BODY MASS INDEX: 29.84 KG/M2 | SYSTOLIC BLOOD PRESSURE: 127 MMHG | HEIGHT: 75 IN

## 2021-10-19 DIAGNOSIS — R10.31 RIGHT LOWER QUADRANT PAIN: ICD-10-CM

## 2021-10-19 DIAGNOSIS — R12 HEARTBURN: ICD-10-CM

## 2021-10-19 DIAGNOSIS — Z12.11 ENCOUNTER FOR SCREENING FOR MALIGNANT NEOPLASM OF COLON: ICD-10-CM

## 2021-10-19 PROCEDURE — 99213 OFFICE O/P EST LOW 20 MIN: CPT

## 2021-10-19 RX ORDER — SODIUM SULFATE, POTASSIUM SULFATE, MAGNESIUM SULFATE 17.5; 3.13; 1.6 G/ML; G/ML; G/ML
17.5-3.13-1.6 SOLUTION, CONCENTRATE ORAL
Qty: 1 | Refills: 0 | Status: DISCONTINUED | COMMUNITY
Start: 2021-06-17 | End: 2021-10-19

## 2021-10-21 PROBLEM — R10.31 INTERMITTENT RIGHT LOWER QUADRANT ABDOMINAL PAIN: Status: ACTIVE | Noted: 2021-10-21

## 2021-10-21 PROBLEM — Z12.11 COLON CANCER SCREENING: Status: ACTIVE | Noted: 2021-10-19

## 2021-10-21 PROBLEM — R12 HEARTBURN: Status: ACTIVE | Noted: 2021-06-18

## 2021-10-21 NOTE — ASSESSMENT
[FreeTextEntry1] : Impression:\par \par #1. Right lower quadrant abdominal pain and tenderness x1 week.  Resolved spontaneously.\par No obvious etiology on CT scan, aside from mild bladder thickening.\par Colonoscopy incomplete to ascending colon.\par \par #2. Rare food induced heartburn times approximately 1 year, responsive to Pepcid PRN.  2 cm hiatal hernia without esophagitis on EGD.\par \par #3.  Mild renal insufficiency, creatinine 1.5 \par \par #4.  HIV on antiretroviral medications.\par \par #5. Obstructive sleep apnea, on CPAP planned for BIPAP\par \par #6. Coronary artery disease\par \par Recommendations:\par \par #1.  Labs as below for CT scan\par \par #2.  CT colonography to fully evaluate colon.\par \par #3.  6 month followup if necessary.

## 2021-10-21 NOTE — HISTORY OF PRESENT ILLNESS
[FreeTextEntry1] : Patient follows up for heartburn and colon cancer screening.\par \par His heartburn is well controlled with Pepcid if necessary. It is induced by certain foods. He takes this approximately once every several weeks.\par \par RLQ abd pain resolved.\par \par No fevers, chills, sweats, abdominal bloating, nausea, vomiting, constipation, diarrhea, melena, hematochezia, weight loss.\par \par Laboratory data reviewed.  No significant findings on CBC.  Normal bilirubin, alkaline phosphatase, amylase, lipase.  Comprehensive metabolic panel significant for creatinine 1.47, AST 43, ALT 54.\par \par CT scan of abdomen and pelvis after last office visit demonstrated no acute findings. \par \par \par CT Abdomen and Pelvis w/ Oral Cont and w/ IV Cont             Final\par INTERPRETATION:  CLINICAL INFORMATION: Elevated LFTs. Right lower quadrant abdominal pain and tenderness.\par \par COMPARISON: None.\par \par CONTRAST/COMPLICATIONS:\par IV Contrast: 90 mls of Omnipaque 350 was administered    10 mls discarded.\par Oral Contrast: Omnipaque 300\par Complications: NONE\par \par TECHNIQUE:  CT scan of the abdomen and pelvis with IV contrast.\par Transaxial images were acquired from the domes of the diaphragm to the symphysis pubis with intravenous contrast. Oral contrast was administered. Coronal and sagittal images were also provided from the transaxial source data.\par \par FINDINGS:\par The heart is not enlarged. The lung bases are clear.\par \par The large and small bowel are normal in caliber without obstruction. There is no free intraperitoneal air or abdominal abscess.  The appendix is normal. There is no abnormal bowel wall thickening or inflammatory change.\par \par The liver is enlarged and of diffuse low attenuation compatible with hepatomegaly and hepatic steatosis. Questionable layering sludge within the gallbladder. The spleen, pancreas and adrenal glands are normal.  The kidneys enhance symmetrically without hydronephrosis.\par \par The abdominal aorta is normal in caliber. There is no retroperitoneal, pelvic or inguinal adenopathy. There is no ascites.\par \par Slight circumferential bladder wall thickening for the degree of distention. The prostate gland is mildly enlarged measuring up to 5.2 cm in transaxial dimension.\par \par The visualized osseous structures demonstrate no acute abnormality. Degenerative changes are present in the hips and lumbar spine.\par \par IMPRESSION:\par Normal appendix. No bowel wall thickening or inflammatory change.\par Hepatomegaly and hepatic steatosis.? Gallbladder sludge.\par Slight circumferential bladder wall thickening. Correlation with urinalysis and urine culture can be done to exclude cystitis.\par \par \par Upper endoscopy demonstrated 2 cm hiatal hernia, without erosive esophagitis. Mild gastric erythema, biopsies benign without H. pylori or intestinal metaplasia.\par \par Colonoscopy was incomplete, the  extent reached was ascending colon. This was due to excessive looping in  a redundant colon.  Preparation was excellent.

## 2021-10-21 NOTE — PHYSICAL EXAM
[General Appearance - Alert] : alert [General Appearance - In No Acute Distress] : in no acute distress [Sclera] : the sclera and conjunctiva were normal [Bowel Sounds] : normal bowel sounds [Abdomen Soft] : soft [Abdomen Tenderness] : non-tender [] : no hepato-splenomegaly [Abdomen Mass (___ Cm)] : no abdominal mass palpated [Abnormal Walk] : normal gait [FreeTextEntry1] : No confusion [Affect] : the affect was normal

## 2021-10-21 NOTE — CONSULT LETTER
[Dear  ___] : Dear ~DYLLAN, [Consult Letter:] : I had the pleasure of evaluating your patient, [unfilled]. [Please see my note below.] : Please see my note below. [Consult Closing:] : Thank you very much for allowing me to participate in the care of this patient.  If you have any questions, please do not hesitate to contact me. [Sincerely,] : Sincerely, [FreeTextEntry3] : Michael Conte MD, MPH, FASGE\par Chief of Clinical Quality in Gastroenterology, [facility]\par Associate Chief of Gastroenterology, [facility]\par Director of Endoscopic Ultrasound, [facility]\par [address], [address]\par [place], [address]\par 24 hours [phone]\par \par

## 2021-10-25 ENCOUNTER — RX RENEWAL (OUTPATIENT)
Age: 51
End: 2021-10-25

## 2021-11-04 ENCOUNTER — FORM ENCOUNTER (OUTPATIENT)
Age: 51
End: 2021-11-04

## 2021-11-05 ENCOUNTER — APPOINTMENT (OUTPATIENT)
Dept: INFECTIOUS DISEASE | Facility: CLINIC | Age: 51
End: 2021-11-05
Payer: COMMERCIAL

## 2021-11-05 VITALS
WEIGHT: 240 LBS | BODY MASS INDEX: 29.84 KG/M2 | TEMPERATURE: 98.9 F | OXYGEN SATURATION: 97 % | SYSTOLIC BLOOD PRESSURE: 143 MMHG | HEIGHT: 75 IN | HEART RATE: 86 BPM | DIASTOLIC BLOOD PRESSURE: 87 MMHG

## 2021-11-05 LAB
APPEARANCE: CLEAR
BACTERIA: NEGATIVE
BASOPHILS # BLD AUTO: 0.03 K/UL
BASOPHILS NFR BLD AUTO: 0.7 %
BILIRUBIN URINE: NEGATIVE
BLOOD URINE: NEGATIVE
CD3 CELLS # BLD: 1478 /UL
CD3 CELLS NFR BLD: 71 %
CD3+CD4+ CELLS # BLD: 509 /UL
CD3+CD4+ CELLS NFR BLD: 24 %
CD3+CD4+ CELLS/CD3+CD8+ CLL SPEC: 0.56 RATIO
CD3+CD8+ CELLS # SPEC: 908 /UL
CD3+CD8+ CELLS NFR BLD: 43 %
COLOR: YELLOW
EOSINOPHIL # BLD AUTO: 0.08 K/UL
EOSINOPHIL NFR BLD AUTO: 1.9 %
GLUCOSE QUALITATIVE U: NEGATIVE
HCT VFR BLD CALC: 42 %
HGB BLD-MCNC: 14.1 G/DL
HYALINE CASTS: 1 /LPF
IMM GRANULOCYTES NFR BLD AUTO: 0.2 %
KETONES URINE: NEGATIVE
LEUKOCYTE ESTERASE URINE: NEGATIVE
LYMPHOCYTES # BLD AUTO: 2.25 K/UL
LYMPHOCYTES NFR BLD AUTO: 54.3 %
MAN DIFF?: NORMAL
MCHC RBC-ENTMCNC: 30.2 PG
MCHC RBC-ENTMCNC: 33.6 GM/DL
MCV RBC AUTO: 89.9 FL
MICROSCOPIC-UA: NORMAL
MONOCYTES # BLD AUTO: 0.34 K/UL
MONOCYTES NFR BLD AUTO: 8.2 %
NEUTROPHILS # BLD AUTO: 1.43 K/UL
NEUTROPHILS NFR BLD AUTO: 34.7 %
NITRITE URINE: NEGATIVE
PH URINE: 6.5
PLATELET # BLD AUTO: 259 K/UL
PROTEIN URINE: NORMAL
RBC # BLD: 4.67 M/UL
RBC # FLD: 12.8 %
RED BLOOD CELLS URINE: 1 /HPF
SPECIFIC GRAVITY URINE: 1.01
SQUAMOUS EPITHELIAL CELLS: 1 /HPF
UROBILINOGEN URINE: NORMAL
WBC # FLD AUTO: 4.14 K/UL
WHITE BLOOD CELLS URINE: 0 /HPF

## 2021-11-05 PROCEDURE — 90686 IIV4 VACC NO PRSV 0.5 ML IM: CPT

## 2021-11-05 PROCEDURE — G0008: CPT

## 2021-11-05 PROCEDURE — 99213 OFFICE O/P EST LOW 20 MIN: CPT

## 2021-11-05 NOTE — HISTORY OF PRESENT ILLNESS
[FreeTextEntry1] : 11/05/2021----VENU SANDOVAL is a 51 year old male being seen for a follow-up visit. Here for 4 month visit. .\par Diagnosed with AIDS in 2008. Now Cd4 over 500. Undetrectable VL. .\par recently went to dental 2020 and optho. \par went to ENT and pulmonology and Jordy Peraza for lipids and HTN. \par updated meds list 5/7/2021---Doing well on Genvoya, Amlodipine 5mg Pt also had recent stress test . Pt to continue the Lisinopril-Hydrochlorothiazide 20-12.5 MG Oral Tablet and Vit D3 as well. rosuvastatin 10 mg from Dr. Jordy Peraza and famotidine 40mg daily . \par No family Hx of Colon Ca. \par Seen by Dr. Craig and doing well. \par Had the colonoscopy , all well, has follow up. \par had pfizer covid vaccine\par plan 11/5/2021: \par 1) continue Genvoya and all meds\par 2) labs today see in 6 months and flu vaccine. \par \par \par Pt states familal history of Multiple myloma. \par \par Sexual History: The patient is sexually active. The patient has anal intercourse with men. He is currently sexually active with 0 male partner(s). 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)\par Hyperlipidemia, mild (272.4) (E78.5)\par Hypertension (401.9) (I10)\par Insomnia (780.52) (G47.00)\par Intermittent memory loss (780.93) (R41.3)\par Low testosterone (790.99) (R79.89)\par Need for Menactra vaccination (V03.89) (Z23)\par Neuropathy involving both lower extremities (356.9) (G57.93)\par CLARI (obstructive sleep apnea) (327.23) (G47.33)\par Osteoarthritis of both hips (715.95) (M16.0)\par Preop testing (V72.84) (Z01.818)\par Preop testing (V72.84) (Z01.818)\par Pre-procedural laboratory examination (V72.63) (Z01.812)\par Prostate cancer screening (V76.44) (Z12.5)\par Rash (782.1) (R21)\par Scoliosis (737.30) (M41.9)\par Shortness of breath on exertion (786.05) (R06.02)\par Sickle cell trait (282.5) (D57.3)\par Skin rash (782.1) (R21)\par Sleep apnea (780.57) (G47.30)\par Vitamin D deficiency (268.9) (E55.9)\par \par Past Medical History\par History of blood transfusion (V15.89) (Z92.89)\par History of candidiasis of mouth (V12.09) (Z86.19)\par History of complete eye exam (V15.89) (Z92.89)\par History of dental examination (V15.89) (Z92.89)\par History of gonorrhea (V12.09) (Z86.19)\par History of hemorrhoids (V13.89) (Z87.19)\par History of pneumocystis pneumonia (V12.61) (Z87.01)\par History of Hypophosphatemia (275.3) (E83.39)\par History of Perirectal ulcer (569.41) (K62.6)\par History of Walking pneumonia (486) (J18.9)\par \par Current Meds\par amLODIPine Besylate 5 MG Oral Tablet; TAKE ONE TABLET BY MOUTH DAILY\par Azelastine HCl - 0.1 % Nasal Solution; INHALE 2 SQUIRT Twice daily\par Diclofenac Sodium 75 MG Oral Tablet Delayed Release; Take 1 tablet twice daily\par Famotidine 40 MG Oral Tablet; TAKE 1 TABLET AT BEDTIME\par Genvoya [phone] MG Oral Tablet; TAKE 1 TABLET BY MOUTH DAILY\par Lisinopril-hydroCHLOROthiazide 20-12.5 MG Oral Tablet; TAKE ONE TABLET BY MOUTH\par DAILY\par Rosuvastatin Calcium 10 MG Oral Tablet; TAKE 1 TABLET DAILY\par Vitamin D (Ergocalciferol) 1.25 MG (12322 UT) Oral Capsule; TAKE 1 CAPSULE BY\par MOUTH EVERY 2 WEEKS\par \par Allergies\par No Known Drug Allergies\par Animal dander - Cats\par Mold\par

## 2021-11-08 LAB
25(OH)D3 SERPL-MCNC: 56.5 NG/ML
ALBUMIN SERPL ELPH-MCNC: 5.3 G/DL
ALP BLD-CCNC: 62 U/L
ALT SERPL-CCNC: 41 U/L
ANION GAP SERPL CALC-SCNC: 13 MMOL/L
AST SERPL-CCNC: 34 U/L
BILIRUB SERPL-MCNC: 0.5 MG/DL
BUN SERPL-MCNC: 7 MG/DL
CALCIUM SERPL-MCNC: 10.2 MG/DL
CHLORIDE SERPL-SCNC: 100 MMOL/L
CO2 SERPL-SCNC: 29 MMOL/L
CREAT SERPL-MCNC: 1.31 MG/DL
CYSTATIN C SERPL-MCNC: 1.08 MG/L
ESTIMATED AVERAGE GLUCOSE: 114 MG/DL
GFR/BSA.PRED SERPLBLD CYS-BASED-ARV: 73 ML/MIN
GLUCOSE SERPL-MCNC: 96 MG/DL
HBA1C MFR BLD HPLC: 5.6 %
HIV1 RNA # SERPL NAA+PROBE: NORMAL
HIV1 RNA # SERPL NAA+PROBE: NORMAL COPIES/ML
POTASSIUM SERPL-SCNC: 3.9 MMOL/L
PROT SERPL-MCNC: 8.2 G/DL
SODIUM SERPL-SCNC: 143 MMOL/L
VIRAL LOAD INTERP: NORMAL
VIRAL LOAD LOG: NORMAL LG COP/ML

## 2021-11-22 ENCOUNTER — RX RENEWAL (OUTPATIENT)
Age: 51
End: 2021-11-22

## 2021-11-26 ENCOUNTER — APPOINTMENT (OUTPATIENT)
Dept: INFECTIOUS DISEASE | Facility: CLINIC | Age: 51
End: 2021-11-26
Payer: COMMERCIAL

## 2021-11-26 PROCEDURE — 90750 HZV VACC RECOMBINANT IM: CPT

## 2021-11-26 PROCEDURE — 90471 IMMUNIZATION ADMIN: CPT

## 2021-12-02 ENCOUNTER — NON-APPOINTMENT (OUTPATIENT)
Age: 51
End: 2021-12-02

## 2021-12-08 ENCOUNTER — NON-APPOINTMENT (OUTPATIENT)
Age: 51
End: 2021-12-08

## 2021-12-08 ENCOUNTER — APPOINTMENT (OUTPATIENT)
Dept: OPHTHALMOLOGY | Facility: CLINIC | Age: 51
End: 2021-12-08
Payer: COMMERCIAL

## 2021-12-08 LAB
ANION GAP SERPL CALC-SCNC: 11 MMOL/L
BUN SERPL-MCNC: 8 MG/DL
CALCIUM SERPL-MCNC: 9.5 MG/DL
CHLORIDE SERPL-SCNC: 103 MMOL/L
CO2 SERPL-SCNC: 28 MMOL/L
CREAT SERPL-MCNC: 1.32 MG/DL
GLUCOSE SERPL-MCNC: 120 MG/DL
POTASSIUM SERPL-SCNC: 3.6 MMOL/L
SODIUM SERPL-SCNC: 142 MMOL/L

## 2021-12-08 PROCEDURE — 92014 COMPRE OPH EXAM EST PT 1/>: CPT

## 2021-12-08 PROCEDURE — 92250 FUNDUS PHOTOGRAPHY W/I&R: CPT

## 2021-12-15 ENCOUNTER — APPOINTMENT (OUTPATIENT)
Dept: CT IMAGING | Facility: CLINIC | Age: 51
End: 2021-12-15

## 2022-01-11 ENCOUNTER — NON-APPOINTMENT (OUTPATIENT)
Age: 52
End: 2022-01-11

## 2022-01-11 RX ORDER — AZELASTINE HYDROCHLORIDE 137 UG/1
0.1 SPRAY, METERED NASAL TWICE DAILY
Qty: 3 | Refills: 1 | Status: DISCONTINUED | COMMUNITY
Start: 2020-09-14 | End: 2022-01-11

## 2022-01-11 RX ORDER — UBIDECARENONE/VIT E ACET 100MG-5
50 MCG CAPSULE ORAL
Qty: 30 | Refills: 0 | Status: DISCONTINUED | COMMUNITY
Start: 2021-06-01 | End: 2022-01-11

## 2022-01-11 RX ORDER — FAMOTIDINE 40 MG/1
40 TABLET, FILM COATED ORAL
Qty: 90 | Refills: 1 | Status: DISCONTINUED | COMMUNITY
Start: 2020-07-21 | End: 2022-01-11

## 2022-01-11 RX ORDER — DICLOFENAC SODIUM 75 MG/1
75 TABLET, DELAYED RELEASE ORAL
Qty: 60 | Refills: 0 | Status: DISCONTINUED | COMMUNITY
Start: 2017-02-17 | End: 2022-01-11

## 2022-01-12 ENCOUNTER — NON-APPOINTMENT (OUTPATIENT)
Age: 52
End: 2022-01-12

## 2022-01-12 DIAGNOSIS — Z01.818 ENCOUNTER FOR OTHER PREPROCEDURAL EXAMINATION: ICD-10-CM

## 2022-01-12 DIAGNOSIS — Z01.812 ENCOUNTER FOR PREPROCEDURAL LABORATORY EXAMINATION: ICD-10-CM

## 2022-01-13 ENCOUNTER — APPOINTMENT (OUTPATIENT)
Dept: INFECTIOUS DISEASE | Facility: CLINIC | Age: 52
End: 2022-01-13
Payer: COMMERCIAL

## 2022-01-13 VITALS
HEIGHT: 75 IN | WEIGHT: 251 LBS | BODY MASS INDEX: 31.21 KG/M2 | RESPIRATION RATE: 16 BRPM | HEART RATE: 92 BPM | DIASTOLIC BLOOD PRESSURE: 87 MMHG | OXYGEN SATURATION: 98 % | TEMPERATURE: 98.7 F | SYSTOLIC BLOOD PRESSURE: 135 MMHG

## 2022-01-13 DIAGNOSIS — R73.09 OTHER ABNORMAL GLUCOSE: ICD-10-CM

## 2022-01-13 DIAGNOSIS — B36.0 PITYRIASIS VERSICOLOR: ICD-10-CM

## 2022-01-13 DIAGNOSIS — E55.9 VITAMIN D DEFICIENCY, UNSPECIFIED: ICD-10-CM

## 2022-01-13 PROCEDURE — 99202 OFFICE O/P NEW SF 15 MIN: CPT

## 2022-01-13 RX ORDER — FAMOTIDINE 40 MG/1
40 TABLET, FILM COATED ORAL
Refills: 0 | Status: ACTIVE | COMMUNITY
Start: 2022-01-13

## 2022-01-13 NOTE — REVIEW OF SYSTEMS
[Skin Rash] : skin rash [Fever] : no fever [Chills] : no chills [Fatigue] : no fatigue [Chest Pain] : no chest pain [Palpitations] : no palpitations [Orthopena] : no orthopnea [Paroxysmal Nocturnal Dyspnea] : no paroxysmal nocturnal dyspnea [Shortness Of Breath] : no shortness of breath [Cough] : no cough [Abdominal Pain] : no abdominal pain [Nausea] : no nausea [Diarrhea] : no diarrhea [Vomiting] : no vomiting [Headache] : no headache

## 2022-01-13 NOTE — PHYSICAL EXAM
[No Acute Distress] : no acute distress [Well Nourished] : well nourished [Normal Sclera/Conjunctiva] : normal sclera/conjunctiva [No Respiratory Distress] : no respiratory distress  [Clear to Auscultation] : lungs were clear to auscultation bilaterally [Normal Rate] : normal rate  [Regular Rhythm] : with a regular rhythm [No Edema] : there was no peripheral edema [Soft] : abdomen soft [Non Tender] : non-tender [Non-distended] : non-distended [Coordination Grossly Intact] : coordination grossly intact [No Focal Deficits] : no focal deficits [Speech Grossly Normal] : speech grossly normal [Normal Affect] : the affect was normal [Normal Insight/Judgement] : insight and judgment were intact [de-identified] : quarter sized macular, hyperpigmented lesions on left arm, anterior chest and along lower throacic spine. No erythema or scaling

## 2022-01-13 NOTE — ASSESSMENT
[FreeTextEntry1] : 51 y.o. male here to establish with PCP and for chronic disease f/u\par \par HTN\par - On amlodipine 5 mg, Lisinopril/ HCTZ 20/12.5. BP @ goal < 140/90\par - Encouraged to check 1-2 times per week and call if consistently > 140/90. Can adjust med dosage at that time if needed\par \par HLD\par - on rosuvastatin and Zetia. Continue. managed by cardiology\par \par OA\par - Advised continued activity, discussed diet and exercise to help with weight control to help with symptoms\par - F/u with orthopedics as scheduled and can discuss with PT if needed\par \par Prediabetes\par -  A1c 5.7 in May 2021, 5.6 in Nov 2021. Doesn't desire any meds at this time. Agree and advised lifestyle changes. Will check A1c every 3-6 months to monitor\par \par Tinea Versicolor\par - Suspect rash is tinea versicolor although DDX included pityriasis rosea\par - Advised use of selsun blue shampoo topically X 2 weeks. If no relief, can try other topical antifungals. Pt will call back if symptoms persist\par \par HIV\par - On Genvoya per ID. Reports good tolerance and compliance\par \par CLARI- sees Pulm, failed CPAP. recently started on BIpap and working well. Discussed positive effects of controlled CLARI on HTN as well\par \par VIT D def- hx \par - currently wnl, continue daily maintenance Vit D\par \par CAD\par -      Family Hx of early CAD,  small vessel disease per cardiology- has f/u scheduled with them to discuss further evaluation strategies\par - Don any symptoms at this time\par - Recommend HTN, HLD control and diet and lifestyle management\par -      On Rosuvastatin 10 mg , Zetia\par \par GERD\par -      PRN H2 blocker\par  \par USPSTF\par -   Smoking history doesn’t meet criteria for lung cancer screening\par -     Colon cancer screening- had colonoscopy, CT colonography recommended and is scheduled\par -      A1c- prediabetes borderline, continue to monitor in the future\par \par F/u in 3-6 months or sooner if needed

## 2022-01-13 NOTE — HISTORY OF PRESENT ILLNESS
[FreeTextEntry1] : 51 y.o. male here to establish care with me as PCP and for chronic disease f/u [de-identified] : 51 y.o. male here to establish care with me as PCP and for chronic disease f/u \par  \par - Currently works from home and that has limited his exercise along with the OA\par  \par Specialists\par GI- Dr Michael Conte- colon cancer screening and GERD. Colonoscopy- 7/2021- incomplete due to colon looping. Recommended CT colonography- scheduled for next week\par \par Cardiology- Dr Jordy Brizuela\par - small vessel CAD, ST depression on stress EKG in 6/2020- held off on cath, NSVT – plan for ziopatch in future- suspected related to CLARI\par \par Osteoarthitis\par - diagnosed by orthopedics. Mostly in his hips and back. Does have a history of mild scoliosis as well. Scheduled to f/u with them soon\par - Used to be able to walk 2 miles around home in the past but unable to do so currently due to hip pain\par \par CLARI\par - picked up biPAP in Dec and using it and noticing a difference in energy levels\par \par Prediabetes\par - wants to try lifestyle changes \par \par HTN\par - BP stable, doesn’t check much at home but has a wrist monitor. On amlodipine 5 mg, Lisinopril/ HCTZ 20/12.5 mg\par \par Rash\par - Has these spots on his chest and arm for a few months. Was given some selsun blue shampoo for it which he used a couple of times without much relief. Rash doesn't itch or hasn't grown since it was first noticed. No scaling

## 2022-01-17 ENCOUNTER — OUTPATIENT (OUTPATIENT)
Dept: OUTPATIENT SERVICES | Facility: HOSPITAL | Age: 52
LOS: 1 days | End: 2022-01-17
Payer: COMMERCIAL

## 2022-01-17 ENCOUNTER — APPOINTMENT (OUTPATIENT)
Dept: CT IMAGING | Facility: CLINIC | Age: 52
End: 2022-01-17
Payer: COMMERCIAL

## 2022-01-17 DIAGNOSIS — Z12.11 ENCOUNTER FOR SCREENING FOR MALIGNANT NEOPLASM OF COLON: ICD-10-CM

## 2022-01-17 DIAGNOSIS — Z00.8 ENCOUNTER FOR OTHER GENERAL EXAMINATION: ICD-10-CM

## 2022-01-17 DIAGNOSIS — M20.41 OTHER HAMMER TOE(S) (ACQUIRED), RIGHT FOOT: Chronic | ICD-10-CM

## 2022-01-17 PROCEDURE — 74261 CT COLONOGRAPHY DX: CPT | Mod: 26

## 2022-01-19 ENCOUNTER — APPOINTMENT (OUTPATIENT)
Dept: ORTHOPEDIC SURGERY | Facility: CLINIC | Age: 52
End: 2022-01-19
Payer: COMMERCIAL

## 2022-01-19 VITALS — WEIGHT: 251 LBS | HEIGHT: 75 IN | BODY MASS INDEX: 31.21 KG/M2

## 2022-01-19 DIAGNOSIS — M54.50 LOW BACK PAIN, UNSPECIFIED: ICD-10-CM

## 2022-01-19 DIAGNOSIS — G95.19 OTHER VASCULAR MYELOPATHIES: ICD-10-CM

## 2022-01-19 PROCEDURE — 72100 X-RAY EXAM L-S SPINE 2/3 VWS: CPT

## 2022-01-19 PROCEDURE — 99204 OFFICE O/P NEW MOD 45 MIN: CPT

## 2022-01-28 ENCOUNTER — APPOINTMENT (OUTPATIENT)
Dept: INFECTIOUS DISEASE | Facility: CLINIC | Age: 52
End: 2022-01-28
Payer: COMMERCIAL

## 2022-01-28 ENCOUNTER — MED ADMIN CHARGE (OUTPATIENT)
Age: 52
End: 2022-01-28

## 2022-01-28 LAB
ALBUMIN SERPL ELPH-MCNC: 5.1 G/DL — HIGH (ref 3.3–5)
ALP SERPL-CCNC: 56 U/L — SIGNIFICANT CHANGE UP (ref 40–120)
ALT FLD-CCNC: 53 U/L — HIGH (ref 10–45)
ANION GAP SERPL CALC-SCNC: 12 MMOL/L — SIGNIFICANT CHANGE UP (ref 5–17)
AST SERPL-CCNC: 41 U/L — HIGH (ref 10–40)
BILIRUB SERPL-MCNC: 0.4 MG/DL — SIGNIFICANT CHANGE UP (ref 0.2–1.2)
BUN SERPL-MCNC: 11 MG/DL — SIGNIFICANT CHANGE UP (ref 7–23)
CALCIUM SERPL-MCNC: 9.9 MG/DL — SIGNIFICANT CHANGE UP (ref 8.4–10.5)
CHLORIDE SERPL-SCNC: 105 MMOL/L — SIGNIFICANT CHANGE UP (ref 96–108)
CHOLEST SERPL-MCNC: 191 MG/DL — SIGNIFICANT CHANGE UP
CO2 SERPL-SCNC: 27 MMOL/L — SIGNIFICANT CHANGE UP (ref 22–31)
CREAT SERPL-MCNC: 1.37 MG/DL — HIGH (ref 0.5–1.3)
GLUCOSE SERPL-MCNC: 110 MG/DL — HIGH (ref 70–99)
HDLC SERPL-MCNC: 48 MG/DL — SIGNIFICANT CHANGE UP
LIPID PNL WITH DIRECT LDL SERPL: 112 MG/DL — HIGH
NON HDL CHOLESTEROL: 143 MG/DL — HIGH
POTASSIUM SERPL-MCNC: 4 MMOL/L — SIGNIFICANT CHANGE UP (ref 3.5–5.3)
POTASSIUM SERPL-SCNC: 4 MMOL/L — SIGNIFICANT CHANGE UP (ref 3.5–5.3)
PROT SERPL-MCNC: 7.8 G/DL — SIGNIFICANT CHANGE UP (ref 6–8.3)
SODIUM SERPL-SCNC: 145 MMOL/L — SIGNIFICANT CHANGE UP (ref 135–145)
TRIGL SERPL-MCNC: 159 MG/DL — HIGH

## 2022-01-28 PROCEDURE — 74261 CT COLONOGRAPHY DX: CPT

## 2022-01-28 PROCEDURE — 90750 HZV VACC RECOMBINANT IM: CPT

## 2022-01-28 PROCEDURE — 90471 IMMUNIZATION ADMIN: CPT

## 2022-01-28 PROCEDURE — 80061 LIPID PANEL: CPT

## 2022-01-28 PROCEDURE — 80053 COMPREHEN METABOLIC PANEL: CPT

## 2022-02-01 ENCOUNTER — OUTPATIENT (OUTPATIENT)
Dept: OUTPATIENT SERVICES | Facility: HOSPITAL | Age: 52
LOS: 1 days | End: 2022-02-01
Payer: COMMERCIAL

## 2022-02-01 ENCOUNTER — APPOINTMENT (OUTPATIENT)
Dept: MRI IMAGING | Facility: CLINIC | Age: 52
End: 2022-02-01
Payer: COMMERCIAL

## 2022-02-01 DIAGNOSIS — M20.41 OTHER HAMMER TOE(S) (ACQUIRED), RIGHT FOOT: Chronic | ICD-10-CM

## 2022-02-01 DIAGNOSIS — G95.19 OTHER VASCULAR MYELOPATHIES: ICD-10-CM

## 2022-02-01 PROCEDURE — 72148 MRI LUMBAR SPINE W/O DYE: CPT | Mod: 26

## 2022-02-01 PROCEDURE — 72148 MRI LUMBAR SPINE W/O DYE: CPT

## 2022-02-11 ENCOUNTER — APPOINTMENT (OUTPATIENT)
Dept: ORTHOPEDIC SURGERY | Facility: CLINIC | Age: 52
End: 2022-02-11
Payer: COMMERCIAL

## 2022-02-11 VITALS
HEART RATE: 103 BPM | BODY MASS INDEX: 31.21 KG/M2 | DIASTOLIC BLOOD PRESSURE: 84 MMHG | WEIGHT: 251 LBS | SYSTOLIC BLOOD PRESSURE: 160 MMHG | HEIGHT: 75 IN

## 2022-02-11 DIAGNOSIS — M16.0 BILATERAL PRIMARY OSTEOARTHRITIS OF HIP: ICD-10-CM

## 2022-02-11 PROCEDURE — 99215 OFFICE O/P EST HI 40 MIN: CPT

## 2022-02-11 PROCEDURE — 73521 X-RAY EXAM HIPS BI 2 VIEWS: CPT

## 2022-02-15 ENCOUNTER — RX RENEWAL (OUTPATIENT)
Age: 52
End: 2022-02-15

## 2022-02-18 ENCOUNTER — APPOINTMENT (OUTPATIENT)
Dept: ORTHOPEDIC SURGERY | Facility: CLINIC | Age: 52
End: 2022-02-18
Payer: COMMERCIAL

## 2022-02-18 VITALS — BODY MASS INDEX: 31.21 KG/M2 | WEIGHT: 251 LBS | HEIGHT: 75 IN

## 2022-02-18 PROCEDURE — 99214 OFFICE O/P EST MOD 30 MIN: CPT

## 2022-02-18 NOTE — DISCUSSION/SUMMARY
[de-identified] : 52 yo male with lumbar DDD, recommend PT, NSAIDS, pain consult, RTO 3 months. \par \par  Diagnosis, prognosis, natural history and treatment was discussed with patient. Patient was advised if the following symptoms develop: chills, fever, \par loss of bladder control, bowel incontinence or urinary retention, numbness/tingling or weakness is present in upper or lower extremities, to go to the nearest emergency room. This may be a new clinical condition not present at the time of the patient visit  that may lead to paralysis and/or death, Patient advised if the above symptoms developed to also call the office immediately to inform us and to go to the nearest emergency room.\par

## 2022-02-18 NOTE — HISTORY OF PRESENT ILLNESS
[de-identified] : Pt with chronic low back pain since 2008. He presents with c/o intermittent low back pain progressively worsening over the past 2 years. Pain is described as dull/achy/shooting pain. Pain radiate to right buttock, right hamstring, mild weakness on RLE. Pt denies numbness,tingling, on B/L LE. Pt takes Tylenol, Bioflex, these provides some pain relief. Pt denies having JESSICA in the past, does not participate with PT at this time. He did not take his Meloxicam or do his PT from last office visit. He returns to office today for review of MRI results. Pt denies fever, chills, weight changes, loss of bladder control, bowel incontinence or urinary retention or saddle anesthesia

## 2022-02-18 NOTE — PHYSICAL EXAM
[UE/LE] : Sensory: Intact in bilateral upper & lower extremities [ALL] : dorsalis pedis, posterior tibial, femoral, popliteal, and radial 2+ and symmetric bilaterally [Normal] : Oriented to person, place, and time, insight and judgement were intact and the affect was normal [Lee's Sign] : negative Lee's sign [Pronator Drift] : negative pronator drift [SLR] : negative straight leg raise [de-identified] : Lumbar ROM: Limited, painful\par Mild TTP b/l paraspinals L region. \par NTTP L spine and \par Skin intact L spine. No rashes, ulcers, blisters. \par No lymphedema. \par Rapid alternating movements- Intact. \par Full and non-painful ROM RUE, LUE, RLE, and LLE. Skin intact RUE, LUE, RLE, and LLE. No rashes, blisters, ulcers. NTTP RUE, LUE, RLE, and LLE. No evidence of dislocation or subluxation B/L.\par  [de-identified] : EXAM: MR SPINE LUMBAR\par \par PROCEDURE DATE: 02/01/2022\par \par INTERPRETATION: EXAMINATION: MR LUMBAR SPINE\par \par CLINICAL INDICATION: Low back muscle strain, with pain in hips and lower back\par \par TECHNIQUE: Multi-planar, multi-sequence MR of the lumbar spine was performed without intravenous contrast.\par \par COMPARISON: Correlation with abdominopelvic CT dated 1/7/2022. No prior dedicated lumbar spine imaging available for direct comparison.\par \par INTERPRETATION:\par \par BONES AND BONE MARROW: Mild endplate discogenic edema at T12-L1 and L1-L2. There are prominent anterior osteophytes at L1-L2.\par INTERVERTEBRAL DISCS: The intervertebral disc signal and disc heights are normal.\par ALIGNMENT: The spinal alignment is maintained.\par CONUS AND CAUDA EQUINA: The conus medullaris is normal in size, signal and location. The conus terminates at the L1 level.\par EXTRASPINAL: There is no epidural mass or fluid collection. The paraspinal and included extraspinal soft tissues are unremarkable.\par \par Evaluation of individual lumbar disc space levels demonstrates the following:\par \par L1/L2, There is no significant spinal canal or neural foraminal stenosis.\par L2/L3, There is no significant spinal canal or neural foraminal stenosis.\par L3/L4, broad-based posterior disc herniation and moderate facet arthropathy. Mild bilateral neural foraminal stenosis. No central stenosis.\par L4/L5, broad-based posterior disc herniation and moderate facet arthropathy. Mild bilateral neural foraminal stenosis. No central stenosis.\par L5/S1, There is no significant spinal canal or neural foraminal stenosis.\par \par IMPRESSION:\par Mild lumbar spondylosis as above, at L3-4 and L4-5.\par \par SHLOMIT A GOLDBERG-STEIN MD; Attending Radiologist\par This document has been electronically signed. Feb 1 2022 5:03PM\par \par \par \par 2 views AP and Lat of LS Spine from [facility] 01/19/22. Read and dictated by Dr. Puma Vazquez Board Certified orthopaedic surgeon Lumbar Spondylosis\par \par \par

## 2022-03-09 ENCOUNTER — NON-APPOINTMENT (OUTPATIENT)
Age: 52
End: 2022-03-09

## 2022-03-09 ENCOUNTER — APPOINTMENT (OUTPATIENT)
Dept: CARDIOLOGY | Facility: CLINIC | Age: 52
End: 2022-03-09
Payer: COMMERCIAL

## 2022-03-09 VITALS
BODY MASS INDEX: 31.08 KG/M2 | HEART RATE: 88 BPM | OXYGEN SATURATION: 99 % | DIASTOLIC BLOOD PRESSURE: 90 MMHG | WEIGHT: 250 LBS | HEIGHT: 75 IN | SYSTOLIC BLOOD PRESSURE: 143 MMHG

## 2022-03-09 LAB
ALBUMIN SERPL ELPH-MCNC: 5.1 G/DL
ALP BLD-CCNC: 59 U/L
ALT SERPL-CCNC: 47 U/L
ANION GAP SERPL CALC-SCNC: 12 MMOL/L
AST SERPL-CCNC: 39 U/L
BILIRUB SERPL-MCNC: 0.4 MG/DL
BUN SERPL-MCNC: 7 MG/DL
CALCIUM SERPL-MCNC: 10.2 MG/DL
CHLORIDE SERPL-SCNC: 103 MMOL/L
CHOLEST SERPL-MCNC: 183 MG/DL
CO2 SERPL-SCNC: 27 MMOL/L
CREAT SERPL-MCNC: 1.32 MG/DL
EGFR: 65 ML/MIN/1.73M2
GLUCOSE SERPL-MCNC: 108 MG/DL
HDLC SERPL-MCNC: 49 MG/DL
LDLC SERPL CALC-MCNC: 96 MG/DL
NONHDLC SERPL-MCNC: 134 MG/DL
POTASSIUM SERPL-SCNC: 3.8 MMOL/L
PROT SERPL-MCNC: 7.9 G/DL
SODIUM SERPL-SCNC: 143 MMOL/L
TRIGL SERPL-MCNC: 190 MG/DL
TSH SERPL-ACNC: 1.67 UIU/ML

## 2022-03-09 PROCEDURE — 99402 PREV MED CNSL INDIV APPRX 30: CPT

## 2022-03-09 PROCEDURE — 93000 ELECTROCARDIOGRAM COMPLETE: CPT

## 2022-03-09 PROCEDURE — 99215 OFFICE O/P EST HI 40 MIN: CPT | Mod: 25

## 2022-03-10 LAB
BASOPHILS # BLD AUTO: 0.03 K/UL
BASOPHILS NFR BLD AUTO: 0.8 %
EOSINOPHIL # BLD AUTO: 0.06 K/UL
EOSINOPHIL NFR BLD AUTO: 1.6 %
ESTIMATED AVERAGE GLUCOSE: 114 MG/DL
HBA1C MFR BLD HPLC: 5.6 %
HCT VFR BLD CALC: 37.3 %
HGB BLD-MCNC: 12.7 G/DL
IMM GRANULOCYTES NFR BLD AUTO: 0 %
LYMPHOCYTES # BLD AUTO: 1.96 K/UL
LYMPHOCYTES NFR BLD AUTO: 52.1 %
MAN DIFF?: NORMAL
MCHC RBC-ENTMCNC: 29.3 PG
MCHC RBC-ENTMCNC: 34 GM/DL
MCV RBC AUTO: 86.1 FL
MONOCYTES # BLD AUTO: 0.39 K/UL
MONOCYTES NFR BLD AUTO: 10.4 %
NEUTROPHILS # BLD AUTO: 1.32 K/UL
NEUTROPHILS NFR BLD AUTO: 35.1 %
PLATELET # BLD AUTO: 288 K/UL
RBC # BLD: 4.33 M/UL
RBC # FLD: 12.7 %
WBC # FLD AUTO: 3.76 K/UL

## 2022-03-14 ENCOUNTER — RX RENEWAL (OUTPATIENT)
Age: 52
End: 2022-03-14

## 2022-03-16 ENCOUNTER — RX RENEWAL (OUTPATIENT)
Age: 52
End: 2022-03-16

## 2022-04-01 RX ORDER — MELOXICAM 15 MG/1
15 TABLET ORAL
Qty: 30 | Refills: 1 | Status: ACTIVE | COMMUNITY
Start: 2022-01-19 | End: 1900-01-01

## 2022-04-11 ENCOUNTER — RX RENEWAL (OUTPATIENT)
Age: 52
End: 2022-04-11

## 2022-04-14 ENCOUNTER — APPOINTMENT (OUTPATIENT)
Dept: GASTROENTEROLOGY | Facility: CLINIC | Age: 52
End: 2022-04-14

## 2022-05-05 ENCOUNTER — FORM ENCOUNTER (OUTPATIENT)
Age: 52
End: 2022-05-05

## 2022-05-06 ENCOUNTER — APPOINTMENT (OUTPATIENT)
Dept: INFECTIOUS DISEASE | Facility: CLINIC | Age: 52
End: 2022-05-06
Payer: COMMERCIAL

## 2022-05-06 VITALS
OXYGEN SATURATION: 97 % | HEIGHT: 75 IN | SYSTOLIC BLOOD PRESSURE: 134 MMHG | HEART RATE: 77 BPM | TEMPERATURE: 97.4 F | DIASTOLIC BLOOD PRESSURE: 82 MMHG | WEIGHT: 248 LBS | BODY MASS INDEX: 30.84 KG/M2

## 2022-05-06 LAB
25(OH)D3 SERPL-MCNC: 51.1 NG/ML
ALBUMIN SERPL ELPH-MCNC: 5.2 G/DL
ALP BLD-CCNC: 62 U/L
ALT SERPL-CCNC: 39 U/L
ANION GAP SERPL CALC-SCNC: 15 MMOL/L
AST SERPL-CCNC: 35 U/L
BASOPHILS # BLD AUTO: 0.02 K/UL
BASOPHILS NFR BLD AUTO: 0.4 %
BILIRUB SERPL-MCNC: 0.4 MG/DL
BUN SERPL-MCNC: 10 MG/DL
CALCIUM SERPL-MCNC: 9.9 MG/DL
CHLORIDE SERPL-SCNC: 101 MMOL/L
CHOLEST SERPL-MCNC: 179 MG/DL
CO2 SERPL-SCNC: 26 MMOL/L
CREAT SERPL-MCNC: 1.51 MG/DL
CYSTATIN C SERPL-MCNC: 1.18 MG/L
EGFR: 56 ML/MIN/1.73M2
EOSINOPHIL # BLD AUTO: 0.06 K/UL
EOSINOPHIL NFR BLD AUTO: 1.3 %
GFR/BSA.PRED SERPLBLD CYS-BASED-ARV: 65 ML/MIN/1.73M2
GLUCOSE SERPL-MCNC: 102 MG/DL
HCT VFR BLD CALC: 39.5 %
HDLC SERPL-MCNC: 43 MG/DL
HGB BLD-MCNC: 13.2 G/DL
IMM GRANULOCYTES NFR BLD AUTO: 0.2 %
LDLC SERPL CALC-MCNC: 98 MG/DL
LYMPHOCYTES # BLD AUTO: 2.26 K/UL
LYMPHOCYTES NFR BLD AUTO: 49.3 %
MAN DIFF?: NORMAL
MCHC RBC-ENTMCNC: 28.8 PG
MCHC RBC-ENTMCNC: 33.4 GM/DL
MCV RBC AUTO: 86.2 FL
MONOCYTES # BLD AUTO: 0.46 K/UL
MONOCYTES NFR BLD AUTO: 10 %
NEUTROPHILS # BLD AUTO: 1.77 K/UL
NEUTROPHILS NFR BLD AUTO: 38.8 %
NONHDLC SERPL-MCNC: 135 MG/DL
PLATELET # BLD AUTO: 270 K/UL
POTASSIUM SERPL-SCNC: 4 MMOL/L
PROT SERPL-MCNC: 7.8 G/DL
PSA SERPL-MCNC: 2.73 NG/ML
RBC # BLD: 4.58 M/UL
RBC # FLD: 13.2 %
SODIUM SERPL-SCNC: 142 MMOL/L
TRIGL SERPL-MCNC: 185 MG/DL
WBC # FLD AUTO: 4.58 K/UL

## 2022-05-06 PROCEDURE — 99214 OFFICE O/P EST MOD 30 MIN: CPT

## 2022-05-06 NOTE — HISTORY OF PRESENT ILLNESS
[FreeTextEntry1] : 5/6/22----VENU SANDOVAL is a 51 year old male being seen for a follow-up visit. Here for 6 month visit. .\par Diagnosed with AIDS in 2008. Now Cd4 over 500. Undetrectable VL. .\par recently went to dental 2020 and optho. \par went to ENT and pulmonology and Jordy Peraza for lipids and HTN. \par updated meds list 5/7/2021---Doing well on Genvoya, Amlodipine 5mg Pt also had recent stress test . Pt to continue the Lisinopril-Hydrochlorothiazide 20-12.5 MG Oral Tablet and Vit D3 as well. rosuvastatin 10 mg from Dr. Jordy Peraza and famotidine 40mg daily . \par No family Hx of Colon Ca. \par Seen by Dr. Craig and doing well. \par Had the colonoscopy , all well, has follow up. \par had pfizer covid vaccine\par plan 5/6/22: \par 1) continue Genvoya and all meds\par 2) labs today see in 6 months\par \par \par Pt states familal history of Multiple myloma. \par \par Sexual History: The patient is sexually active. The patient has anal intercourse with men. He is currently sexually active with 0 male partner(s). 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)\par Hyperlipidemia, mild (272.4) (E78.5)\par Hypertension (401.9) (I10)\par Insomnia (780.52) (G47.00)\par Intermittent memory loss (780.93) (R41.3)\par Low testosterone (790.99) (R79.89)\par Need for Menactra vaccination (V03.89) (Z23)\par Neuropathy involving both lower extremities (356.9) (G57.93)\par CLARI (obstructive sleep apnea) (327.23) (G47.33)\par Osteoarthritis of both hips (715.95) (M16.0)\par Preop testing (V72.84) (Z01.818)\par Preop testing (V72.84) (Z01.818)\par Pre-procedural laboratory examination (V72.63) (Z01.812)\par Prostate cancer screening (V76.44) (Z12.5)\par Rash (782.1) (R21)\par Scoliosis (737.30) (M41.9)\par Shortness of breath on exertion (786.05) (R06.02)\par Sickle cell trait (282.5) (D57.3)\par Skin rash (782.1) (R21)\par Sleep apnea (780.57) (G47.30)\par Vitamin D deficiency (268.9) (E55.9)\par \par Past Medical History\par History of blood transfusion (V15.89) (Z92.89)\par History of candidiasis of mouth (V12.09) (Z86.19)\par History of complete eye exam (V15.89) (Z92.89)\par History of dental examination (V15.89) (Z92.89)\par History of gonorrhea (V12.09) (Z86.19)\par History of hemorrhoids (V13.89) (Z87.19)\par History of pneumocystis pneumonia (V12.61) (Z87.01)\par History of Hypophosphatemia (275.3) (E83.39)\par History of Perirectal ulcer (569.41) (K62.6)\par History of Walking pneumonia (486) (J18.9)\par \par Current Meds\par amLODIPine Besylate 5 MG Oral Tablet; TAKE ONE TABLET BY MOUTH DAILY\par Azelastine HCl - 0.1 % Nasal Solution; INHALE 2 SQUIRT Twice daily\par Diclofenac Sodium 75 MG Oral Tablet Delayed Release; Take 1 tablet twice daily\par Famotidine 40 MG Oral Tablet; TAKE 1 TABLET AT BEDTIME\par Genvoya [phone] MG Oral Tablet; TAKE 1 TABLET BY MOUTH DAILY\par Lisinopril-hydroCHLOROthiazide 20-12.5 MG Oral Tablet; TAKE ONE TABLET BY MOUTH\par DAILY\par Rosuvastatin Calcium 10 MG Oral Tablet; TAKE 1 TABLET DAILY\par Vitamin D (Ergocalciferol) 1.25 MG (08527 UT) Oral Capsule; TAKE 1 CAPSULE BY\par MOUTH EVERY 2 WEEKS\par \par Allergies\par No Known Drug Allergies\par Animal dander - Cats\par Mold\par

## 2022-05-09 LAB
ALDOSTERONE SERUM: 19.1 NG/DL
APPEARANCE: CLEAR
BACTERIA: NEGATIVE
BILIRUBIN URINE: NEGATIVE
BLOOD URINE: NEGATIVE
C TRACH RRNA SPEC QL NAA+PROBE: NOT DETECTED
C TRACH RRNA SPEC QL NAA+PROBE: NOT DETECTED
CD3 CELLS # BLD: 1636 /UL
CD3 CELLS NFR BLD: 70 %
CD3+CD4+ CELLS # BLD: 521 /UL
CD3+CD4+ CELLS NFR BLD: 22 %
CD3+CD4+ CELLS/CD3+CD8+ CLL SPEC: 0.5 RATIO
CD3+CD8+ CELLS # SPEC: 1044 /UL
CD3+CD8+ CELLS NFR BLD: 45 %
COLOR: YELLOW
ESTIMATED AVERAGE GLUCOSE: 117 MG/DL
GLUCOSE QUALITATIVE U: NEGATIVE
HBA1C MFR BLD HPLC: 5.7 %
HIV1 RNA # SERPL NAA+PROBE: NORMAL
HIV1 RNA # SERPL NAA+PROBE: NORMAL COPIES/ML
HYALINE CASTS: 2 /LPF
KETONES URINE: NEGATIVE
LEUKOCYTE ESTERASE URINE: NEGATIVE
MICROSCOPIC-UA: NORMAL
N GONORRHOEA RRNA SPEC QL NAA+PROBE: NOT DETECTED
N GONORRHOEA RRNA SPEC QL NAA+PROBE: NOT DETECTED
NITRITE URINE: NEGATIVE
PH URINE: 6
PROTEIN URINE: NORMAL
RED BLOOD CELLS URINE: 2 /HPF
RENIN PLASMA: 19.5 PG/ML
SOURCE AMPLIFICATION: NORMAL
SOURCE ORAL: NORMAL
SPECIFIC GRAVITY URINE: 1.02
SQUAMOUS EPITHELIAL CELLS: 1 /HPF
T PALLIDUM AB SER QL IA: NEGATIVE
UROBILINOGEN URINE: NORMAL
VIRAL LOAD INTERP: NORMAL
VIRAL LOAD LOG: NORMAL LG COP/ML
WHITE BLOOD CELLS URINE: 1 /HPF

## 2022-06-13 ENCOUNTER — RX RENEWAL (OUTPATIENT)
Age: 52
End: 2022-06-13

## 2022-07-07 ENCOUNTER — APPOINTMENT (OUTPATIENT)
Dept: INFECTIOUS DISEASE | Facility: CLINIC | Age: 52
End: 2022-07-07

## 2022-08-09 ENCOUNTER — RX RENEWAL (OUTPATIENT)
Age: 52
End: 2022-08-09

## 2022-08-12 ENCOUNTER — APPOINTMENT (OUTPATIENT)
Dept: ORTHOPEDIC SURGERY | Facility: CLINIC | Age: 52
End: 2022-08-12

## 2022-09-07 ENCOUNTER — NON-APPOINTMENT (OUTPATIENT)
Age: 52
End: 2022-09-07

## 2022-09-07 ENCOUNTER — APPOINTMENT (OUTPATIENT)
Dept: CARDIOLOGY | Facility: CLINIC | Age: 52
End: 2022-09-07

## 2022-09-07 VITALS
SYSTOLIC BLOOD PRESSURE: 145 MMHG | HEIGHT: 75 IN | WEIGHT: 248 LBS | HEART RATE: 79 BPM | OXYGEN SATURATION: 100 % | DIASTOLIC BLOOD PRESSURE: 89 MMHG | BODY MASS INDEX: 30.84 KG/M2

## 2022-09-07 PROCEDURE — 99402 PREV MED CNSL INDIV APPRX 30: CPT

## 2022-09-07 PROCEDURE — 99215 OFFICE O/P EST HI 40 MIN: CPT | Mod: 25

## 2022-09-07 PROCEDURE — 93000 ELECTROCARDIOGRAM COMPLETE: CPT

## 2022-09-08 LAB
ALBUMIN SERPL ELPH-MCNC: 5.1 G/DL
ALP BLD-CCNC: 63 U/L
ALT SERPL-CCNC: 35 U/L
ANION GAP SERPL CALC-SCNC: 15 MMOL/L
AST SERPL-CCNC: 40 U/L
BASOPHILS # BLD AUTO: 0.03 K/UL
BASOPHILS NFR BLD AUTO: 0.7 %
BILIRUB SERPL-MCNC: 0.5 MG/DL
BUN SERPL-MCNC: 12 MG/DL
CALCIUM SERPL-MCNC: 9.9 MG/DL
CHLORIDE SERPL-SCNC: 103 MMOL/L
CHOLEST SERPL-MCNC: 178 MG/DL
CO2 SERPL-SCNC: 26 MMOL/L
CREAT SERPL-MCNC: 1.47 MG/DL
EGFR: 57 ML/MIN/1.73M2
EOSINOPHIL # BLD AUTO: 0.07 K/UL
EOSINOPHIL NFR BLD AUTO: 1.7 %
ESTIMATED AVERAGE GLUCOSE: 117 MG/DL
GLUCOSE SERPL-MCNC: 104 MG/DL
HBA1C MFR BLD HPLC: 5.7 %
HCT VFR BLD CALC: 38.9 %
HDLC SERPL-MCNC: 46 MG/DL
HGB BLD-MCNC: 12.8 G/DL
IMM GRANULOCYTES NFR BLD AUTO: 0 %
LDLC SERPL CALC-MCNC: 98 MG/DL
LYMPHOCYTES # BLD AUTO: 2.29 K/UL
LYMPHOCYTES NFR BLD AUTO: 55 %
MAN DIFF?: NORMAL
MCHC RBC-ENTMCNC: 28.8 PG
MCHC RBC-ENTMCNC: 32.9 GM/DL
MCV RBC AUTO: 87.4 FL
MONOCYTES # BLD AUTO: 0.33 K/UL
MONOCYTES NFR BLD AUTO: 7.9 %
NEUTROPHILS # BLD AUTO: 1.44 K/UL
NEUTROPHILS NFR BLD AUTO: 34.7 %
NONHDLC SERPL-MCNC: 132 MG/DL
NT-PROBNP SERPL-MCNC: 20 PG/ML
PLATELET # BLD AUTO: 235 K/UL
POTASSIUM SERPL-SCNC: 3.8 MMOL/L
PROT SERPL-MCNC: 7.9 G/DL
RBC # BLD: 4.45 M/UL
RBC # FLD: 13.4 %
SODIUM SERPL-SCNC: 144 MMOL/L
TRIGL SERPL-MCNC: 171 MG/DL
TSH SERPL-ACNC: 1.71 UIU/ML
WBC # FLD AUTO: 4.16 K/UL

## 2022-09-12 ENCOUNTER — RX RENEWAL (OUTPATIENT)
Age: 52
End: 2022-09-12

## 2022-09-13 ENCOUNTER — NON-APPOINTMENT (OUTPATIENT)
Age: 52
End: 2022-09-13

## 2022-09-14 ENCOUNTER — APPOINTMENT (OUTPATIENT)
Dept: CARDIOLOGY | Facility: CLINIC | Age: 52
End: 2022-09-14

## 2022-09-14 VITALS — BODY MASS INDEX: 31 KG/M2 | WEIGHT: 248 LBS

## 2022-09-14 PROCEDURE — 99215 OFFICE O/P EST HI 40 MIN: CPT

## 2022-10-06 ENCOUNTER — APPOINTMENT (OUTPATIENT)
Dept: ORTHOPEDIC SURGERY | Facility: CLINIC | Age: 52
End: 2022-10-06

## 2022-10-06 DIAGNOSIS — M16.0 BILATERAL PRIMARY OSTEOARTHRITIS OF HIP: ICD-10-CM

## 2022-10-06 PROCEDURE — 73502 X-RAY EXAM HIP UNI 2-3 VIEWS: CPT | Mod: RT

## 2022-10-06 PROCEDURE — 99215 OFFICE O/P EST HI 40 MIN: CPT

## 2022-10-07 ENCOUNTER — APPOINTMENT (OUTPATIENT)
Dept: CARDIOLOGY | Facility: CLINIC | Age: 52
End: 2022-10-07

## 2022-10-07 PROBLEM — M16.0 OSTEOARTHRITIS OF BOTH HIPS: Status: ACTIVE | Noted: 2017-02-17

## 2022-10-07 PROCEDURE — 99214 OFFICE O/P EST MOD 30 MIN: CPT | Mod: 95

## 2022-10-11 ENCOUNTER — APPOINTMENT (OUTPATIENT)
Dept: PULMONOLOGY | Facility: CLINIC | Age: 52
End: 2022-10-11

## 2022-10-11 DIAGNOSIS — J30.9 ALLERGIC RHINITIS, UNSPECIFIED: ICD-10-CM

## 2022-10-11 DIAGNOSIS — K21.9 GASTRO-ESOPHAGEAL REFLUX DISEASE W/OUT ESOPHAGITIS: ICD-10-CM

## 2022-10-11 DIAGNOSIS — Z71.89 OTHER SPECIFIED COUNSELING: ICD-10-CM

## 2022-10-11 PROCEDURE — 99213 OFFICE O/P EST LOW 20 MIN: CPT | Mod: 95

## 2022-10-11 NOTE — ASSESSMENT
[FreeTextEntry1] : Mr. SANDOVAL is a 52 year old male with a history of HIV "AIDS", arthritis, HSV 2, GERD, Cardiac arrhythmia, HTN, low testosterone, neuropathy, CLARI, low vitamin D, allergies who video calls into the office today for follow up pulmonary evaluation for sleep apnea.\par \par 1. Severe CLARI on Bipap therapy\par -Patient is compliant and benefiting from treatment-patient to continue nightly use\par -Patient to notify us with significant weight loss i.e. 15+ pounds of loss\par -Additional recommendations included: Avoid alcohol and sedatives at bedtime. Proper sleep hygiene such as maintaining a regular sleep routine, avoiding naps if possible, not watching TV or reading in bed,  and maintaining a quiet, comfortable bedroom. Sleepy driving avoidance and risks discussed with patient. Diet, exercise and weight loss suggested.\par \par Additionally, I have discussed the need for compliance to using the machine nightly for adequate therapy as well as for ongoing coverage by insurance companies. Adequate compliance is understood to be > 4 hours of use nightly. Without adequate compliance, the DME company/insurance company may deny the patient replacement equipment or may also have the right to re-possess the machine.\par \par 2. Allergies/sinuses\par -continue with olopatadine nasal spray PRN\par \par 3.GERD-stable\par -increased recently- likely due to Ozempic use\par -advised to monitor\par -currently off famotidine\par \par Pt to follow up in 6 months for PAP therapy progress\par sooner with issues. \par \par \par

## 2022-10-11 NOTE — REASON FOR VISIT
[Follow-Up] : a follow-up visit [Sleep Evaluation] : sleep evaluation [Home] : at home, [unfilled] , at the time of the visit. [Medical Office: ([facility])___] : at the medical office located in  [Verbal consent obtained from patient] : the patient, [unfilled]

## 2022-10-11 NOTE — PROCEDURE
[FreeTextEntry1] : Usage days 28/30 days (93%)\par >= 4 hours 28 days (93%)\par < 4 hours 0 days (0%)\par Usage hours 183 hours 4 minutes\par Average usage (total days) 6 hours 6 minutes\par Average usage (days used) 6 hours 32 minutes\par Median usage (days used) 6 hours 21 minutes\par Total used hours (value since last reset - 10/10/2022) 1,770 hours\par AirCurve 10 VAuto\par Serial number [id number]\par Mode Spont\par IPAP 17 cmH2O\par EPAP 10 cmH2O\par Easy-Breathe On\par Therapy\par Leaks - L/min Median: 4.8 95th percentile: 19.4 Maximum: 35.7\par Events per hour AI: 2.1 HI: 0.1 AHI: 2.2\par Apnea Index Central: 0.6 Obstructive: 1.4 Unknown: 0.0

## 2022-10-11 NOTE — HISTORY OF PRESENT ILLNESS
[TextBox_4] : Mr. SANDOVAL is a 52 year old male with a history of HIV "AIDS", arthritis, HSV 2, GERD, Cardiac arrhythmia, HTN, low testosterone, neuropathy, CLARI, low vitamin D, allergies who video calls into the office today for follow up pulmonary evaluation for sleep apnea.\par \par \par Patient has had his BiPAP device since approximately November 2021 and has been doing very well with it.\par He still finds it uncomfortable to use/sleep with however understands the need.\par With initial use his daytime drowsiness seem to be much better and reports that he is more refreshed from sleep over all. \par He has been compliant and benefiting from use.\par He reports he has otherwise been stable–no new changes in health conditions.  He was recently added Ozempic by his cardiologist.\par He has since lost 4 pounds since starting it 1 month ago.\par Patient denies chest tightness, SOB @ rest or exertion, wheezing, cough or any other complaints at this time.\par Pt has left over nasal spray from prior and does not need any at this time. \par His GERD is under control with diet changes but noticed 1 daytime episode a couple weeks ago. \par

## 2022-10-12 ENCOUNTER — RX RENEWAL (OUTPATIENT)
Age: 52
End: 2022-10-12

## 2022-11-03 ENCOUNTER — FORM ENCOUNTER (OUTPATIENT)
Age: 52
End: 2022-11-03

## 2022-11-04 ENCOUNTER — APPOINTMENT (OUTPATIENT)
Dept: INFECTIOUS DISEASE | Facility: CLINIC | Age: 52
End: 2022-11-04

## 2022-11-04 VITALS — WEIGHT: 254 LBS | OXYGEN SATURATION: 97 % | HEIGHT: 75 IN | TEMPERATURE: 97.6 F | BODY MASS INDEX: 31.58 KG/M2

## 2022-11-04 DIAGNOSIS — B97.7 PAPILLOMAVIRUS AS THE CAUSE OF DISEASES CLASSIFIED ELSEWHERE: ICD-10-CM

## 2022-11-04 DIAGNOSIS — A63.0 ANOGENITAL (VENEREAL) WARTS: ICD-10-CM

## 2022-11-04 LAB
25(OH)D3 SERPL-MCNC: 50.3 NG/ML
ALBUMIN SERPL ELPH-MCNC: 5.1 G/DL
ALP BLD-CCNC: 66 U/L
ALT SERPL-CCNC: 33 U/L
ANION GAP SERPL CALC-SCNC: 14 MMOL/L
APPEARANCE: CLEAR
AST SERPL-CCNC: 33 U/L
BACTERIA: NEGATIVE
BASOPHILS # BLD AUTO: 0.04 K/UL
BASOPHILS NFR BLD AUTO: 0.9 %
BILIRUB SERPL-MCNC: 0.5 MG/DL
BILIRUBIN URINE: NEGATIVE
BLOOD URINE: NEGATIVE
BUN SERPL-MCNC: 7 MG/DL
CALCIUM SERPL-MCNC: 10.3 MG/DL
CHLORIDE SERPL-SCNC: 102 MMOL/L
CHOLEST SERPL-MCNC: 190 MG/DL
CO2 SERPL-SCNC: 25 MMOL/L
COLOR: NORMAL
CREAT SERPL-MCNC: 1.3 MG/DL
EGFR: 66 ML/MIN/1.73M2
EOSINOPHIL # BLD AUTO: 0.05 K/UL
EOSINOPHIL NFR BLD AUTO: 1.1 %
GLUCOSE QUALITATIVE U: NEGATIVE
GLUCOSE SERPL-MCNC: 99 MG/DL
HCT VFR BLD CALC: 37.6 %
HDLC SERPL-MCNC: 47 MG/DL
HGB BLD-MCNC: 12.8 G/DL
HYALINE CASTS: 0 /LPF
IMM GRANULOCYTES NFR BLD AUTO: 0.2 %
KETONES URINE: NEGATIVE
LDLC SERPL CALC-MCNC: 101 MG/DL
LEUKOCYTE ESTERASE URINE: NEGATIVE
LYMPHOCYTES # BLD AUTO: 2.23 K/UL
LYMPHOCYTES NFR BLD AUTO: 48.7 %
MAN DIFF?: NORMAL
MCHC RBC-ENTMCNC: 29.2 PG
MCHC RBC-ENTMCNC: 34 GM/DL
MCV RBC AUTO: 85.8 FL
MICROSCOPIC-UA: NORMAL
MONOCYTES # BLD AUTO: 0.38 K/UL
MONOCYTES NFR BLD AUTO: 8.3 %
NEUTROPHILS # BLD AUTO: 1.87 K/UL
NEUTROPHILS NFR BLD AUTO: 40.8 %
NITRITE URINE: NEGATIVE
NONHDLC SERPL-MCNC: 143 MG/DL
PH URINE: 6.5
PLATELET # BLD AUTO: 252 K/UL
POTASSIUM SERPL-SCNC: 3.9 MMOL/L
PROT SERPL-MCNC: 8 G/DL
PROTEIN URINE: NORMAL
PSA SERPL-MCNC: 2.53 NG/ML
RBC # BLD: 4.38 M/UL
RBC # FLD: 13.1 %
RED BLOOD CELLS URINE: 2 /HPF
SODIUM SERPL-SCNC: 141 MMOL/L
SPECIFIC GRAVITY URINE: 1.01
SQUAMOUS EPITHELIAL CELLS: 1 /HPF
TRIGL SERPL-MCNC: 214 MG/DL
UROBILINOGEN URINE: NORMAL
WBC # FLD AUTO: 4.58 K/UL
WHITE BLOOD CELLS URINE: 1 /HPF

## 2022-11-04 PROCEDURE — 99214 OFFICE O/P EST MOD 30 MIN: CPT

## 2022-11-04 PROCEDURE — 90686 IIV4 VACC NO PRSV 0.5 ML IM: CPT

## 2022-11-04 PROCEDURE — G0008: CPT

## 2022-11-04 NOTE — HISTORY OF PRESENT ILLNESS
[FreeTextEntry1] : 11/4/22----VENU SANDOVAL is a 52 year old male being seen for a follow-up visit. Here for 6 month visit. .\par Diagnosed with AIDS in 2008. Now Cd4 over 500. Undetrectable VL. .\par recently went to dental 2020 and optho. \par went to ENT and pulmonology and Jordy Peraza for lipids and HTN. \par updated meds list 5/7/2021---Doing well on Genvoya, Amlodipine 5mg Pt also had recent stress test . Pt to continue the Lisinopril-Hydrochlorothiazide 20-12.5 MG Oral Tablet and Vit D3 as well. rosuvastatin 10 mg from Dr. Jordy Peraza and famotidine 40mg daily . \par No family Hx of Colon Ca. \par Seen by Dr. Craig and doing well. \par Had the colonoscopy , all well, has follow up. \par had pfizer covid vaccine\par plan 11/4/22: \par 1) continue Genvoya and all meds\par 2) labs today see in 6 months\par 3) needs anoscopy with Dr. Brown, Dr Peraza or , Dr. Chopra \par \par Pt states familal history of Multiple myloma. \par \par Sexual History: The patient is sexually active. The patient has anal intercourse with men. He is currently sexually active with 0 male partner(s). 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)\par Hyperlipidemia, mild (272.4) (E78.5)\par Hypertension (401.9) (I10)\par Insomnia (780.52) (G47.00)\par Intermittent memory loss (780.93) (R41.3)\par Low testosterone (790.99) (R79.89)\par Need for Menactra vaccination (V03.89) (Z23)\par Neuropathy involving both lower extremities (356.9) (G57.93)\par CLARI (obstructive sleep apnea) (327.23) (G47.33)\par Osteoarthritis of both hips (715.95) (M16.0)\par Preop testing (V72.84) (Z01.818)\par Preop testing (V72.84) (Z01.818)\par Pre-procedural laboratory examination (V72.63) (Z01.812)\par Prostate cancer screening (V76.44) (Z12.5)\par Rash (782.1) (R21)\par Scoliosis (737.30) (M41.9)\par Shortness of breath on exertion (786.05) (R06.02)\par Sickle cell trait (282.5) (D57.3)\par Skin rash (782.1) (R21)\par Sleep apnea (780.57) (G47.30)\par Vitamin D deficiency (268.9) (E55.9)\par \par Past Medical History\par History of blood transfusion (V15.89) (Z92.89)\par History of candidiasis of mouth (V12.09) (Z86.19)\par History of complete eye exam (V15.89) (Z92.89)\par History of dental examination (V15.89) (Z92.89)\par History of gonorrhea (V12.09) (Z86.19)\par History of hemorrhoids (V13.89) (Z87.19)\par History of pneumocystis pneumonia (V12.61) (Z87.01)\par History of Hypophosphatemia (275.3) (E83.39)\par History of Perirectal ulcer (569.41) (K62.6)\par History of Walking pneumonia (486) (J18.9)\par \par Current Meds\par amLODIPine Besylate 5 MG Oral Tablet; TAKE ONE TABLET BY MOUTH DAILY\par Azelastine HCl - 0.1 % Nasal Solution; INHALE 2 SQUIRT Twice daily\par Diclofenac Sodium 75 MG Oral Tablet Delayed Release; Take 1 tablet twice daily\par Famotidine 40 MG Oral Tablet; TAKE 1 TABLET AT BEDTIME\par Genvoya [phone] MG Oral Tablet; TAKE 1 TABLET BY MOUTH DAILY\par Lisinopril-hydroCHLOROthiazide 20-12.5 MG Oral Tablet; TAKE ONE TABLET BY MOUTH\par DAILY\par Rosuvastatin Calcium 10 MG Oral Tablet; TAKE 1 TABLET DAILY\par Vitamin D (Ergocalciferol) 1.25 MG (93180 UT) Oral Capsule; TAKE 1 CAPSULE BY\par MOUTH EVERY 2 WEEKS\par \par Allergies\par No Known Drug Allergies\par Animal dander - Cats\par Mold\par

## 2022-11-08 ENCOUNTER — APPOINTMENT (OUTPATIENT)
Dept: CARDIOLOGY | Facility: CLINIC | Age: 52
End: 2022-11-08

## 2022-11-08 DIAGNOSIS — R73.03 PREDIABETES.: ICD-10-CM

## 2022-11-08 PROCEDURE — 99215 OFFICE O/P EST HI 40 MIN: CPT | Mod: 95

## 2022-11-09 ENCOUNTER — RX RENEWAL (OUTPATIENT)
Age: 52
End: 2022-11-09

## 2022-11-09 LAB
ANAL PAP CYTOLOGY: NORMAL
C TRACH RRNA SPEC QL NAA+PROBE: NOT DETECTED
CD3 CELLS # BLD: 1521 /UL
CD3 CELLS NFR BLD: 71 %
CD3+CD4+ CELLS # BLD: 515 /UL
CD3+CD4+ CELLS NFR BLD: 24 %
CD3+CD4+ CELLS/CD3+CD8+ CLL SPEC: 0.55 RATIO
CD3+CD8+ CELLS # SPEC: 942 /UL
CD3+CD8+ CELLS NFR BLD: 44 %
ESTIMATED AVERAGE GLUCOSE: 123 MG/DL
HBA1C MFR BLD HPLC: 5.9 %
HCV AB SER QL: NONREACTIVE
HCV S/CO RATIO: 0.1 S/CO
HIV1 RNA # SERPL NAA+PROBE: NORMAL
HIV1 RNA # SERPL NAA+PROBE: NORMAL COPIES/ML
N GONORRHOEA RRNA SPEC QL NAA+PROBE: NOT DETECTED
SOURCE AMPLIFICATION: NORMAL
SOURCE ANAL: NORMAL
SOURCE ORAL: NORMAL
T PALLIDUM AB SER QL IA: NEGATIVE
VIRAL LOAD INTERP: NORMAL
VIRAL LOAD LOG: NORMAL LG COP/ML

## 2022-11-11 ENCOUNTER — NON-APPOINTMENT (OUTPATIENT)
Age: 52
End: 2022-11-11

## 2022-11-16 LAB — HPV DNA, HIGH RISK, LOW RISK, ANAL: DETECTED

## 2022-11-18 ENCOUNTER — APPOINTMENT (OUTPATIENT)
Dept: SURGERY | Facility: CLINIC | Age: 52
End: 2022-11-18

## 2022-11-18 VITALS
TEMPERATURE: 96.6 F | RESPIRATION RATE: 16 BRPM | DIASTOLIC BLOOD PRESSURE: 78 MMHG | OXYGEN SATURATION: 100 % | SYSTOLIC BLOOD PRESSURE: 134 MMHG | HEIGHT: 75 IN | BODY MASS INDEX: 30.46 KG/M2 | HEART RATE: 80 BPM | WEIGHT: 245 LBS

## 2022-11-18 PROCEDURE — 99203 OFFICE O/P NEW LOW 30 MIN: CPT | Mod: 25

## 2022-11-18 PROCEDURE — 46220 EXCISE ANAL EXT TAG/PAPILLA: CPT

## 2022-11-18 NOTE — PHYSICAL EXAM
[Abdomen Tenderness] : ~T No ~M abdominal tenderness [Wheezing] : no wheezing was heard [Normal Rate and Rhythm] : normal rate and rhythm [No Rash or Lesion] : No rash or lesion [Alert] : alert [Calm] : calm [de-identified] : Perianal inspection reveals a raised flat whitish perianal lesion in the left posterior position at the anal verge approximately 2 cm in diameter.  Anal rectal tone is diminished.  There is no obvious fissure fistula or abscess.  Anoscopy reveals mild nonbleeding internal hemorrhoids.  After the risks benefits and alternatives were explained the lesion was infiltrated with 5 cc of half percent Marcaine with 1% lidocaine and a 5 mm piece of the lesion was excised.  Hemostasis was achieved with Monsel solution and the specimen sent to pathology for permanent examination. [de-identified] : nad [de-identified] : nl

## 2022-11-18 NOTE — HISTORY OF PRESENT ILLNESS
[FreeTextEntry1] : Pravin is a 53 y/o male here for a consultation. \par \par Colonoscopy from 7/16/21 demonstrates - Redundant colon and significant looping. Unable to advance scope past the ascending colon despite multiple attempts with manual pressure, changing                     patient to supine position and straightening / shortening the scope. No polyps identified in the entire examined portion of the colon. No specimens collected.\par \par Today pt reports feeling no pain.  A couple of months ago March patient felt small lump and burst, pain L inside buttock cheek, drainage slight yellow.  Patient is here to check what he thinks is a lesion.  Soft BMs daily, straining.  Takes Benefiber and senna.  Good appetite.  No c/o nausea/vomiting.  Denies fever and chills.  Not on anticoagulants.\par

## 2022-11-18 NOTE — CONSULT LETTER
[Dear  ___] : Dear  [unfilled], [Consult Letter:] : I had the pleasure of evaluating your patient, [unfilled]. [Please see my note below.] : Please see my note below. [Consult Closing:] : Thank you very much for allowing me to participate in the care of this patient.  If you have any questions, please do not hesitate to contact me. [Sincerely,] : Sincerely, [FreeTextEntry3] : Nahun Tucker M.D., F.A.C.S, F.A.S.C.R.S

## 2022-11-23 ENCOUNTER — APPOINTMENT (OUTPATIENT)
Dept: INFECTIOUS DISEASE | Facility: CLINIC | Age: 52
End: 2022-11-23

## 2022-11-28 ENCOUNTER — NON-APPOINTMENT (OUTPATIENT)
Age: 52
End: 2022-11-28

## 2022-11-28 LAB — CORE LAB BIOPSY: NORMAL

## 2022-12-06 ENCOUNTER — APPOINTMENT (OUTPATIENT)
Dept: CARDIOLOGY | Facility: CLINIC | Age: 52
End: 2022-12-06

## 2022-12-07 ENCOUNTER — APPOINTMENT (OUTPATIENT)
Dept: CARDIOLOGY | Facility: CLINIC | Age: 52
End: 2022-12-07

## 2022-12-07 PROCEDURE — 99215 OFFICE O/P EST HI 40 MIN: CPT | Mod: 95

## 2022-12-14 ENCOUNTER — APPOINTMENT (OUTPATIENT)
Dept: OPHTHALMOLOGY | Facility: CLINIC | Age: 52
End: 2022-12-14

## 2022-12-14 ENCOUNTER — NON-APPOINTMENT (OUTPATIENT)
Age: 52
End: 2022-12-14

## 2022-12-14 PROCEDURE — 92014 COMPRE OPH EXAM EST PT 1/>: CPT

## 2022-12-15 PROBLEM — C21.0 SQUAMOUS CELL CANCER, ANUS: Status: ACTIVE | Noted: 2022-12-15

## 2022-12-16 ENCOUNTER — APPOINTMENT (OUTPATIENT)
Dept: SURGERY | Facility: CLINIC | Age: 52
End: 2022-12-16

## 2022-12-16 VITALS
BODY MASS INDEX: 30.21 KG/M2 | WEIGHT: 243 LBS | OXYGEN SATURATION: 96 % | DIASTOLIC BLOOD PRESSURE: 88 MMHG | RESPIRATION RATE: 16 BRPM | TEMPERATURE: 96.5 F | SYSTOLIC BLOOD PRESSURE: 136 MMHG | HEIGHT: 75 IN | HEART RATE: 95 BPM

## 2022-12-16 DIAGNOSIS — C21.0 MALIGNANT NEOPLASM OF ANUS, UNSPECIFIED: ICD-10-CM

## 2022-12-16 DIAGNOSIS — D04.5 CARCINOMA IN SITU OF SKIN OF TRUNK: ICD-10-CM

## 2022-12-16 PROCEDURE — 99213 OFFICE O/P EST LOW 20 MIN: CPT

## 2022-12-16 NOTE — ASSESSMENT
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  I recommended wide local excision of both areas (left posterior and anterior midline).  I explained the risks benefits and alternatives including the risk of bleeding, infection, recurrence and possible malignancy on final pathology.

## 2022-12-16 NOTE — PHYSICAL EXAM
[Abdomen Tenderness] : ~T No ~M abdominal tenderness [No HSM] : no hepatosplenomegaly [de-identified] : Perianal inspection reveals a white plaque-like raised area in the left posterior position corresponding to his previous biopsy site.  There is no sign of infection.  There is a synchronous 1 cm in diameter similar-appearing smaller lesion in the anterior midline.

## 2022-12-16 NOTE — HISTORY OF PRESENT ILLNESS
[FreeTextEntry1] : Pravin is a 53 y/o male here for follow up visit\par \par Tissue Biopsy 11/18/22 - Left posterior naida anal lesion biopsy - Squamous cell carcinoma in situ, involves the peripheral biopsy margins. \par \par Colonoscopy from 7/16/21 demonstrates - Redundant colon and significant looping. Unable to advance scope past the ascending colon despite multiple attempts with manual pressure, changing  patient to supine position and straightening / shortening the scope. No polyps identified in the entire examined portion of the colon. No specimens collected.\par \par Last seen on 11/18/22- The patient has a history of HIV and HPV. He has intercourse with men and has a new left posterior lesion of the perianal skin which was biopsied in the office today. I instructed him to keep the area clean. He will call next week to follow-up on pathology and any additional treatment will be based on biopsy results. \par \par Today pt reports no pain. Daily BMs, formed, no straining, denies pain, no bleeding, no episodes of incontinence, and denies feeling swollen or prolapsed tissue. Does have some clear thick non-odorous discharge leaking daily, uses a gauze. Does have slight nausea but no vomiting. Denies fever and chills. Fair appetite. Not taking any anticoagulants. \par \par

## 2022-12-20 ENCOUNTER — TRANSCRIPTION ENCOUNTER (OUTPATIENT)
Age: 52
End: 2022-12-20

## 2022-12-20 ENCOUNTER — OUTPATIENT (OUTPATIENT)
Dept: OUTPATIENT SERVICES | Facility: HOSPITAL | Age: 52
LOS: 1 days | End: 2022-12-20
Payer: COMMERCIAL

## 2022-12-20 VITALS
OXYGEN SATURATION: 97 % | TEMPERATURE: 98 F | SYSTOLIC BLOOD PRESSURE: 123 MMHG | HEART RATE: 75 BPM | HEIGHT: 75 IN | RESPIRATION RATE: 16 BRPM | DIASTOLIC BLOOD PRESSURE: 77 MMHG | WEIGHT: 244.05 LBS

## 2022-12-20 DIAGNOSIS — M20.41 OTHER HAMMER TOE(S) (ACQUIRED), RIGHT FOOT: Chronic | ICD-10-CM

## 2022-12-20 DIAGNOSIS — I10 ESSENTIAL (PRIMARY) HYPERTENSION: ICD-10-CM

## 2022-12-20 DIAGNOSIS — Z98.890 OTHER SPECIFIED POSTPROCEDURAL STATES: Chronic | ICD-10-CM

## 2022-12-20 DIAGNOSIS — G47.33 OBSTRUCTIVE SLEEP APNEA (ADULT) (PEDIATRIC): Chronic | ICD-10-CM

## 2022-12-20 DIAGNOSIS — G47.33 OBSTRUCTIVE SLEEP APNEA (ADULT) (PEDIATRIC): ICD-10-CM

## 2022-12-20 DIAGNOSIS — D04.5 CARCINOMA IN SITU OF SKIN OF TRUNK: ICD-10-CM

## 2022-12-20 DIAGNOSIS — Z01.818 ENCOUNTER FOR OTHER PREPROCEDURAL EXAMINATION: ICD-10-CM

## 2022-12-20 LAB
ANION GAP SERPL CALC-SCNC: 14 MMOL/L — SIGNIFICANT CHANGE UP (ref 5–17)
BUN SERPL-MCNC: 11 MG/DL — SIGNIFICANT CHANGE UP (ref 7–23)
CALCIUM SERPL-MCNC: 9.6 MG/DL — SIGNIFICANT CHANGE UP (ref 8.4–10.5)
CHLORIDE SERPL-SCNC: 103 MMOL/L — SIGNIFICANT CHANGE UP (ref 96–108)
CO2 SERPL-SCNC: 26 MMOL/L — SIGNIFICANT CHANGE UP (ref 22–31)
CREAT SERPL-MCNC: 1.4 MG/DL — HIGH (ref 0.5–1.3)
EGFR: 60 ML/MIN/1.73M2 — SIGNIFICANT CHANGE UP
GLUCOSE SERPL-MCNC: 93 MG/DL — SIGNIFICANT CHANGE UP (ref 70–99)
HCT VFR BLD CALC: 37.2 % — LOW (ref 39–50)
HGB BLD-MCNC: 12.4 G/DL — LOW (ref 13–17)
MCHC RBC-ENTMCNC: 29.1 PG — SIGNIFICANT CHANGE UP (ref 27–34)
MCHC RBC-ENTMCNC: 33.3 GM/DL — SIGNIFICANT CHANGE UP (ref 32–36)
MCV RBC AUTO: 87.3 FL — SIGNIFICANT CHANGE UP (ref 80–100)
NRBC # BLD: 0 /100 WBCS — SIGNIFICANT CHANGE UP (ref 0–0)
PLATELET # BLD AUTO: 242 K/UL — SIGNIFICANT CHANGE UP (ref 150–400)
POTASSIUM SERPL-MCNC: 3.3 MMOL/L — LOW (ref 3.5–5.3)
POTASSIUM SERPL-SCNC: 3.3 MMOL/L — LOW (ref 3.5–5.3)
RBC # BLD: 4.26 M/UL — SIGNIFICANT CHANGE UP (ref 4.2–5.8)
RBC # FLD: 13 % — SIGNIFICANT CHANGE UP (ref 10.3–14.5)
SARS-COV-2 N GENE NPH QL NAA+PROBE: NOT DETECTED
SODIUM SERPL-SCNC: 143 MMOL/L — SIGNIFICANT CHANGE UP (ref 135–145)
WBC # BLD: 3.87 K/UL — SIGNIFICANT CHANGE UP (ref 3.8–10.5)
WBC # FLD AUTO: 3.87 K/UL — SIGNIFICANT CHANGE UP (ref 3.8–10.5)

## 2022-12-20 PROCEDURE — 80048 BASIC METABOLIC PNL TOTAL CA: CPT

## 2022-12-20 PROCEDURE — G0463: CPT

## 2022-12-20 PROCEDURE — 85027 COMPLETE CBC AUTOMATED: CPT

## 2022-12-20 RX ORDER — SODIUM CHLORIDE 9 MG/ML
1000 INJECTION, SOLUTION INTRAVENOUS
Refills: 0 | Status: DISCONTINUED | OUTPATIENT
Start: 2022-12-21 | End: 2023-01-04

## 2022-12-20 RX ORDER — LIDOCAINE HCL 20 MG/ML
0.2 VIAL (ML) INJECTION ONCE
Refills: 0 | Status: DISCONTINUED | OUTPATIENT
Start: 2022-12-21 | End: 2023-01-04

## 2022-12-20 RX ORDER — SODIUM CHLORIDE 9 MG/ML
3 INJECTION INTRAMUSCULAR; INTRAVENOUS; SUBCUTANEOUS EVERY 8 HOURS
Refills: 0 | Status: DISCONTINUED | OUTPATIENT
Start: 2022-12-21 | End: 2023-01-04

## 2022-12-20 NOTE — H&P PST ADULT - ASSESSMENT
DASI: 9.89,  pt states he walks for 30 min/ 3times a week, can climb 2-3 FOS w/out s/s of C/P or SOB

## 2022-12-20 NOTE — H&P PST ADULT - HISTORY OF PRESENT ILLNESS
52 yr old male with PMH of HIV (levels undetectable: ID note in chart), HPV, HTN, HLD, On Ozempic for weight loss, CLARI (severe on BiPAP). Pt reports recent anal lesion biopsy revealing Squamous cell carcinoma in situ. Pt denies drainage or pain at this time. Pt evaluated by Dr. Tucker for a scheduled Excision of anal mass x 2 on 12/21/22. Pt denies recent travel, sick contact, or covid infection.     **Covid swab on 12/19 at [address].

## 2022-12-20 NOTE — H&P PST ADULT - NSICDXPASTSURGICALHX_GEN_ALL_CORE_FT
PAST SURGICAL HISTORY:  Hammertoes of both feet     CLARI treated with BiPAP     S/P colonoscopy

## 2022-12-20 NOTE — H&P PST ADULT - NSICDXPASTMEDICALHX_GEN_ALL_CORE_FT
PAST MEDICAL HISTORY:  High cholesterol     HIV disease     HTN (hypertension)     CLARI treated with BiPAP

## 2022-12-21 ENCOUNTER — TRANSCRIPTION ENCOUNTER (OUTPATIENT)
Age: 52
End: 2022-12-21

## 2022-12-21 ENCOUNTER — OUTPATIENT (OUTPATIENT)
Dept: OUTPATIENT SERVICES | Facility: HOSPITAL | Age: 52
LOS: 1 days | End: 2022-12-21
Payer: COMMERCIAL

## 2022-12-21 ENCOUNTER — APPOINTMENT (OUTPATIENT)
Dept: SURGERY | Facility: HOSPITAL | Age: 52
End: 2022-12-21
Payer: COMMERCIAL

## 2022-12-21 ENCOUNTER — RESULT REVIEW (OUTPATIENT)
Age: 52
End: 2022-12-21

## 2022-12-21 VITALS
DIASTOLIC BLOOD PRESSURE: 70 MMHG | HEART RATE: 87 BPM | RESPIRATION RATE: 16 BRPM | TEMPERATURE: 98 F | SYSTOLIC BLOOD PRESSURE: 159 MMHG | WEIGHT: 244.05 LBS | HEIGHT: 75 IN

## 2022-12-21 VITALS
OXYGEN SATURATION: 100 % | HEART RATE: 74 BPM | TEMPERATURE: 97 F | RESPIRATION RATE: 16 BRPM | SYSTOLIC BLOOD PRESSURE: 110 MMHG | DIASTOLIC BLOOD PRESSURE: 75 MMHG

## 2022-12-21 DIAGNOSIS — Z98.890 OTHER SPECIFIED POSTPROCEDURAL STATES: Chronic | ICD-10-CM

## 2022-12-21 DIAGNOSIS — D04.5 CARCINOMA IN SITU OF SKIN OF TRUNK: ICD-10-CM

## 2022-12-21 DIAGNOSIS — M20.41 OTHER HAMMER TOE(S) (ACQUIRED), RIGHT FOOT: Chronic | ICD-10-CM

## 2022-12-21 DIAGNOSIS — G47.33 OBSTRUCTIVE SLEEP APNEA (ADULT) (PEDIATRIC): Chronic | ICD-10-CM

## 2022-12-21 PROCEDURE — 11623 EXC S/N/H/F/G MAL+MRG 2.1-3: CPT

## 2022-12-21 PROCEDURE — 46922 EXCISION OF ANAL LESION(S): CPT

## 2022-12-21 PROCEDURE — 88305 TISSUE EXAM BY PATHOLOGIST: CPT | Mod: 26

## 2022-12-21 PROCEDURE — 88365 INSITU HYBRIDIZATION (FISH): CPT | Mod: 26

## 2022-12-21 PROCEDURE — 11621 EXC S/N/H/F/G MAL+MRG 0.6-1: CPT

## 2022-12-21 PROCEDURE — 88304 TISSUE EXAM BY PATHOLOGIST: CPT | Mod: 26

## 2022-12-21 PROCEDURE — 88305 TISSUE EXAM BY PATHOLOGIST: CPT

## 2022-12-21 PROCEDURE — 88365 INSITU HYBRIDIZATION (FISH): CPT

## 2022-12-21 PROCEDURE — 88304 TISSUE EXAM BY PATHOLOGIST: CPT

## 2022-12-21 RX ORDER — OXYCODONE HYDROCHLORIDE 5 MG/1
1 TABLET ORAL
Qty: 15 | Refills: 0
Start: 2022-12-21

## 2022-12-21 RX ORDER — ONDANSETRON 8 MG/1
4 TABLET, FILM COATED ORAL ONCE
Refills: 0 | Status: DISCONTINUED | OUTPATIENT
Start: 2022-12-21 | End: 2023-01-04

## 2022-12-21 RX ORDER — METRONIDAZOLE 7.5 MG/G
1 GEL VAGINAL
Qty: 45 | Refills: 0
Start: 2022-12-21

## 2022-12-21 RX ORDER — METRONIDAZOLE 7.5 MG/G
1 GEL VAGINAL
Qty: 30 | Refills: 0
Start: 2022-12-21

## 2022-12-21 RX ORDER — OXYCODONE HYDROCHLORIDE 5 MG/1
5 TABLET ORAL ONCE
Refills: 0 | Status: DISCONTINUED | OUTPATIENT
Start: 2022-12-21 | End: 2022-12-21

## 2022-12-21 RX ADMIN — SODIUM CHLORIDE 100 MILLILITER(S): 9 INJECTION, SOLUTION INTRAVENOUS at 11:10

## 2022-12-21 NOTE — BRIEF OPERATIVE NOTE - VENOUS THROMBOEMBOLISM PROPHYLAXIS THERAPY
[FreeTextEntry1] : 15 month old male here with cough x 3 days. Recommend supportive care including antipyretics, fluids, and nasal saline followed by nasal suction. Return if symptoms worsen or persist. All questions answered. Parent verbalized agreement with the above plan. 
SCDs, Early Ambulation

## 2022-12-21 NOTE — ASU DISCHARGE PLAN (ADULT/PEDIATRIC) - CALL YOUR DOCTOR IF YOU HAVE ANY OF THE FOLLOWING:
[Takes medication as prescribed] : takes
Bleeding that does not stop/Swelling that gets worse/Pain not relieved by Medications

## 2022-12-21 NOTE — ASU DISCHARGE PLAN (ADULT/PEDIATRIC) - CARE PROVIDER_API CALL
Nahun Tucker)  ColonRectal Surgery; Surgery  [address], [address]  [place] [address]  Phone: [phone]  Fax: [phone]  Follow Up Time: 2 weeks

## 2022-12-21 NOTE — ASU PATIENT PROFILE, ADULT - FALL HARM RISK - UNIVERSAL INTERVENTIONS
Bed in lowest position, wheels locked, appropriate side rails in place/Call bell, personal items and telephone in reach/Instruct patient to call for assistance before getting out of bed or chair/Non-slip footwear when patient is out of bed/[place] to call system/Physically safe environment - no spills, clutter or unnecessary equipment/Purposeful Proactive Rounding/Room/bathroom lighting operational, light cord in reach

## 2022-12-21 NOTE — BRIEF OPERATIVE NOTE - OPERATION/FINDINGS
Excision of 2 anal squamous cell in situ lesions using electrocautery.  Both specimens sent for pathology.  Surgical sites left to heal by secondary intention.

## 2022-12-21 NOTE — ASU DISCHARGE PLAN (ADULT/PEDIATRIC) - ASU DC SPECIAL INSTRUCTIONSFT
- Please follow the instructions on the postoperative sheet given to you after your surgery  - Please follow-up with Dr. Tucker in 2 weeks  - Please call the office with any questions.

## 2022-12-27 ENCOUNTER — APPOINTMENT (OUTPATIENT)
Dept: OPHTHALMOLOGY | Facility: CLINIC | Age: 52
End: 2022-12-27
Payer: COMMERCIAL

## 2022-12-27 ENCOUNTER — NON-APPOINTMENT (OUTPATIENT)
Age: 52
End: 2022-12-27

## 2022-12-27 PROCEDURE — 68801 DILATE TEAR DUCT OPENING: CPT | Mod: 50

## 2022-12-27 PROCEDURE — 99214 OFFICE O/P EST MOD 30 MIN: CPT | Mod: 25

## 2023-01-03 PROBLEM — G47.33 OBSTRUCTIVE SLEEP APNEA (ADULT) (PEDIATRIC): Chronic | Status: ACTIVE | Noted: 2022-12-20

## 2023-01-10 LAB — SURGICAL PATHOLOGY STUDY: SIGNIFICANT CHANGE UP

## 2023-01-13 ENCOUNTER — NON-APPOINTMENT (OUTPATIENT)
Age: 53
End: 2023-01-13

## 2023-01-13 ENCOUNTER — APPOINTMENT (OUTPATIENT)
Dept: SURGERY | Facility: CLINIC | Age: 53
End: 2023-01-13
Payer: MEDICAID

## 2023-01-13 VITALS
OXYGEN SATURATION: 99 % | RESPIRATION RATE: 16 BRPM | DIASTOLIC BLOOD PRESSURE: 84 MMHG | HEART RATE: 100 BPM | TEMPERATURE: 96.6 F | SYSTOLIC BLOOD PRESSURE: 164 MMHG

## 2023-01-13 PROCEDURE — 99024 POSTOP FOLLOW-UP VISIT: CPT

## 2023-01-13 PROCEDURE — 17250 CHEM CAUT OF GRANLTJ TISSUE: CPT

## 2023-01-13 NOTE — PHYSICAL EXAM
[Abdomen Tenderness] : ~T No ~M abdominal tenderness [de-identified] : Perianal inspection reveals healing incisions in the left posterior and anterior midline positions.  There is healthy granulation tissue which was cauterized with silver nitrate.  There is no sign of infection.

## 2023-01-13 NOTE — HISTORY OF PRESENT ILLNESS
[FreeTextEntry1] : Pravin is a 51 y/o male here for post-op visit\par \par s/p Excision of perianal mass x 2 on 12/21/22 for perianal squamous cell carcinoma in situ\par \par Colonoscopy from 7/16/21 demonstrates - Redundant colon and significant looping. Unable to advance scope past the ascending colon despite multiple attempts with manual pressure, changing patient to supine position and straightening / shortening the scope. No polyps identified in the entire examined portion of the colon. No specimens collected.\par \par Today pt reports feeling no pain, with a little drain.  Soft BMs once daily.   Good appetite.  No c/o nausea/vomiting.  Denies fever and chills.  Not on anticoagulants.\par

## 2023-01-13 NOTE — ASSESSMENT
[FreeTextEntry1] : In summary the patient is doing well 2-week status post excision of perianal squamous cell carcinoma in situ in the left lateral position.  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  The pathology on the squamous cell carcinoma in situ lesion had a positive margin.  His incision is healing appropriately and there is no sign of residual lesion.  I will see him in the office in 2 weeks.  I explained the treatment options include wider excision however this would leave him with a significant risk of anal stricture.  Therefore I will likely recommend topical Aldara daily for 16 weeks once his incisions are healed.
Patient baseline mental status

## 2023-01-31 ENCOUNTER — APPOINTMENT (OUTPATIENT)
Dept: SURGERY | Facility: CLINIC | Age: 53
End: 2023-01-31
Payer: MEDICAID

## 2023-01-31 VITALS
HEART RATE: 99 BPM | OXYGEN SATURATION: 98 % | TEMPERATURE: 97.3 F | SYSTOLIC BLOOD PRESSURE: 153 MMHG | DIASTOLIC BLOOD PRESSURE: 76 MMHG | RESPIRATION RATE: 17 BRPM

## 2023-01-31 DIAGNOSIS — K62.9 DISEASE OF ANUS AND RECTUM, UNSPECIFIED: ICD-10-CM

## 2023-01-31 PROCEDURE — 99212 OFFICE O/P EST SF 10 MIN: CPT

## 2023-01-31 NOTE — ASSESSMENT
[FreeTextEntry1] : In summary the patient is doing well status post excision of perianal lesions.  Pathology reveals condyloma and squamous cell carcinoma in situ with positive margin.  There is no gross residual disease.  I will see him again in the office in 2 weeks and at that point as long as his incisions are healed we will commence treatment with Aldara 5% cream

## 2023-01-31 NOTE — PHYSICAL EXAM
[de-identified] : Perianal inspection reveals healing granulating incisions in the left lateral and anterior midline positions.  There is no sign of infection or residual lesion.  Anal rectal tone is diminished.

## 2023-01-31 NOTE — HISTORY OF PRESENT ILLNESS
[FreeTextEntry1] : Pravin is a 51 y/o male here for post-op visit\par \par s/p Excision of perianal mass x 2 on 12/21/22 for perianal squamous cell carcinoma in situ\par Pathology: Perianal mass left, biopsy: Squamous cell carcinoma in situ.  Perianal mass anterior midline, biopsy: Condyloma acuminate.\par \par CT Colonography 01/17/22 - Colon is visualized in its entirety from the rectum to cecum.  Mild colonic diverticulosis.  Ileocecal valve is visualized.  Appendix is normal.  No colonic polyp or mass is identified.   No colonic polyp or mass. \par \par Colonoscopy from 7/16/21 demonstrates - Redundant colon and significant looping. Unable to advance scope past the ascending colon despite multiple attempts with manual pressure, changing patient to supine position and straightening / shortening the scope. No polyps identified in the entire examined portion of the colon. No specimens collected.\par \par Today pt reports no pain. Daily BMs, formed, denies pain, no bleeding,slight increase in mucus drainage - keeps a gauze and uses Metronidazole gel, and does feel some swelling near incision. Denies nausea and vomiting. Denies fever and chills. Good appetite. Not taking any anticoagulants. \par

## 2023-02-14 ENCOUNTER — APPOINTMENT (OUTPATIENT)
Dept: SURGERY | Facility: CLINIC | Age: 53
End: 2023-02-14
Payer: MEDICAID

## 2023-02-14 VITALS
TEMPERATURE: 97.2 F | OXYGEN SATURATION: 100 % | HEART RATE: 93 BPM | DIASTOLIC BLOOD PRESSURE: 81 MMHG | SYSTOLIC BLOOD PRESSURE: 130 MMHG | RESPIRATION RATE: 17 BRPM

## 2023-02-14 PROCEDURE — 99212 OFFICE O/P EST SF 10 MIN: CPT

## 2023-02-14 NOTE — PHYSICAL EXAM
[Normal Breath Sounds] : Normal breath sounds [Normal Heart Sounds] : normal heart sounds [No Rash or Lesion] : No rash or lesion [Alert] : alert [Oriented to Person] : oriented to person [Oriented to Place] : oriented to place [Oriented to Time] : oriented to time [Calm] : calm [de-identified] : Perianal inspection reveals nearly healed incisions.  There is no evidence of recurrent or residual lesion.  A small amount of granulation tissue in the anterior midline was cauterized with silver nitrate.  There is no sign of infection.  Anal rectal tone is normal. [de-identified] : WNL [de-identified] : WNL [de-identified] : GRACIELAL [de-identified] : WNL ROM [de-identified] : WNL

## 2023-02-14 NOTE — HISTORY OF PRESENT ILLNESS
[FreeTextEntry1] : Pravin is a 51 y/o male here for a post - op visit, s/p Excision of perianal mass x 2 on 12/21/22 for perianal squamous cell carcinoma in situ. Pathology: Perianal mass left, biopsy: Squamous cell carcinoma in situ. Perianal mass anterior midline, biopsy: Condyloma acuminate.\par \par CT Colonography 01/17/22 - Colon is visualized in its entirety from the rectum to cecum. Mild colonic diverticulosis. Ileocecal valve is visualized. Appendix is normal. No colonic polyp or mass is identified. No colonic polyp or mass. \par \par Colonoscopy from 7/16/21 demonstrates - Redundant colon and significant looping. Unable to advance scope past the ascending colon despite multiple attempts with manual pressure, changing patient to supine position and straightening / shortening the scope. No polyps identified in the entire examined portion of the colon. No specimens collected.\par \par Last seen on 1/31/23 - Rectal :. Perianal inspection reveals healing granulating incisions in the left lateral and anterior midline positions. There is no sign of infection or residual lesion. Anal rectal tone is diminished. In summary the patient is doing well status post excision of perianal lesions. Pathology reveals condyloma and squamous cell carcinoma in situ with positive margin. There is no gross residual disease. I will see him again in the office in 2 weeks and at that point as long as his incisions are healed we will commence treatment with Aldara 5% cream. \par \par Today pt reports no pain. Daily BMs, formed, no straining, denies pain, no bleeding, often has leakage of stool (small amount) - keeps a gauze in place, and denies feeling swollen or prolapsed tissue. Not taking any anticoagulants. \par \par \par

## 2023-02-14 NOTE — ASSESSMENT
[FreeTextEntry1] : In summary the patient is doing well status post local excision of 2 perianal lesions.  Pathology reveals anal condyloma and squamous cell carcinoma in situ with a positive margin.  There is no gross evidence of residual lesion and his incisions are healing appropriately.  He will start topical Aldara cream 3 times a week for 16 weeks.  I will see him in 1 month for a checkup and then every 2 to 3 months for the next year.

## 2023-03-06 ENCOUNTER — RX RENEWAL (OUTPATIENT)
Age: 53
End: 2023-03-06

## 2023-03-07 ENCOUNTER — RX RENEWAL (OUTPATIENT)
Age: 53
End: 2023-03-07

## 2023-03-08 ENCOUNTER — APPOINTMENT (OUTPATIENT)
Dept: CARDIOLOGY | Facility: CLINIC | Age: 53
End: 2023-03-08

## 2023-03-08 ENCOUNTER — RX RENEWAL (OUTPATIENT)
Age: 53
End: 2023-03-08

## 2023-03-14 ENCOUNTER — APPOINTMENT (OUTPATIENT)
Dept: SURGERY | Facility: CLINIC | Age: 53
End: 2023-03-14
Payer: MEDICAID

## 2023-03-14 VITALS
HEART RATE: 81 BPM | RESPIRATION RATE: 17 BRPM | OXYGEN SATURATION: 100 % | TEMPERATURE: 97.1 F | DIASTOLIC BLOOD PRESSURE: 85 MMHG | SYSTOLIC BLOOD PRESSURE: 150 MMHG

## 2023-03-14 PROCEDURE — 99212 OFFICE O/P EST SF 10 MIN: CPT

## 2023-03-14 RX ORDER — IMIQUIMOD 50 MG/G
5 CREAM TOPICAL
Qty: 1 | Refills: 3 | Status: ACTIVE | COMMUNITY
Start: 2023-01-31 | End: 1900-01-01

## 2023-03-14 NOTE — ASSESSMENT
[FreeTextEntry1] :  In summary the patient is doing well status post local excision of 2 perianal lesions in December 2022. Pathology reveals anal condyloma and squamous cell carcinoma in situ with a positive margin.  His incisions are healed. He he is now on Aldara cream 3 times a week for 16 weeks.  There is a small subcutaneous lump in the left posterior position.  It is unclear if this is scar tissue from his excisional biopsy versus the beatings of an abscess versus recurrent neoplasm.  I will be bring him back in the office in 3 weeks or sooner if he develops worsening pain in the area.  I will see him in 3 weeks for a checkup and then every 2 to 3 months for the next year.  He is also having urinary frequency and was referred to urology

## 2023-03-14 NOTE — HISTORY OF PRESENT ILLNESS
[FreeTextEntry1] : Pravin is a 51 y/o male here for a post - op visit, s/p Excision of perianal mass x 2 on 12/21/22 for perianal squamous cell carcinoma in situ. Pathology: Perianal mass left, biopsy: Squamous cell carcinoma in situ. Perianal mass anterior midline, biopsy: Condyloma acuminate. \par \par CT Colonography 01/17/22 - Colon is visualized in its entirety from the rectum to cecum. Mild colonic diverticulosis. Ileocecal valve is visualized. Appendix is normal. No colonic polyp or mass is identified. No colonic polyp or mass. \par \par Colonoscopy from 7/16/21 demonstrates - Redundant colon and significant looping. Unable to advance scope past the ascending colon despite multiple attempts with manual pressure, changing patient to supine position and straightening / shortening the scope. No polyps identified in the entire examined portion of the colon. No specimens collected.\par \par Last seen on 2/14/23 - Rectal :. Perianal inspection reveals nearly healed incisions. There is no evidence of recurrent or residual lesion. A small amount of granulation tissue in the anterior midline was cauterized with silver nitrate. There is no sign of infection. Anal rectal tone is normal.\par \par Today pt reports no pain, did have some soreness in area a week ago but has now resolved. Daily BMs, formed, denies pain, no bleeding, no episodes of incontinence but does have some mucus leakage- sometimes has gauze in place, increasing urgency to void, and denies feeling swollen or prolapsed tissue. Not taking any anticoagulants. \par

## 2023-03-14 NOTE — PHYSICAL EXAM
[de-identified] : Perianal inspection reveals healed incisions.  There is mild tenderness and a 3 mm subcutaneous lump in the left posterior position.  There is no fluctuance cellulitis and incisions are otherwise healed.

## 2023-04-04 ENCOUNTER — APPOINTMENT (OUTPATIENT)
Dept: SURGERY | Facility: CLINIC | Age: 53
End: 2023-04-04
Payer: MEDICAID

## 2023-04-04 VITALS
HEART RATE: 85 BPM | DIASTOLIC BLOOD PRESSURE: 90 MMHG | SYSTOLIC BLOOD PRESSURE: 132 MMHG | OXYGEN SATURATION: 99 % | HEIGHT: 75 IN | WEIGHT: 243 LBS | TEMPERATURE: 97.2 F | RESPIRATION RATE: 16 BRPM | BODY MASS INDEX: 30.21 KG/M2

## 2023-04-04 DIAGNOSIS — Z09 ENCOUNTER FOR FOLLOW-UP EXAMINATION AFTER COMPLETED TREATMENT FOR CONDITIONS OTHER THAN MALIGNANT NEOPLASM: ICD-10-CM

## 2023-04-04 PROCEDURE — 99212 OFFICE O/P EST SF 10 MIN: CPT

## 2023-04-04 NOTE — HISTORY OF PRESENT ILLNESS
[FreeTextEntry1] : Pravin is a 53 y/o male here for a post - op visit, s/p Excision of perianal mass x 2 on 12/21/22 for perianal squamous cell carcinoma in situ. Pathology: Perianal mass left, biopsy: Squamous cell carcinoma in situ. Perianal mass anterior midline, biopsy: Condyloma acuminate. \par \par CT Colonography 01/17/22 - Colon is visualized in its entirety from the rectum to cecum. Mild colonic diverticulosis. Ileocecal valve is visualized. Appendix is normal. No colonic polyp or mass is identified. No colonic polyp or mass. \par \par Colonoscopy from 7/16/21 demonstrates - Redundant colon and significant looping. Unable to advance scope past the ascending colon despite multiple attempts with manual pressure, changing patient to supine position and straightening / shortening the scope. No polyps identified in the entire examined portion of the colon. No specimens collected.\par \par Last seen on 03/14/23- Rectal :. Perianal inspection reveals healed incisions. There is mild tenderness and a 3 mm subcutaneous lump in the left posterior position. There is no fluctuance cellulitis and incisions are otherwise healed. \par  \par In summary the patient is doing well status post local excision of 2 perianal lesions in December 2022. Pathology reveals anal condyloma and squamous cell carcinoma in situ with a positive margin. His incisions are healed. He he is now on Aldara cream 3 times a week for 16 weeks. There is a small subcutaneous lump in the left posterior position. It is unclear if this is scar tissue from his excisional biopsy versus the beatings of an abscess versus recurrent neoplasm. I will be bring him back in the office in 3 weeks or sooner if he develops worsening pain in the area. I will see him in 3 weeks for a checkup and then every 2 to 3 months for the next year. He is also having urinary frequency and was referred to urology. \par \par Today pt reports no pain but slight tenderness.  Daily BMs, formed, denies pain, no bleeding, no episodes of incontinence.   Does have urgency to void decreasing slowly, will call his Urologist to make the appt.  Denies feeling swollen or prolapsed tissue. Not taking any anticoagulants. \par \par \par

## 2023-04-04 NOTE — PHYSICAL EXAM
[de-identified] : Perianal inspection reveals healed incisions in the left posterior and anterior positions.  There is no residual/recurrent perianal lesion.  There is no fissure fistula or abscess.

## 2023-04-04 NOTE — ASSESSMENT
[FreeTextEntry1] : In summary the patient is doing well status post excision of perianal squamous cell carcinoma in situ in December 2022.  His incisions are healed.  There is no evidence of residual or recurrent lesion.  He will continue Aldara to complete a 16-week course.  I will see him in 2 months for a checkup.

## 2023-05-01 ENCOUNTER — RX RENEWAL (OUTPATIENT)
Age: 53
End: 2023-05-01

## 2023-05-04 ENCOUNTER — NON-APPOINTMENT (OUTPATIENT)
Age: 53
End: 2023-05-04

## 2023-05-05 ENCOUNTER — APPOINTMENT (OUTPATIENT)
Dept: INFECTIOUS DISEASE | Facility: CLINIC | Age: 53
End: 2023-05-05
Payer: MEDICAID

## 2023-05-05 VITALS
HEIGHT: 75 IN | TEMPERATURE: 97.8 F | HEART RATE: 80 BPM | DIASTOLIC BLOOD PRESSURE: 84 MMHG | SYSTOLIC BLOOD PRESSURE: 143 MMHG | BODY MASS INDEX: 30.46 KG/M2 | WEIGHT: 245 LBS | OXYGEN SATURATION: 98 %

## 2023-05-05 LAB
HCT VFR BLD CALC: 37.5 %
HGB BLD-MCNC: 12.6 G/DL
MCHC RBC-ENTMCNC: 29.3 PG
MCHC RBC-ENTMCNC: 33.6 GM/DL
MCV RBC AUTO: 87.2 FL
PLATELET # BLD AUTO: 229 K/UL
RBC # BLD: 4.3 M/UL
RBC # FLD: 13.9 %
WBC # FLD AUTO: 3.91 K/UL

## 2023-05-05 PROCEDURE — 99213 OFFICE O/P EST LOW 20 MIN: CPT

## 2023-05-05 NOTE — HISTORY OF PRESENT ILLNESS
[FreeTextEntry1] : 5/5/23----VENU SANDOVAL is a 52 year old male being seen for a follow-up visit. Here for 6 month visit. .\par Diagnosed with AIDS in 2008. Now Cd4 over 500. Undetrectable VL. \par Followed by Dr. 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 He is doing well. \par recently went to dental 2020 and optho. \par went to ENT and pulmonology and [facility] for lipids and HTN. \par updated meds list 5/7/2021---Doing well on Genvoya, Amlodipine 5mg Pt also had recent stress test . Pt to continue the Lisinopril-Hydrochlorothiazide 20-12.5 MG Oral Tablet and Vit D3 as well. rosuvastatin 10 mg from Dr. Jordy Peraza and famotidine 40mg daily . \par No family Hx of Colon Ca. \par Seen by Dr. Craig and doing well. \par Had the colonoscopy , all well, has follow up. \par had pfizer covid vaccine\par \par Pt states familal history of Multiple myloma. \par Sexual History: The patient is sexually active.\par \par plan 5/5/23: \par 1) continue Genvoya and all meds\par 2) labs today see in 6 months\par 3) continue follow up with cards and colorectal as scheduled \par \par Active Problems\par Abnormal stress test (794.39) (R94.39)\par Acid reflux (530.81) (K21.9)\par AIDS (042) (B20)\par Allergic rhinitis (477.9) (J30.9)\par Anemia (285.9) (D64.9)\par Anxiety (300.00) (F41.9)\par Cardiac arrhythmia (427.9) (I49.9)\par Creatinine elevation (790.99) (R79.89)\par Decreased libido (799.81) (R68.82)\par Degenerative joint disease of both hips (715.95) (M16.0)\par Deviated septum (470) (J34.2)\par Educated about COVID-19 virus infection (V65.49) (Z71.89)\par 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)\par Hyperlipidemia, mild (272.4) (E78.5)\par Hypertension (401.9) (I10)\par Insomnia (780.52) (G47.00)\par Intermittent memory loss (780.93) (R41.3)\par Low testosterone (790.99) (R79.89)\par Need for Menactra vaccination (V03.89) (Z23)\par Neuropathy involving both lower extremities (356.9) (G57.93)\par CLARI (obstructive sleep apnea) (327.23) (G47.33)\par Osteoarthritis of both hips (715.95) (M16.0)\par Preop testing (V72.84) (Z01.818)\par Preop testing (V72.84) (Z01.818)\par Pre-procedural laboratory examination (V72.63) (Z01.812)\par Prostate cancer screening (V76.44) (Z12.5)\par Rash (782.1) (R21)\par Scoliosis (737.30) (M41.9)\par Shortness of breath on exertion (786.05) (R06.02)\par Sickle cell trait (282.5) (D57.3)\par Skin rash (782.1) (R21)\par Sleep apnea (780.57) (G47.30)\par Vitamin D deficiency (268.9) (E55.9)\par \par Past Medical History\par History of blood transfusion (V15.89) (Z92.89)\par History of candidiasis of mouth (V12.09) (Z86.19)\par History of complete eye exam (V15.89) (Z92.89)\par History of dental examination (V15.89) (Z92.89)\par History of gonorrhea (V12.09) (Z86.19)\par History of hemorrhoids (V13.89) (Z87.19)\par History of pneumocystis pneumonia (V12.61) (Z87.01)\par History of Hypophosphatemia (275.3) (E83.39)\par History of Perirectal ulcer (569.41) (K62.6)\par History of Walking pneumonia (486) (J18.9)\par \par Current Meds\par amLODIPine Besylate 5 MG Oral Tablet; TAKE ONE TABLET BY MOUTH DAILY\par Azelastine HCl - 0.1 % Nasal Solution; INHALE 2 SQUIRT Twice daily\par Diclofenac Sodium 75 MG Oral Tablet Delayed Release; Take 1 tablet twice daily\par Famotidine 40 MG Oral Tablet; TAKE 1 TABLET AT BEDTIME\par Genvoya [phone] MG Oral Tablet; TAKE 1 TABLET BY MOUTH DAILY\par Lisinopril-hydroCHLOROthiazide 20-12.5 MG Oral Tablet; TAKE ONE TABLET BY MOUTH\par DAILY\par Rosuvastatin Calcium 10 MG Oral Tablet; TAKE 1 TABLET DAILY\par Vitamin D (Ergocalciferol) 1.25 MG (62260 UT) Oral Capsule; TAKE 1 CAPSULE BY\par MOUTH EVERY 2 WEEKS\par \par Allergies\par No Known Drug Allergies\par Animal dander - Cats\par Mold\par

## 2023-05-07 LAB
25(OH)D3 SERPL-MCNC: 46.8 NG/ML
ALBUMIN SERPL ELPH-MCNC: 4.7 G/DL
ALP BLD-CCNC: 59 U/L
ALT SERPL-CCNC: 37 U/L
ANION GAP SERPL CALC-SCNC: 12 MMOL/L
APPEARANCE: ABNORMAL
AST SERPL-CCNC: 32 U/L
BACTERIA: NEGATIVE /HPF
BILIRUB SERPL-MCNC: 0.4 MG/DL
BILIRUBIN URINE: NEGATIVE
BLOOD URINE: NEGATIVE
BUN SERPL-MCNC: 7 MG/DL
C TRACH RRNA SPEC QL NAA+PROBE: NOT DETECTED
CALCIUM SERPL-MCNC: 9.7 MG/DL
CAST: 1 /LPF
CD3 CELLS # BLD: 1118 CELLS/UL
CD3 CELLS NFR BLD: 68 %
CD3+CD4+ CELLS # BLD: 405 CELLS/UL
CD3+CD4+ CELLS NFR BLD: 25 %
CD3+CD4+ CELLS/CD3+CD8+ CLL SPEC: 0.6 RATIO
CD3+CD8+ CELLS # SPEC: 677 CELLS/UL
CD3+CD8+ CELLS NFR BLD: 41 %
CHLORIDE SERPL-SCNC: 106 MMOL/L
CHOLEST SERPL-MCNC: 162 MG/DL
CO2 SERPL-SCNC: 25 MMOL/L
COLOR: YELLOW
CREAT SERPL-MCNC: 1.23 MG/DL
EGFR: 71 ML/MIN/1.73M2
EPITHELIAL CELLS: 0 /HPF
GLUCOSE QUALITATIVE U: NEGATIVE MG/DL
GLUCOSE SERPL-MCNC: 110 MG/DL
HDLC SERPL-MCNC: 43 MG/DL
HIV1 RNA # SERPL NAA+PROBE: NORMAL
HIV1 RNA # SERPL NAA+PROBE: NORMAL COPIES/ML
KETONES URINE: NEGATIVE MG/DL
LDLC SERPL CALC-MCNC: 81 MG/DL
LEUKOCYTE ESTERASE URINE: ABNORMAL
MICROSCOPIC-UA: NORMAL
N GONORRHOEA RRNA SPEC QL NAA+PROBE: NOT DETECTED
NITRITE URINE: NEGATIVE
NONHDLC SERPL-MCNC: 119 MG/DL
PH URINE: 6.5
POTASSIUM SERPL-SCNC: 3.9 MMOL/L
PROT SERPL-MCNC: 7.5 G/DL
PROTEIN URINE: 30 MG/DL
PSA SERPL-MCNC: 3.5 NG/ML
RED BLOOD CELLS URINE: 0 /HPF
SODIUM SERPL-SCNC: 143 MMOL/L
SOURCE AMPLIFICATION: NORMAL
SOURCE ANAL: NORMAL
SOURCE ORAL: NORMAL
SPECIFIC GRAVITY URINE: 1.02
T PALLIDUM AB SER QL IA: NEGATIVE
TRIGL SERPL-MCNC: 187 MG/DL
UROBILINOGEN URINE: 0.2 MG/DL
VIRAL LOAD INTERP: NORMAL
VIRAL LOAD LOG: NORMAL LG COP/ML
WHITE BLOOD CELLS URINE: 1 /HPF

## 2023-05-19 ENCOUNTER — APPOINTMENT (OUTPATIENT)
Dept: CARDIOLOGY | Facility: CLINIC | Age: 53
End: 2023-05-19
Payer: MEDICAID

## 2023-05-19 PROCEDURE — 99215 OFFICE O/P EST HI 40 MIN: CPT | Mod: 95

## 2023-06-06 ENCOUNTER — APPOINTMENT (OUTPATIENT)
Dept: SURGERY | Facility: CLINIC | Age: 53
End: 2023-06-06
Payer: MEDICAID

## 2023-06-06 VITALS
SYSTOLIC BLOOD PRESSURE: 140 MMHG | RESPIRATION RATE: 17 BRPM | DIASTOLIC BLOOD PRESSURE: 86 MMHG | OXYGEN SATURATION: 98 % | HEART RATE: 93 BPM | TEMPERATURE: 97.1 F

## 2023-06-06 PROCEDURE — 99213 OFFICE O/P EST LOW 20 MIN: CPT

## 2023-06-06 NOTE — ASSESSMENT
[FreeTextEntry1] : In summary the patient has history of squamous cell carcinoma in situ excised 6 months ago.  He is finishing his course of Aldara.  For the past several days he has had perianal pain and discharge.  Examination reveals multiple tender perianal ulcers.  I suspect this is viral/infectious in etiology and referred him to infectious disease for further evaluation.  His incisions have healed.  I should see him again in the office in 3 months for surveillance exam.  I also told him to discontinue Aldara in case this is a reaction to topical treatment.

## 2023-06-06 NOTE — PHYSICAL EXAM
[de-identified] : Perianal inspection reveals multiple new tender punctate perianal lesions/ulcers approximately 3 to 5 mm in diameter  His incisions are healed without evidence of mass.

## 2023-06-06 NOTE — HISTORY OF PRESENT ILLNESS
[FreeTextEntry1] : Pravin is a 51 y/o male here for 2 month follow up visit, s/p Excision of perianal mass x 2 on 12/21/22 for perianal squamous cell carcinoma in situ. Pathology: Perianal mass left, biopsy: Squamous cell carcinoma in situ. Perianal mass anterior midline, biopsy: Condyloma acuminate. \par \par CT Colonography 01/17/22 - Colon is visualized in its entirety from the rectum to cecum. Mild colonic diverticulosis. Ileocecal valve is visualized. Appendix is normal. No colonic polyp or mass is identified. No colonic polyp or mass. \par \par Colonoscopy from 7/16/21 demonstrates - Redundant colon and significant looping. Unable to advance scope past the ascending colon despite multiple attempts with manual pressure, changing patient to supine position and straightening / shortening the scope. No polyps identified in the entire examined portion of the colon. No specimens collected.\par \par Last seen 4/4/2023 - the patient is doing well status post excision of perianal squamous cell carcinoma in situ in December 2022. His incisions are healed. There is no evidence of residual or recurrent lesion. He will continue Aldara to complete a 16-week course. I will see him in 2 months for a checkup. \par \par Today pt reports soreness of anal area since last week. Daily BMs, formed, no straining, denies pain, does have on-and-off drainage in area (keeping tp), no bleeding, no episodes of incontinence, and does feel little swelling of incision area. Denies fevers or chills. Not taking any anticoagulants.\par \par \par \par

## 2023-06-15 DIAGNOSIS — B00.9 HERPESVIRAL INFECTION, UNSPECIFIED: ICD-10-CM

## 2023-06-15 RX ORDER — VALACYCLOVIR 1 G/1
1 TABLET, FILM COATED ORAL
Qty: 14 | Refills: 0 | Status: ACTIVE | COMMUNITY
Start: 2023-06-15 | End: 1900-01-01

## 2023-06-19 ENCOUNTER — APPOINTMENT (OUTPATIENT)
Dept: INFECTIOUS DISEASE | Facility: CLINIC | Age: 53
End: 2023-06-19
Payer: MEDICAID

## 2023-06-19 VITALS
TEMPERATURE: 97.7 F | SYSTOLIC BLOOD PRESSURE: 149 MMHG | HEART RATE: 93 BPM | BODY MASS INDEX: 29.47 KG/M2 | DIASTOLIC BLOOD PRESSURE: 88 MMHG | WEIGHT: 237 LBS | OXYGEN SATURATION: 100 % | HEIGHT: 75 IN

## 2023-06-19 LAB
25(OH)D3 SERPL-MCNC: 54.4 NG/ML
ALBUMIN SERPL ELPH-MCNC: 5 G/DL
ALP BLD-CCNC: 57 U/L
ALT SERPL-CCNC: 30 U/L
ANION GAP SERPL CALC-SCNC: 9 MMOL/L
APPEARANCE: CLEAR
AST SERPL-CCNC: 26 U/L
BACTERIA: NEGATIVE /HPF
BILIRUB SERPL-MCNC: 0.5 MG/DL
BILIRUBIN URINE: NEGATIVE
BLOOD URINE: NEGATIVE
BUN SERPL-MCNC: 10 MG/DL
CALCIUM SERPL-MCNC: 10 MG/DL
CAST: 0 /LPF
CD3 CELLS # BLD: 1363 CELLS/UL
CD3 CELLS NFR BLD: 66 %
CD3+CD4+ CELLS # BLD: 505 CELLS/UL
CD3+CD4+ CELLS NFR BLD: 25 %
CD3+CD4+ CELLS/CD3+CD8+ CLL SPEC: 0.63 RATIO
CD3+CD8+ CELLS # SPEC: 799 CELLS/UL
CD3+CD8+ CELLS NFR BLD: 39 %
CHLORIDE SERPL-SCNC: 107 MMOL/L
CHOLEST SERPL-MCNC: 155 MG/DL
CO2 SERPL-SCNC: 28 MMOL/L
COLOR: YELLOW
CREAT SERPL-MCNC: 1.3 MG/DL
EGFR: 66 ML/MIN/1.73M2
EPITHELIAL CELLS: 1 /HPF
GLUCOSE QUALITATIVE U: NEGATIVE MG/DL
GLUCOSE SERPL-MCNC: 95 MG/DL
HBV SURFACE AG SER QL: NONREACTIVE
HCT VFR BLD CALC: 38.4 %
HCV AB SER QL: NONREACTIVE
HCV S/CO RATIO: 0.11 S/CO
HDLC SERPL-MCNC: 48 MG/DL
HGB BLD-MCNC: 12.8 G/DL
KETONES URINE: NEGATIVE MG/DL
LDLC SERPL CALC-MCNC: 83 MG/DL
LEUKOCYTE ESTERASE URINE: NEGATIVE
MCHC RBC-ENTMCNC: 29.2 PG
MCHC RBC-ENTMCNC: 33.3 GM/DL
MCV RBC AUTO: 87.7 FL
MICROSCOPIC-UA: NORMAL
NITRITE URINE: NEGATIVE
NONHDLC SERPL-MCNC: 107 MG/DL
PH URINE: 6
PLATELET # BLD AUTO: 224 K/UL
POTASSIUM SERPL-SCNC: 3.6 MMOL/L
PROT SERPL-MCNC: 8 G/DL
PROTEIN URINE: 30 MG/DL
RBC # BLD: 4.38 M/UL
RBC # FLD: 14.3 %
RED BLOOD CELLS URINE: 0 /HPF
SODIUM SERPL-SCNC: 144 MMOL/L
SPECIFIC GRAVITY URINE: 1.01
TRIGL SERPL-MCNC: 122 MG/DL
UROBILINOGEN URINE: 0.2 MG/DL
WBC # FLD AUTO: 4.59 K/UL
WHITE BLOOD CELLS URINE: 0 /HPF

## 2023-06-19 PROCEDURE — 99214 OFFICE O/P EST MOD 30 MIN: CPT

## 2023-06-19 NOTE — PHYSICAL EXAM
[General Appearance - Alert] : alert [General Appearance - In No Acute Distress] : in no acute distress [Sclera] : the sclera and conjunctiva were normal [PERRL With Normal Accommodation] : pupils were equal in size, round, reactive to light [Extraocular Movements] : extraocular movements were intact [Oropharynx] : the oropharynx was normal with no thrush [Outer Ear] : the ears and nose were normal in appearance [Neck Appearance] : the appearance of the neck was normal [Neck Cervical Mass (___cm)] : no neck mass was observed [Jugular Venous Distention Increased] : there was no jugular-venous distention [Thyroid Diffuse Enlargement] : the thyroid was not enlarged [Auscultation Breath Sounds / Voice Sounds] : lungs were clear to auscultation bilaterally [Heart Rate And Rhythm] : heart rate was normal and rhythm regular [Heart Sounds] : normal S1 and S2 [Heart Sounds Gallop] : no gallops [Murmurs] : no murmurs [Heart Sounds Pericardial Friction Rub] : no pericardial rub [Full Pulse] : the pedal pulses are present [Edema] : there was no peripheral edema [Bowel Sounds] : normal bowel sounds [Abdomen Soft] : soft [Abdomen Tenderness] : non-tender [Abdomen Mass (___ Cm)] : no abdominal mass palpated [Costovertebral Angle Tenderness] : no CVA tenderness [No Palpable Adenopathy] : no palpable adenopathy [Musculoskeletal - Swelling] : no joint swelling [Nail Clubbing] : no clubbing  or cyanosis of the fingernails [Motor Tone] : muscle strength and tone were normal [Skin Color & Pigmentation] : normal skin color and pigmentation [] : no rash [Oriented To Time, Place, And Person] : oriented to person, place, and time [Affect] : the affect was normal

## 2023-06-19 NOTE — HISTORY OF PRESENT ILLNESS
[FreeTextEntry1] : \par 6/19/23----VENU SANDOVAL is a 52 year old male being seen for a follow-up visit. Here for 6 month visit. .\par Diagnosed with AIDS in 2008. Now Cd4 over 500. Undetrectable VL. \par Followed by Dr. 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 He is doing well. \par recently went to dental 2020 and optho. \par went to ENT and pulmonology and Jordy Peraza for lipids and HTN. \par updated meds list 5/7/2021---Doing well on Genvoya, Amlodipine 5mg Pt also had recent stress test . Pt to continue the Lisinopril-Hydrochlorothiazide 20-12.5 MG Oral Tablet and Vit D3 as well. rosuvastatin 10 mg from Dr. Jordy Peraza and famotidine 40mg daily . \par No family Hx of Colon Ca. \par Seen by Dr. Craig and doing well. \par Had the colonoscopy , all well, has follow up. \par had pfizer covid vaccine\par Pt states familal history of Multiple myloma. \par Sexual History: The patient is sexually active.\par \par plan 6/19/2023: \par 1) continue Genvoya and all meds\par 2) labs today see in 6 months\par 3) Rectal area looks good all lesions healed colorectal as scheduled \par \par Active Problems\par Abnormal stress test (794.39) (R94.39)\par Acid reflux (530.81) (K21.9)\par AIDS (042) (B20)\par Allergic rhinitis (477.9) (J30.9)\par Anemia (285.9) (D64.9)\par Anxiety (300.00) (F41.9)\par Cardiac arrhythmia (427.9) (I49.9)\par Creatinine elevation (790.99) (R79.89)\par Decreased libido (799.81) (R68.82)\par Degenerative joint disease of both hips (715.95) (M16.0)\par Deviated septum (470) (J34.2)\par Educated about COVID-19 virus infection (V65.49) (Z71.89)\par 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)\par Hyperlipidemia, mild (272.4) (E78.5)\par Hypertension (401.9) (I10)\par Insomnia (780.52) (G47.00)\par Intermittent memory loss (780.93) (R41.3)\par Low testosterone (790.99) (R79.89)\par Need for Menactra vaccination (V03.89) (Z23)\par Neuropathy involving both lower extremities (356.9) (G57.93)\par CLARI (obstructive sleep apnea) (327.23) (G47.33)\par Osteoarthritis of both hips (715.95) (M16.0)\par Preop testing (V72.84) (Z01.818)\par Preop testing (V72.84) (Z01.818)\par Pre-procedural laboratory examination (V72.63) (Z01.812)\par Prostate cancer screening (V76.44) (Z12.5)\par Rash (782.1) (R21)\par Scoliosis (737.30) (M41.9)\par Shortness of breath on exertion (786.05) (R06.02)\par Sickle cell trait (282.5) (D57.3)\par Skin rash (782.1) (R21)\par Sleep apnea (780.57) (G47.30)\par Vitamin D deficiency (268.9) (E55.9)\par \par Past Medical History\par History of blood transfusion (V15.89) (Z92.89)\par History of candidiasis of mouth (V12.09) (Z86.19)\par History of complete eye exam (V15.89) (Z92.89)\par History of dental examination (V15.89) (Z92.89)\par History of gonorrhea (V12.09) (Z86.19)\par History of hemorrhoids (V13.89) (Z87.19)\par History of pneumocystis pneumonia (V12.61) (Z87.01)\par History of Hypophosphatemia (275.3) (E83.39)\par History of Perirectal ulcer (569.41) (K62.6)\par History of Walking pneumonia (486) (J18.9)\par \par Current Meds\par amLODIPine Besylate 5 MG Oral Tablet; TAKE ONE TABLET BY MOUTH DAILY\par Azelastine HCl - 0.1 % Nasal Solution; INHALE 2 SQUIRT Twice daily\par Diclofenac Sodium 75 MG Oral Tablet Delayed Release; Take 1 tablet twice daily\par Famotidine 40 MG Oral Tablet; TAKE 1 TABLET AT BEDTIME\par Genvoya 429-361-473-10 MG Oral Tablet; TAKE 1 TABLET BY MOUTH DAILY\par Lisinopril-hydroCHLOROthiazide 20-12.5 MG Oral Tablet; TAKE ONE TABLET BY MOUTH\par DAILY\par Rosuvastatin Calcium 10 MG Oral Tablet; TAKE 1 TABLET DAILY\par Vitamin D (Ergocalciferol) 1.25 MG (49527 UT) Oral Capsule; TAKE 1 CAPSULE BY\par MOUTH EVERY 2 WEEKS\par \par Allergies\par No Known Drug Allergies\par Animal dander - Cats\par Mold\par \par

## 2023-06-19 NOTE — REASON FOR VISIT
· Patient currently AV paced for bradycardia  Follows with Cardiology outpatient  Cardiology would like to start patient on anticoagulation however not cleared by Neurosurgery at this time due to stable intracranial hemorrhage  · Patient received 1 dose of aspirin upon admission however this will be held  Hold all blood thinners and anti-platelet  DVT prophylaxis mechanical only  · Patient takes metoprolol 25, amlodipine 5 and Elanapril 5  [Follow-Up: _____] : a [unfilled] follow-up visit

## 2023-06-20 LAB
C TRACH RRNA SPEC QL NAA+PROBE: NOT DETECTED
C TRACH RRNA SPEC QL NAA+PROBE: NOT DETECTED
HIV1 RNA # SERPL NAA+PROBE: NORMAL
HIV1 RNA # SERPL NAA+PROBE: NORMAL COPIES/ML
HSV+VZV DNA SPEC QL NAA+PROBE: NOT DETECTED
N GONORRHOEA RRNA SPEC QL NAA+PROBE: NOT DETECTED
N GONORRHOEA RRNA SPEC QL NAA+PROBE: NOT DETECTED
SOURCE AMPLIFICATION: NORMAL
SOURCE ORAL: NORMAL
SPECIMEN SOURCE: NORMAL
T PALLIDUM AB SER QL IA: NEGATIVE
VIRAL LOAD INTERP: NORMAL
VIRAL LOAD LOG: NORMAL LG COP/ML

## 2023-06-22 LAB — BACTERIA GENITAL AEROBE CULT: ABNORMAL

## 2023-06-27 ENCOUNTER — RX RENEWAL (OUTPATIENT)
Age: 53
End: 2023-06-27

## 2023-06-29 LAB — VIRAL CULTURE, GENERAL: NORMAL

## 2023-06-30 ENCOUNTER — NON-APPOINTMENT (OUTPATIENT)
Age: 53
End: 2023-06-30

## 2023-06-30 DIAGNOSIS — M54.9 DORSALGIA, UNSPECIFIED: ICD-10-CM

## 2023-06-30 DIAGNOSIS — G89.29 DORSALGIA, UNSPECIFIED: ICD-10-CM

## 2023-06-30 DIAGNOSIS — M20.41 OTHER HAMMER TOE(S) (ACQUIRED), RIGHT FOOT: ICD-10-CM

## 2023-07-07 ENCOUNTER — APPOINTMENT (OUTPATIENT)
Dept: CARDIOLOGY | Facility: CLINIC | Age: 53
End: 2023-07-07
Payer: MEDICAID

## 2023-07-07 ENCOUNTER — NON-APPOINTMENT (OUTPATIENT)
Age: 53
End: 2023-07-07

## 2023-07-07 PROCEDURE — 99214 OFFICE O/P EST MOD 30 MIN: CPT | Mod: 95

## 2023-07-24 ENCOUNTER — APPOINTMENT (OUTPATIENT)
Dept: ORTHOPEDIC SURGERY | Facility: CLINIC | Age: 53
End: 2023-07-24
Payer: MEDICAID

## 2023-07-24 VITALS
TEMPERATURE: 97.9 F | DIASTOLIC BLOOD PRESSURE: 96 MMHG | HEART RATE: 87 BPM | HEIGHT: 75 IN | SYSTOLIC BLOOD PRESSURE: 150 MMHG | BODY MASS INDEX: 28.97 KG/M2 | WEIGHT: 233 LBS | OXYGEN SATURATION: 99 %

## 2023-07-24 DIAGNOSIS — M60.9 MYOSITIS, UNSPECIFIED: ICD-10-CM

## 2023-07-24 DIAGNOSIS — M51.34 OTHER INTERVERTEBRAL DISC DEGENERATION, THORACIC REGION: ICD-10-CM

## 2023-07-24 DIAGNOSIS — M79.10 MYALGIA, UNSPECIFIED SITE: ICD-10-CM

## 2023-07-24 PROCEDURE — 72070 X-RAY EXAM THORAC SPINE 2VWS: CPT

## 2023-07-24 PROCEDURE — 20552 NJX 1/MLT TRIGGER POINT 1/2: CPT

## 2023-07-24 PROCEDURE — 72100 X-RAY EXAM L-S SPINE 2/3 VWS: CPT

## 2023-07-24 PROCEDURE — 99214 OFFICE O/P EST MOD 30 MIN: CPT | Mod: 25

## 2023-08-21 ENCOUNTER — RX RENEWAL (OUTPATIENT)
Age: 53
End: 2023-08-21

## 2023-09-08 ENCOUNTER — APPOINTMENT (OUTPATIENT)
Dept: CARDIOLOGY | Facility: CLINIC | Age: 53
End: 2023-09-08
Payer: MEDICAID

## 2023-09-08 VITALS — HEIGHT: 75 IN | BODY MASS INDEX: 28.47 KG/M2 | WEIGHT: 229 LBS

## 2023-09-08 PROCEDURE — 99214 OFFICE O/P EST MOD 30 MIN: CPT | Mod: 95

## 2023-09-17 ENCOUNTER — NON-APPOINTMENT (OUTPATIENT)
Age: 53
End: 2023-09-17

## 2023-09-20 ENCOUNTER — APPOINTMENT (OUTPATIENT)
Dept: CARDIOLOGY | Facility: CLINIC | Age: 53
End: 2023-09-20

## 2023-09-25 ENCOUNTER — RX RENEWAL (OUTPATIENT)
Age: 53
End: 2023-09-25

## 2023-10-17 ENCOUNTER — OUTPATIENT (OUTPATIENT)
Dept: OUTPATIENT SERVICES | Facility: HOSPITAL | Age: 53
LOS: 1 days | End: 2023-10-17
Payer: MEDICAID

## 2023-10-17 VITALS
HEART RATE: 79 BPM | SYSTOLIC BLOOD PRESSURE: 121 MMHG | TEMPERATURE: 98 F | RESPIRATION RATE: 16 BRPM | HEIGHT: 75 IN | DIASTOLIC BLOOD PRESSURE: 78 MMHG | WEIGHT: 238.1 LBS | OXYGEN SATURATION: 99 %

## 2023-10-17 DIAGNOSIS — M20.41 OTHER HAMMER TOE(S) (ACQUIRED), RIGHT FOOT: Chronic | ICD-10-CM

## 2023-10-17 DIAGNOSIS — B20 HUMAN IMMUNODEFICIENCY VIRUS [HIV] DISEASE: ICD-10-CM

## 2023-10-17 DIAGNOSIS — E78.5 HYPERLIPIDEMIA, UNSPECIFIED: ICD-10-CM

## 2023-10-17 DIAGNOSIS — M79.674 PAIN IN RIGHT TOE(S): ICD-10-CM

## 2023-10-17 DIAGNOSIS — G47.33 OBSTRUCTIVE SLEEP APNEA (ADULT) (PEDIATRIC): Chronic | ICD-10-CM

## 2023-10-17 DIAGNOSIS — I10 ESSENTIAL (PRIMARY) HYPERTENSION: ICD-10-CM

## 2023-10-17 DIAGNOSIS — G47.33 OBSTRUCTIVE SLEEP APNEA (ADULT) (PEDIATRIC): ICD-10-CM

## 2023-10-17 DIAGNOSIS — R73.03 PREDIABETES: ICD-10-CM

## 2023-10-17 DIAGNOSIS — M20.41 OTHER HAMMER TOE(S) (ACQUIRED), RIGHT FOOT: ICD-10-CM

## 2023-10-17 DIAGNOSIS — Z98.890 OTHER SPECIFIED POSTPROCEDURAL STATES: Chronic | ICD-10-CM

## 2023-10-17 LAB
A1C WITH ESTIMATED AVERAGE GLUCOSE RESULT: 5.1 % — SIGNIFICANT CHANGE UP (ref 4–5.6)
A1C WITH ESTIMATED AVERAGE GLUCOSE RESULT: 5.1 % — SIGNIFICANT CHANGE UP (ref 4–5.6)
ALBUMIN SERPL ELPH-MCNC: 5 G/DL — SIGNIFICANT CHANGE UP (ref 3.3–5)
ALBUMIN SERPL ELPH-MCNC: 5 G/DL — SIGNIFICANT CHANGE UP (ref 3.3–5)
ALP SERPL-CCNC: 54 U/L — SIGNIFICANT CHANGE UP (ref 40–120)
ALP SERPL-CCNC: 54 U/L — SIGNIFICANT CHANGE UP (ref 40–120)
ALT FLD-CCNC: 36 U/L — SIGNIFICANT CHANGE UP (ref 4–41)
ALT FLD-CCNC: 36 U/L — SIGNIFICANT CHANGE UP (ref 4–41)
ANION GAP SERPL CALC-SCNC: 9 MMOL/L — SIGNIFICANT CHANGE UP (ref 7–14)
ANION GAP SERPL CALC-SCNC: 9 MMOL/L — SIGNIFICANT CHANGE UP (ref 7–14)
AST SERPL-CCNC: 29 U/L — SIGNIFICANT CHANGE UP (ref 4–40)
AST SERPL-CCNC: 29 U/L — SIGNIFICANT CHANGE UP (ref 4–40)
BILIRUB SERPL-MCNC: 0.4 MG/DL — SIGNIFICANT CHANGE UP (ref 0.2–1.2)
BILIRUB SERPL-MCNC: 0.4 MG/DL — SIGNIFICANT CHANGE UP (ref 0.2–1.2)
BUN SERPL-MCNC: 9 MG/DL — SIGNIFICANT CHANGE UP (ref 7–23)
BUN SERPL-MCNC: 9 MG/DL — SIGNIFICANT CHANGE UP (ref 7–23)
CALCIUM SERPL-MCNC: 10.3 MG/DL — SIGNIFICANT CHANGE UP (ref 8.4–10.5)
CALCIUM SERPL-MCNC: 10.3 MG/DL — SIGNIFICANT CHANGE UP (ref 8.4–10.5)
CHLORIDE SERPL-SCNC: 105 MMOL/L — SIGNIFICANT CHANGE UP (ref 98–107)
CHLORIDE SERPL-SCNC: 105 MMOL/L — SIGNIFICANT CHANGE UP (ref 98–107)
CO2 SERPL-SCNC: 29 MMOL/L — SIGNIFICANT CHANGE UP (ref 22–31)
CO2 SERPL-SCNC: 29 MMOL/L — SIGNIFICANT CHANGE UP (ref 22–31)
CREAT SERPL-MCNC: 1.33 MG/DL — HIGH (ref 0.5–1.3)
CREAT SERPL-MCNC: 1.33 MG/DL — HIGH (ref 0.5–1.3)
EGFR: 64 ML/MIN/1.73M2 — SIGNIFICANT CHANGE UP
EGFR: 64 ML/MIN/1.73M2 — SIGNIFICANT CHANGE UP
ESTIMATED AVERAGE GLUCOSE: 100 — SIGNIFICANT CHANGE UP
ESTIMATED AVERAGE GLUCOSE: 100 — SIGNIFICANT CHANGE UP
GLUCOSE SERPL-MCNC: 81 MG/DL — SIGNIFICANT CHANGE UP (ref 70–99)
GLUCOSE SERPL-MCNC: 81 MG/DL — SIGNIFICANT CHANGE UP (ref 70–99)
HCT VFR BLD CALC: 36.5 % — LOW (ref 39–50)
HCT VFR BLD CALC: 36.5 % — LOW (ref 39–50)
HGB BLD-MCNC: 12.4 G/DL — LOW (ref 13–17)
HGB BLD-MCNC: 12.4 G/DL — LOW (ref 13–17)
MCHC RBC-ENTMCNC: 29.7 PG — SIGNIFICANT CHANGE UP (ref 27–34)
MCHC RBC-ENTMCNC: 29.7 PG — SIGNIFICANT CHANGE UP (ref 27–34)
MCHC RBC-ENTMCNC: 34 GM/DL — SIGNIFICANT CHANGE UP (ref 32–36)
MCHC RBC-ENTMCNC: 34 GM/DL — SIGNIFICANT CHANGE UP (ref 32–36)
MCV RBC AUTO: 87.3 FL — SIGNIFICANT CHANGE UP (ref 80–100)
MCV RBC AUTO: 87.3 FL — SIGNIFICANT CHANGE UP (ref 80–100)
NRBC # BLD: 0 /100 WBCS — SIGNIFICANT CHANGE UP (ref 0–0)
NRBC # BLD: 0 /100 WBCS — SIGNIFICANT CHANGE UP (ref 0–0)
NRBC # FLD: 0 K/UL — SIGNIFICANT CHANGE UP (ref 0–0)
NRBC # FLD: 0 K/UL — SIGNIFICANT CHANGE UP (ref 0–0)
PLATELET # BLD AUTO: 262 K/UL — SIGNIFICANT CHANGE UP (ref 150–400)
PLATELET # BLD AUTO: 262 K/UL — SIGNIFICANT CHANGE UP (ref 150–400)
POTASSIUM SERPL-MCNC: 3.5 MMOL/L — SIGNIFICANT CHANGE UP (ref 3.5–5.3)
POTASSIUM SERPL-MCNC: 3.5 MMOL/L — SIGNIFICANT CHANGE UP (ref 3.5–5.3)
POTASSIUM SERPL-SCNC: 3.5 MMOL/L — SIGNIFICANT CHANGE UP (ref 3.5–5.3)
POTASSIUM SERPL-SCNC: 3.5 MMOL/L — SIGNIFICANT CHANGE UP (ref 3.5–5.3)
PROT SERPL-MCNC: 7.9 G/DL — SIGNIFICANT CHANGE UP (ref 6–8.3)
PROT SERPL-MCNC: 7.9 G/DL — SIGNIFICANT CHANGE UP (ref 6–8.3)
RBC # BLD: 4.18 M/UL — LOW (ref 4.2–5.8)
RBC # BLD: 4.18 M/UL — LOW (ref 4.2–5.8)
RBC # FLD: 12.8 % — SIGNIFICANT CHANGE UP (ref 10.3–14.5)
RBC # FLD: 12.8 % — SIGNIFICANT CHANGE UP (ref 10.3–14.5)
SODIUM SERPL-SCNC: 143 MMOL/L — SIGNIFICANT CHANGE UP (ref 135–145)
SODIUM SERPL-SCNC: 143 MMOL/L — SIGNIFICANT CHANGE UP (ref 135–145)
WBC # BLD: 5.14 K/UL — SIGNIFICANT CHANGE UP (ref 3.8–10.5)
WBC # BLD: 5.14 K/UL — SIGNIFICANT CHANGE UP (ref 3.8–10.5)
WBC # FLD AUTO: 5.14 K/UL — SIGNIFICANT CHANGE UP (ref 3.8–10.5)
WBC # FLD AUTO: 5.14 K/UL — SIGNIFICANT CHANGE UP (ref 3.8–10.5)

## 2023-10-17 PROCEDURE — 93010 ELECTROCARDIOGRAM REPORT: CPT

## 2023-10-17 RX ORDER — SEMAGLUTIDE 0.68 MG/ML
1 INJECTION, SOLUTION SUBCUTANEOUS
Qty: 0 | Refills: 0 | DISCHARGE

## 2023-10-17 RX ORDER — SODIUM CHLORIDE 9 MG/ML
1000 INJECTION, SOLUTION INTRAVENOUS
Refills: 0 | Status: DISCONTINUED | OUTPATIENT
Start: 2023-10-24 | End: 2023-11-07

## 2023-10-17 RX ORDER — EZETIMIBE AND SIMVASTATIN 10; 80 MG/1; MG/1
0 TABLET, FILM COATED ORAL
Qty: 0 | Refills: 0 | DISCHARGE

## 2023-10-17 NOTE — H&P PST ADULT - HISTORY OF PRESENT ILLNESS
53 year old male with pre op dx of  53 year old male with pre op dx of other hammer toe(s) (acquired), right foot is scheduled for right foot 2nd digit fusion.

## 2023-10-17 NOTE — H&P PST ADULT - BP NONINVASIVE DIASTOLIC (MM HG)
Ambulatory to unit room 3 for IVIIG. Reorientated to unit. Procedure and plan of care explained. Questions answered. Understanding verbalized.
78

## 2023-10-17 NOTE — H&P PST ADULT - PROBLEM SELECTOR PLAN 1
Patient tentatively scheduled for right foot 2nd digit fusion on 10/24/2023    Pre-op instructions provided. Pt given verbal and written instructions with teach back on chlorhexidine wash  and pepcid. Pt verbalized understanding with return demonstration.    CBC, CMP, HBAIC results pending

## 2023-10-17 NOTE — H&P PST ADULT - NSICDXPASTMEDICALHX_GEN_ALL_CORE_FT
PAST MEDICAL HISTORY:  High cholesterol     HIV disease     HTN (hypertension)     CLARI treated with BiPAP     Other hammer toe(s) (acquired), right foot

## 2023-10-17 NOTE — H&P PST ADULT - MUSCULOSKELETAL COMMENTS
Pre op dx- Other hammer toe(s) (acquired), right foot hx of hammer toes - right 2nd digit since last 10 years

## 2023-10-19 PROBLEM — M20.41 OTHER HAMMER TOE(S) (ACQUIRED), RIGHT FOOT: Chronic | Status: ACTIVE | Noted: 2023-10-17

## 2023-10-20 ENCOUNTER — APPOINTMENT (OUTPATIENT)
Dept: INFECTIOUS DISEASE | Facility: CLINIC | Age: 53
End: 2023-10-20
Payer: MEDICAID

## 2023-10-20 VITALS
OXYGEN SATURATION: 99 % | DIASTOLIC BLOOD PRESSURE: 93 MMHG | SYSTOLIC BLOOD PRESSURE: 137 MMHG | WEIGHT: 236 LBS | BODY MASS INDEX: 29.34 KG/M2 | HEART RATE: 86 BPM | HEIGHT: 75 IN | TEMPERATURE: 97.8 F

## 2023-10-20 DIAGNOSIS — B20 HUMAN IMMUNODEFICIENCY VIRUS [HIV] DISEASE: ICD-10-CM

## 2023-10-20 PROCEDURE — 99214 OFFICE O/P EST MOD 30 MIN: CPT

## 2023-10-22 ENCOUNTER — RX RENEWAL (OUTPATIENT)
Age: 53
End: 2023-10-22

## 2023-10-22 LAB
ALBUMIN SERPL ELPH-MCNC: 5 G/DL
ALP BLD-CCNC: 55 U/L
ALT SERPL-CCNC: 31 U/L
ANION GAP SERPL CALC-SCNC: 11 MMOL/L
AST SERPL-CCNC: 25 U/L
BILIRUB SERPL-MCNC: 0.4 MG/DL
BUN SERPL-MCNC: 10 MG/DL
C TRACH RRNA SPEC QL NAA+PROBE: NOT DETECTED
CALCIUM SERPL-MCNC: 9.7 MG/DL
CHLORIDE SERPL-SCNC: 104 MMOL/L
CHOLEST SERPL-MCNC: 168 MG/DL
CO2 SERPL-SCNC: 27 MMOL/L
CREAT SERPL-MCNC: 1.37 MG/DL
EGFR: 62 ML/MIN/1.73M2
ESTIMATED AVERAGE GLUCOSE: 105 MG/DL
GLUCOSE SERPL-MCNC: 92 MG/DL
HBA1C MFR BLD HPLC: 5.3 %
HCT VFR BLD CALC: 36.6 %
HCV AB SER QL: NONREACTIVE
HCV S/CO RATIO: 0.08 S/CO
HDLC SERPL-MCNC: 43 MG/DL
HGB BLD-MCNC: 12.2 G/DL
HIV1 RNA # SERPL NAA+PROBE: NORMAL
HIV1 RNA # SERPL NAA+PROBE: NORMAL COPIES/ML
LDLC SERPL CALC-MCNC: 101 MG/DL
MCHC RBC-ENTMCNC: 29.6 PG
MCHC RBC-ENTMCNC: 33.3 GM/DL
MCV RBC AUTO: 88.8 FL
N GONORRHOEA RRNA SPEC QL NAA+PROBE: NOT DETECTED
NONHDLC SERPL-MCNC: 125 MG/DL
PLATELET # BLD AUTO: 232 K/UL
POTASSIUM SERPL-SCNC: 3.9 MMOL/L
PROT SERPL-MCNC: 7.7 G/DL
PSA SERPL-MCNC: 4.6 NG/ML
RBC # BLD: 4.12 M/UL
RBC # FLD: 13.1 %
SODIUM SERPL-SCNC: 143 MMOL/L
SOURCE AMPLIFICATION: NORMAL
SOURCE ANAL: NORMAL
SOURCE ORAL: NORMAL
T PALLIDUM AB SER QL IA: NEGATIVE
TRIGL SERPL-MCNC: 137 MG/DL
TSH SERPL-ACNC: 1.32 UIU/ML
VIRAL LOAD INTERP: NORMAL
VIRAL LOAD LOG: NORMAL LG COP/ML
WBC # FLD AUTO: 4.35 K/UL

## 2023-10-23 ENCOUNTER — TRANSCRIPTION ENCOUNTER (OUTPATIENT)
Age: 53
End: 2023-10-23

## 2023-10-23 ENCOUNTER — RX RENEWAL (OUTPATIENT)
Age: 53
End: 2023-10-23

## 2023-10-24 ENCOUNTER — OUTPATIENT (OUTPATIENT)
Dept: OUTPATIENT SERVICES | Facility: HOSPITAL | Age: 53
LOS: 1 days | Discharge: HOME | End: 2023-10-24
Payer: MEDICAID

## 2023-10-24 ENCOUNTER — TRANSCRIPTION ENCOUNTER (OUTPATIENT)
Age: 53
End: 2023-10-24

## 2023-10-24 ENCOUNTER — RESULT REVIEW (OUTPATIENT)
Age: 53
End: 2023-10-24

## 2023-10-24 VITALS
TEMPERATURE: 98 F | HEIGHT: 75 IN | SYSTOLIC BLOOD PRESSURE: 119 MMHG | HEART RATE: 77 BPM | OXYGEN SATURATION: 100 % | RESPIRATION RATE: 12 BRPM | WEIGHT: 238.1 LBS | DIASTOLIC BLOOD PRESSURE: 73 MMHG

## 2023-10-24 VITALS
RESPIRATION RATE: 18 BRPM | HEART RATE: 66 BPM | OXYGEN SATURATION: 99 % | SYSTOLIC BLOOD PRESSURE: 112 MMHG | TEMPERATURE: 97 F | DIASTOLIC BLOOD PRESSURE: 67 MMHG

## 2023-10-24 DIAGNOSIS — Z98.890 OTHER SPECIFIED POSTPROCEDURAL STATES: Chronic | ICD-10-CM

## 2023-10-24 DIAGNOSIS — M20.41 OTHER HAMMER TOE(S) (ACQUIRED), RIGHT FOOT: Chronic | ICD-10-CM

## 2023-10-24 DIAGNOSIS — M79.674 PAIN IN RIGHT TOE(S): ICD-10-CM

## 2023-10-24 DIAGNOSIS — G47.33 OBSTRUCTIVE SLEEP APNEA (ADULT) (PEDIATRIC): Chronic | ICD-10-CM

## 2023-10-24 LAB
GLUCOSE BLDC GLUCOMTR-MCNC: 89 MG/DL — SIGNIFICANT CHANGE UP (ref 70–99)
GLUCOSE BLDC GLUCOMTR-MCNC: 89 MG/DL — SIGNIFICANT CHANGE UP (ref 70–99)

## 2023-10-24 PROCEDURE — 73630 X-RAY EXAM OF FOOT: CPT | Mod: 26,RT

## 2023-10-24 PROCEDURE — 88305 TISSUE EXAM BY PATHOLOGIST: CPT | Mod: 26

## 2023-10-24 PROCEDURE — 88342 IMHCHEM/IMCYTCHM 1ST ANTB: CPT | Mod: 26

## 2023-10-24 DEVICE — K-WIRE OSTEOMED .035" X 4": Type: IMPLANTABLE DEVICE | Site: LEFT, RIGHT | Status: FUNCTIONAL

## 2023-10-24 DEVICE — IMPLANTABLE DEVICE: Type: IMPLANTABLE DEVICE | Site: LEFT, RIGHT | Status: FUNCTIONAL

## 2023-10-24 DEVICE — KWIRE DBL TROCAR .035X4IN: Type: IMPLANTABLE DEVICE | Site: LEFT, RIGHT | Status: FUNCTIONAL

## 2023-10-24 RX ORDER — HYDROMORPHONE HYDROCHLORIDE 2 MG/ML
0.5 INJECTION INTRAMUSCULAR; INTRAVENOUS; SUBCUTANEOUS
Refills: 0 | Status: DISCONTINUED | OUTPATIENT
Start: 2023-10-24 | End: 2023-10-24

## 2023-10-24 RX ORDER — ONDANSETRON 8 MG/1
4 TABLET, FILM COATED ORAL ONCE
Refills: 0 | Status: DISCONTINUED | OUTPATIENT
Start: 2023-10-24 | End: 2023-11-07

## 2023-10-24 RX ORDER — OXYCODONE HYDROCHLORIDE 5 MG/1
5 TABLET ORAL ONCE
Refills: 0 | Status: DISCONTINUED | OUTPATIENT
Start: 2023-10-24 | End: 2023-10-24

## 2023-10-24 NOTE — ASU DISCHARGE PLAN (ADULT/PEDIATRIC) - NS MD DC FALL RISK RISK
For information on Fall & Injury Prevention, visit: [email or web address] OR  [email or web address] OR  https://www.cdc.gov/steadi/patient.html

## 2023-10-24 NOTE — ASU DISCHARGE PLAN (ADULT/PEDIATRIC) - FOLLOW UP APPOINTMENTS
726 May also call Recovery Room (PACU) 24/7 @ [phone]/[facility]

## 2023-10-24 NOTE — ASU PATIENT PROFILE, ADULT - FALL HARM RISK - UNIVERSAL INTERVENTIONS
Bed in lowest position, wheels locked, appropriate side rails in place/Call bell, personal items and telephone in reach/Instruct patient to call for assistance before getting out of bed or chair/Non-slip footwear when patient is out of bed/[place] to call system/Physically safe environment - no spills, clutter or unnecessary equipment/Purposeful Proactive Rounding/Room/bathroom lighting operational, light cord in reach

## 2023-10-24 NOTE — ASU DISCHARGE PLAN (ADULT/PEDIATRIC) - ASU DC SPECIAL INSTRUCTIONSFT
Podiatry Discharge Instructions:  Follow up: Please follow up with Dr. Casas within 1 week of discharge from the hospital, please call [phone] for appointment and discuss that you recently were seen in the hospital.  Wound Care: Please leave your dressing clean dry intact until your follow up appointment .  Weight bearing: Please weight bear as tolerated in a surgical shoe.

## 2023-10-24 NOTE — ASU PATIENT PROFILE, ADULT - PATIENT’S MOTHER’S MAIDEN FIRST NAME (INFO USED BY THE IMMUNIZATION REGISTRY):
[facility] )    Acquired hypothyroidism    Acute on chronic anemia    Anemia due to stage 3a chronic kidney disease ([facility] )    Stage 3a chronic kidney disease ([facility] )    CREST syndrome (HCC)    Paroxysmal atrial fibrillation ([facility] )    VA TOTAL KNEE ARTHROPLASTY Right 11/16/Neil Gonzales MD    Provider located at [address] [place] [id number]      Recent Visits  Date Type Provider Dept   07/11/22 Telemedicine Neil Gonzales MD [facility]   [employer]  Pt is aware that [employer] declines

## 2023-10-24 NOTE — ASU DISCHARGE PLAN (ADULT/PEDIATRIC) - CARE PROVIDER_API CALL
Samy Casas  Podiatric Medicine and Surgery  [address], [address]  [place] [address]  Phone: [phone]  Fax: [phone]  Follow Up Time: 1 week

## 2023-10-24 NOTE — ASU DISCHARGE PLAN (ADULT/PEDIATRIC) - PROCEDURE
s/p right foot 2nd digit fusison, hallux exostectomy s/p right foot 2nd digit fusison, hallux exostectomy, s/p left 4th biopsy s/p right foot 2nd digit fusion, hallux exostectomy

## 2023-10-27 ENCOUNTER — NON-APPOINTMENT (OUTPATIENT)
Age: 53
End: 2023-10-27

## 2023-10-27 ENCOUNTER — APPOINTMENT (OUTPATIENT)
Dept: CARDIOLOGY | Facility: CLINIC | Age: 53
End: 2023-10-27
Payer: MEDICAID

## 2023-10-27 PROCEDURE — 99214 OFFICE O/P EST MOD 30 MIN: CPT | Mod: 95

## 2023-10-27 RX ORDER — SEMAGLUTIDE 2.68 MG/ML
8 INJECTION, SOLUTION SUBCUTANEOUS
Qty: 3 | Refills: 3 | Status: DISCONTINUED | COMMUNITY
Start: 2022-09-07 | End: 2023-10-27

## 2023-10-30 ENCOUNTER — RX RENEWAL (OUTPATIENT)
Age: 53
End: 2023-10-30

## 2023-10-30 VITALS — WEIGHT: 238 LBS | BODY MASS INDEX: 29.59 KG/M2 | HEIGHT: 75 IN

## 2023-10-30 RX ORDER — DICLOFENAC SODIUM 75 MG/1
75 TABLET, DELAYED RELEASE ORAL
Qty: 60 | Refills: 0 | Status: ACTIVE | COMMUNITY
Start: 2023-07-24 | End: 1900-01-01

## 2023-10-30 RX ORDER — TURMERIC/TURMERIC EXT/PEPR EXT 900-100 MG
CAPSULE ORAL
Refills: 0 | Status: ACTIVE | COMMUNITY

## 2023-10-30 RX ORDER — CHOLECALCIFEROL (VITAMIN D3) 50 MCG
50 MCG TABLET ORAL
Refills: 0 | Status: ACTIVE | COMMUNITY

## 2023-10-30 RX ORDER — TRYPTOPHAN 500 MG
TABLET ORAL
Refills: 0 | Status: ACTIVE | COMMUNITY

## 2023-10-30 RX ORDER — AMINO ACIDS
CAPSULE ORAL
Refills: 0 | Status: ACTIVE | COMMUNITY

## 2023-10-31 ENCOUNTER — APPOINTMENT (OUTPATIENT)
Dept: UROLOGY | Facility: CLINIC | Age: 53
End: 2023-10-31
Payer: MEDICAID

## 2023-10-31 DIAGNOSIS — Z80.7 FAMILY HISTORY OF OTHER MALIGNANT NEOPLASMS OF LYMPHOID, HEMATOPOIETIC AND RELATED TISSUES: ICD-10-CM

## 2023-10-31 DIAGNOSIS — Z83.3 FAMILY HISTORY OF DIABETES MELLITUS: ICD-10-CM

## 2023-10-31 DIAGNOSIS — Z87.39 PERSONAL HISTORY OF OTHER DISEASES OF THE MUSCULOSKELETAL SYSTEM AND CONNECTIVE TISSUE: ICD-10-CM

## 2023-10-31 DIAGNOSIS — Z84.2 FAMILY HISTORY OF OTHER DISEASES OF THE GENITOURINARY SYSTEM: ICD-10-CM

## 2023-10-31 DIAGNOSIS — N52.01 ERECTILE DYSFUNCTION DUE TO ARTERIAL INSUFFICIENCY: ICD-10-CM

## 2023-10-31 PROCEDURE — 99204 OFFICE O/P NEW MOD 45 MIN: CPT | Mod: 95

## 2023-10-31 PROCEDURE — 99214 OFFICE O/P EST MOD 30 MIN: CPT | Mod: 95

## 2023-11-03 LAB
SURGICAL PATHOLOGY STUDY: SIGNIFICANT CHANGE UP
SURGICAL PATHOLOGY STUDY: SIGNIFICANT CHANGE UP

## 2023-11-07 ENCOUNTER — NON-APPOINTMENT (OUTPATIENT)
Age: 53
End: 2023-11-07

## 2023-11-10 ENCOUNTER — APPOINTMENT (OUTPATIENT)
Dept: INFECTIOUS DISEASE | Facility: CLINIC | Age: 53
End: 2023-11-10

## 2023-11-20 RX ORDER — TADALAFIL 2.5 MG/1
2.5 TABLET, FILM COATED ORAL
Qty: 3 | Refills: 3 | Status: ACTIVE | COMMUNITY
Start: 2023-11-20 | End: 1900-01-01

## 2023-11-20 RX ORDER — TADALAFIL 5 MG/1
5 TABLET ORAL
Qty: 90 | Refills: 3 | Status: DISCONTINUED | COMMUNITY
Start: 2023-10-31 | End: 2023-11-20

## 2023-11-29 ENCOUNTER — APPOINTMENT (OUTPATIENT)
Dept: CARDIOLOGY | Facility: CLINIC | Age: 53
End: 2023-11-29
Payer: MEDICAID

## 2023-11-29 PROCEDURE — 99214 OFFICE O/P EST MOD 30 MIN: CPT | Mod: 95

## 2023-11-30 ENCOUNTER — APPOINTMENT (OUTPATIENT)
Dept: PSYCHIATRY | Facility: CLINIC | Age: 53
End: 2023-11-30
Payer: MEDICAID

## 2023-11-30 PROCEDURE — 90791 PSYCH DIAGNOSTIC EVALUATION: CPT | Mod: GT

## 2023-12-02 ENCOUNTER — OUTPATIENT (OUTPATIENT)
Dept: OUTPATIENT SERVICES | Facility: HOSPITAL | Age: 53
LOS: 1 days | End: 2023-12-02
Payer: MEDICAID

## 2023-12-02 ENCOUNTER — APPOINTMENT (OUTPATIENT)
Dept: MRI IMAGING | Facility: IMAGING CENTER | Age: 53
End: 2023-12-02
Payer: MEDICAID

## 2023-12-02 ENCOUNTER — RESULT REVIEW (OUTPATIENT)
Age: 53
End: 2023-12-02

## 2023-12-02 DIAGNOSIS — N52.01 ERECTILE DYSFUNCTION DUE TO ARTERIAL INSUFFICIENCY: ICD-10-CM

## 2023-12-02 DIAGNOSIS — M20.41 OTHER HAMMER TOE(S) (ACQUIRED), RIGHT FOOT: Chronic | ICD-10-CM

## 2023-12-02 DIAGNOSIS — G47.33 OBSTRUCTIVE SLEEP APNEA (ADULT) (PEDIATRIC): Chronic | ICD-10-CM

## 2023-12-02 DIAGNOSIS — Z98.890 OTHER SPECIFIED POSTPROCEDURAL STATES: Chronic | ICD-10-CM

## 2023-12-02 PROCEDURE — 72197 MRI PELVIS W/O & W/DYE: CPT

## 2023-12-02 PROCEDURE — A9585: CPT

## 2023-12-02 PROCEDURE — 72197 MRI PELVIS W/O & W/DYE: CPT | Mod: 26

## 2023-12-02 PROCEDURE — 76498 UNLISTED MR PROCEDURE: CPT

## 2023-12-02 PROCEDURE — 76498P: CUSTOM | Mod: 26

## 2023-12-04 ENCOUNTER — RX RENEWAL (OUTPATIENT)
Age: 53
End: 2023-12-04

## 2023-12-04 DIAGNOSIS — I10 ESSENTIAL (PRIMARY) HYPERTENSION: ICD-10-CM

## 2023-12-04 RX ORDER — LISINOPRIL AND HYDROCHLOROTHIAZIDE TABLETS 20; 12.5 MG/1; MG/1
20-12.5 TABLET ORAL
Qty: 30 | Refills: 4 | Status: ACTIVE | COMMUNITY
Start: 2023-12-04 | End: 1900-01-01

## 2023-12-07 ENCOUNTER — APPOINTMENT (OUTPATIENT)
Dept: PSYCHIATRY | Facility: CLINIC | Age: 53
End: 2023-12-07
Payer: MEDICAID

## 2023-12-07 PROCEDURE — 90837 PSYTX W PT 60 MINUTES: CPT | Mod: GT

## 2023-12-11 ENCOUNTER — APPOINTMENT (OUTPATIENT)
Dept: PSYCHIATRY | Facility: CLINIC | Age: 53
End: 2023-12-11
Payer: MEDICAID

## 2023-12-11 ENCOUNTER — APPOINTMENT (OUTPATIENT)
Dept: UROLOGY | Facility: CLINIC | Age: 53
End: 2023-12-11
Payer: MEDICAID

## 2023-12-11 PROCEDURE — 90834 PSYTX W PT 45 MINUTES: CPT | Mod: GT

## 2023-12-11 PROCEDURE — 99213 OFFICE O/P EST LOW 20 MIN: CPT | Mod: 95

## 2023-12-11 RX ORDER — DIAZEPAM 5 MG/1
5 TABLET ORAL
Qty: 1 | Refills: 0 | Status: ACTIVE | COMMUNITY
Start: 2023-12-11 | End: 1900-01-01

## 2023-12-12 ENCOUNTER — OUTPATIENT (OUTPATIENT)
Dept: OUTPATIENT SERVICES | Facility: HOSPITAL | Age: 53
LOS: 1 days | End: 2023-12-12
Payer: MEDICAID

## 2023-12-12 DIAGNOSIS — G47.33 OBSTRUCTIVE SLEEP APNEA (ADULT) (PEDIATRIC): Chronic | ICD-10-CM

## 2023-12-12 DIAGNOSIS — M20.41 OTHER HAMMER TOE(S) (ACQUIRED), RIGHT FOOT: Chronic | ICD-10-CM

## 2023-12-12 DIAGNOSIS — R97.20 ELEVATED PROSTATE SPECIFIC ANTIGEN [PSA]: ICD-10-CM

## 2023-12-12 DIAGNOSIS — Z98.890 OTHER SPECIFIED POSTPROCEDURAL STATES: Chronic | ICD-10-CM

## 2023-12-12 PROCEDURE — C8001: CPT

## 2023-12-14 ENCOUNTER — RX RENEWAL (OUTPATIENT)
Age: 53
End: 2023-12-14

## 2023-12-15 ENCOUNTER — APPOINTMENT (OUTPATIENT)
Dept: UROLOGY | Facility: CLINIC | Age: 53
End: 2023-12-15
Payer: MEDICAID

## 2023-12-15 ENCOUNTER — OUTPATIENT (OUTPATIENT)
Dept: OUTPATIENT SERVICES | Facility: HOSPITAL | Age: 53
LOS: 1 days | End: 2023-12-15
Payer: MEDICAID

## 2023-12-15 VITALS
HEART RATE: 75 BPM | DIASTOLIC BLOOD PRESSURE: 81 MMHG | OXYGEN SATURATION: 99 % | SYSTOLIC BLOOD PRESSURE: 150 MMHG | RESPIRATION RATE: 18 BRPM

## 2023-12-15 VITALS — SYSTOLIC BLOOD PRESSURE: 124 MMHG | DIASTOLIC BLOOD PRESSURE: 77 MMHG

## 2023-12-15 DIAGNOSIS — Z98.890 OTHER SPECIFIED POSTPROCEDURAL STATES: Chronic | ICD-10-CM

## 2023-12-15 DIAGNOSIS — G47.33 OBSTRUCTIVE SLEEP APNEA (ADULT) (PEDIATRIC): Chronic | ICD-10-CM

## 2023-12-15 DIAGNOSIS — M20.41 OTHER HAMMER TOE(S) (ACQUIRED), RIGHT FOOT: Chronic | ICD-10-CM

## 2023-12-15 DIAGNOSIS — R97.20 ELEVATED PROSTATE, SPECIFIC ANTIGEN [PSA]: ICD-10-CM

## 2023-12-15 DIAGNOSIS — R35.0 FREQUENCY OF MICTURITION: ICD-10-CM

## 2023-12-15 PROCEDURE — 55700: CPT

## 2023-12-15 PROCEDURE — 76942 ECHO GUIDE FOR BIOPSY: CPT | Mod: 59

## 2023-12-15 PROCEDURE — 55700: CPT | Mod: 22

## 2023-12-15 PROCEDURE — 76377 3D RENDER W/INTRP POSTPROCES: CPT | Mod: 26

## 2023-12-15 PROCEDURE — 76942 ECHO GUIDE FOR BIOPSY: CPT | Mod: 26,59

## 2023-12-18 ENCOUNTER — APPOINTMENT (OUTPATIENT)
Dept: INFECTIOUS DISEASE | Facility: CLINIC | Age: 53
End: 2023-12-18
Payer: MEDICAID

## 2023-12-18 ENCOUNTER — APPOINTMENT (OUTPATIENT)
Dept: OPHTHALMOLOGY | Facility: CLINIC | Age: 53
End: 2023-12-18
Payer: MEDICAID

## 2023-12-18 ENCOUNTER — NON-APPOINTMENT (OUTPATIENT)
Age: 53
End: 2023-12-18

## 2023-12-18 VITALS
WEIGHT: 243 LBS | HEART RATE: 94 BPM | SYSTOLIC BLOOD PRESSURE: 125 MMHG | BODY MASS INDEX: 30.21 KG/M2 | TEMPERATURE: 98.3 F | OXYGEN SATURATION: 97 % | HEIGHT: 75 IN | DIASTOLIC BLOOD PRESSURE: 78 MMHG

## 2023-12-18 PROCEDURE — 99214 OFFICE O/P EST MOD 30 MIN: CPT | Mod: 25

## 2023-12-18 PROCEDURE — G0008: CPT

## 2023-12-18 PROCEDURE — 90686 IIV4 VACC NO PRSV 0.5 ML IM: CPT

## 2023-12-18 PROCEDURE — 92014 COMPRE OPH EXAM EST PT 1/>: CPT

## 2023-12-18 NOTE — ASSESSMENT
[FreeTextEntry1] : plan 12/18/23: 1) continue Mari and all meds 2) labs today see in 6 months 3) flu vaccine today

## 2023-12-18 NOTE — HISTORY OF PRESENT ILLNESS
[FreeTextEntry1] : 12/18/23----VENU SANDOVAL is a 53 year old male being seen for a follow-up visit. Here for 6 month visit.  Diagnosed with AIDS in 2008. Now Cd4 over 500. Undetectable VL. Has PCP Dr. Urbano Jacobs. Followed by Dr. 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 He is doing well. recently went to dental 2020 and ophthalmology. went to ENT and pulmonology and [facility] for lipids and HTN. updated meds list 5/7/2021---Doing well on Mari, Amlodipine 5mg Pt also had recent stress test. Pt to continue the Lisinopril-Hydrochlorothiazide 20-12.5 MG Oral Tablet and Vit D3 as well. rosuvastatin 10 mg from Dr. Jordy Peraza and famotidine 40mg daily . No family history of Colon Ca. Seen by Dr. Craig and doing well. Had the colonoscopy, all well, has follow up. had Pfizer covid vaccine. Pt states familial history of Multiple myeloma. Sexual History: The patient is sexually active.  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)  Hyperlipidemia, mild (272.4) (E78.5)  Hypertension (401.9) (I10)  Insomnia (780.52) (G47.00)  Intermittent memory loss (780.93) (R41.3)  Low testosterone (790.99) (R79.89)  Need for Menactra vaccination (V03.89) (Z23)  Neuropathy involving both lower extremities (356.9) (G57.93)  CLARI (obstructive sleep apnea) (327.23) (G47.33)  Osteoarthritis of both hips (715.95) (M16.0)  Preop testing (V72.84) (Z01.818)  Preop testing (V72.84) (Z01.818)  Pre-procedural laboratory examination (V72.63) (Z01.812)  Prostate cancer screening (V76.44) (Z12.5)  Rash (782.1) (R21)  Scoliosis (737.30) (M41.9)  Shortness of breath on exertion (786.05) (R06.02)  Sickle cell trait (282.5) (D57.3)  Skin rash (782.1) (R21)  Sleep apnea (780.57) (G47.30)  Vitamin D deficiency (268.9) (E55.9)    Past Medical History  History of blood transfusion (V15.89) (Z92.89)  History of candidiasis of mouth (V12.09) (Z86.19)  History of complete eye exam (V15.89) (Z92.89)  History of dental examination (V15.89) (Z92.89)  History of gonorrhea (V12.09) (Z86.19)  History of hemorrhoids (V13.89) (Z87.19)  History of pneumocystis pneumonia (V12.61) (Z87.01)  History of Hypophosphatemia (275.3) (E83.39)  History of Perirectal ulcer (569.41) (K62.6)  History of Walking pneumonia (486) (J18.9)    Current Meds  amLODIPine Besylate 5 MG Oral Tablet; TAKE ONE TABLET BY MOUTH DAILY  Azelastine HCl - 0.1 % Nasal Solution; INHALE 2 SQUIRT Twice daily  Diclofenac Sodium 75 MG Oral Tablet Delayed Release; Take 1 tablet twice daily  Famotidine 40 MG Oral Tablet; TAKE 1 TABLET AT BEDTIME  Genvoya [phone] MG Oral Tablet; TAKE 1 TABLET BY MOUTH DAILY  Lisinopril-hydroCHLOROthiazide 20-12.5 MG Oral Tablet; TAKE ONE TABLET BY MOUTH  DAILY  Rosuvastatin Calcium 10 MG Oral Tablet; TAKE 1 TABLET DAILY  Vitamin D (Ergocalciferol) 1.25 MG (77828 UT) Oral Capsule; TAKE 1 CAPSULE BY  MOUTH EVERY 2 WEEKS    Allergies  No Known Drug Allergies  Animal dander - Cats  Mold

## 2023-12-19 ENCOUNTER — APPOINTMENT (OUTPATIENT)
Dept: SURGERY | Facility: CLINIC | Age: 53
End: 2023-12-19
Payer: MEDICAID

## 2023-12-19 ENCOUNTER — NON-APPOINTMENT (OUTPATIENT)
Age: 53
End: 2023-12-19

## 2023-12-19 VITALS
RESPIRATION RATE: 17 BRPM | DIASTOLIC BLOOD PRESSURE: 73 MMHG | HEART RATE: 84 BPM | SYSTOLIC BLOOD PRESSURE: 121 MMHG | OXYGEN SATURATION: 98 % | TEMPERATURE: 97.2 F

## 2023-12-19 DIAGNOSIS — K62.9 DISEASE OF ANUS AND RECTUM, UNSPECIFIED: ICD-10-CM

## 2023-12-19 PROBLEM — R97.20 ELEVATED PSA: Status: ACTIVE | Noted: 2023-10-22

## 2023-12-19 LAB
25(OH)D3 SERPL-MCNC: 49.2 NG/ML
ALBUMIN SERPL ELPH-MCNC: 4.9 G/DL
ALP BLD-CCNC: 65 U/L
ALT SERPL-CCNC: 35 U/L
ANION GAP SERPL CALC-SCNC: 14 MMOL/L
APPEARANCE: CLEAR
AST SERPL-CCNC: 29 U/L
BACTERIA: NEGATIVE /HPF
BILIRUB SERPL-MCNC: 0.5 MG/DL
BILIRUBIN URINE: NEGATIVE
BLOOD URINE: ABNORMAL
BUN SERPL-MCNC: 10 MG/DL
C TRACH RRNA SPEC QL NAA+PROBE: NOT DETECTED
CALCIUM SERPL-MCNC: 10.5 MG/DL
CAST: 0 /LPF
CD3 CELLS # BLD: 2022 CELLS/UL
CD3 CELLS NFR BLD: 70 %
CD3+CD4+ CELLS # BLD: 757 CELLS/UL
CD3+CD4+ CELLS NFR BLD: 26 %
CD3+CD4+ CELLS/CD3+CD8+ CLL SPEC: 0.64 RATIO
CD3+CD8+ CELLS # SPEC: 1190 CELLS/UL
CD3+CD8+ CELLS NFR BLD: 41 %
CHLORIDE SERPL-SCNC: 102 MMOL/L
CHOLEST SERPL-MCNC: 191 MG/DL
CO2 SERPL-SCNC: 26 MMOL/L
COLOR: YELLOW
CREAT SERPL-MCNC: 1.46 MG/DL
EGFR: 57 ML/MIN/1.73M2
EPITHELIAL CELLS: 1 /HPF
ESTIMATED AVERAGE GLUCOSE: 108 MG/DL
GLUCOSE QUALITATIVE U: NEGATIVE MG/DL
GLUCOSE SERPL-MCNC: 90 MG/DL
HBA1C MFR BLD HPLC: 5.4 %
HCT VFR BLD CALC: 39 %
HDLC SERPL-MCNC: 46 MG/DL
HGB BLD-MCNC: 13.2 G/DL
HIV1 RNA # SERPL NAA+PROBE: NORMAL
HIV1 RNA # SERPL NAA+PROBE: NORMAL COPIES/ML
KETONES URINE: NEGATIVE MG/DL
LDLC SERPL CALC-MCNC: 97 MG/DL
LEUKOCYTE ESTERASE URINE: NEGATIVE
MCHC RBC-ENTMCNC: 29.7 PG
MCHC RBC-ENTMCNC: 33.8 GM/DL
MCV RBC AUTO: 87.6 FL
MICROSCOPIC-UA: NORMAL
N GONORRHOEA RRNA SPEC QL NAA+PROBE: NOT DETECTED
NITRITE URINE: NEGATIVE
NONHDLC SERPL-MCNC: 144 MG/DL
PH URINE: 6
PLATELET # BLD AUTO: 263 K/UL
POTASSIUM SERPL-SCNC: 3.7 MMOL/L
PROT SERPL-MCNC: 7.9 G/DL
PROTEIN URINE: NEGATIVE MG/DL
PSA SERPL-MCNC: 8.86 NG/ML
RBC # BLD: 4.45 M/UL
RBC # FLD: 12.9 %
RED BLOOD CELLS URINE: 1 /HPF
REVIEW: NORMAL
SODIUM SERPL-SCNC: 142 MMOL/L
SOURCE AMPLIFICATION: NORMAL
SPECIFIC GRAVITY URINE: 1.01
T PALLIDUM AB SER QL IA: NEGATIVE
TRIGL SERPL-MCNC: 278 MG/DL
TSH SERPL-ACNC: 1.25 UIU/ML
UROBILINOGEN URINE: 0.2 MG/DL
VIRAL LOAD INTERP: NORMAL
VIRAL LOAD LOG: NORMAL LG COP/ML
WBC # FLD AUTO: 5.26 K/UL
WHITE BLOOD CELLS URINE: 1 /HPF

## 2023-12-19 PROCEDURE — 46600 DIAGNOSTIC ANOSCOPY SPX: CPT

## 2023-12-19 NOTE — HISTORY OF PRESENT ILLNESS
[FreeTextEntry1] : Pravin is a 52 y/o male here for a follow up visit.   Has AIDS  s/p Excision of perianal mass x 2 on 12/21/22 for perianal squamous cell carcinoma in situ. Pathology: Perianal mass left, biopsy: Squamous cell carcinoma in situ. Perianal mass anterior midline, Biopsy: Condyloma acuminate.  CT Colonography 01/17/22 - Colon is visualized in its entirety from the rectum to cecum. Mild colonic diverticulosis. Ileocecal valve is visualized. Appendix is normal. No colonic polyp or mass is identified. No colonic polyp or mass.  Colonoscopy from 7/16/21 demonstrates - Redundant colon and significant looping. Unable to advance scope past the ascending colon despite multiple attempts with manual pressure, changing patient to supine position and straightening / shortening the scope. No polyps identified in the entire examined portion of the colon. No specimens collected.  Last seen on 6/6/23 - In summary the patient has history of squamous cell carcinoma in situ excised 6 months ago. He is finishing his course of Aldara. For the past several days he has had perianal pain and discharge. Examination reveals multiple tender perianal ulcers. I suspect this is viral/infectious in etiology and referred him to infectious disease for further evaluation. His incisions have healed. I should see him again in the office in 3 months for surveillance exam. I also told him to discontinue Aldara in case this is a reaction to topical treatment.  Today pt reports no pain. Daily BMs, formed, denies pain, no bleeding, no episodes of incontinence, and denies feeling swollen or prolapsed tissue. Stopped taking the aldara.  Not taking any anticoagulants.

## 2023-12-19 NOTE — PHYSICAL EXAM
[Abdomen Tenderness] : ~T No ~M abdominal tenderness [Wheezing] : no wheezing was heard [No Rash or Lesion] : No rash or lesion [Alert] : alert [Calm] : calm [de-identified] : Perianal inspection reveals a healed scar in the left posterior position.  There is no evidence of gross residual neoplasm.  His perianal lesions have completely resolved.  Perianal skin otherwise appears normal.  There is no fissure fistula or abscess.  Digital rectal examination and anoscopy are also both normal. [de-identified] : nad [de-identified] : nl

## 2023-12-20 DIAGNOSIS — R97.20 ELEVATED PROSTATE SPECIFIC ANTIGEN [PSA]: ICD-10-CM

## 2023-12-21 ENCOUNTER — APPOINTMENT (OUTPATIENT)
Dept: PSYCHIATRY | Facility: CLINIC | Age: 53
End: 2023-12-21
Payer: MEDICAID

## 2023-12-21 PROCEDURE — 90837 PSYTX W PT 60 MINUTES: CPT | Mod: 95

## 2023-12-22 NOTE — REASON FOR VISIT
[Patient preference] : as per patient preference [Continuity of care] : to ensure continuity of care [Continuing, patient not seen in-person within last 12 months (provide details below)] : Telehealth services are continuing, patient not seen in-person within last 12 months.  [Telehealth (audio & video) - Individual/Group] : This visit was provided via telehealth using real-time 2-way audio visual technology. [Other Location: e.g. Home (Enter Location, City,State)___] : The patient, [unfilled], was located at [unfilled] at the time of the visit. [FreeTextEntry4] : 5:00PM [FreeTextEntry5] : 5:53PM [Patient] : Patient [FreeTextEntry1] : anxiety

## 2023-12-22 NOTE — PLAN
[FreeTextEntry2] : Pt seeks to decrease anxiety symptoms.  [Cognitive and/or Behavior Therapy] : Cognitive and/or Behavior Therapy  [Psychoeducation] : Psychoeducation  [de-identified] : Psychologist met with Pt using teletherapy platform. Pt was at their home while psychologist was working remotely via [facility]. Pt continued to provide consent for telehealth services.   Affect was observed to be full. Mood was reported as unchanged. Pt was responsive, receptive, and engaged throughout session. Evidence of risk was neither observed nor reported.   Goals of the session were to provide psychoeducation regarding anxiety and emotions and discuss treatment goals. Pt will review goals for hw. [Return in ____ week(s)] : Return in [unfilled] week(s) [FreeTextEntry1] : Psychoeducation about Diagnosis & Treatment Model  1. Pt will learn about their symptoms and diagnosis and be able to explain it back to psychologist.  2. Pt will learn about the CBT model and be able to accurately categorize their symptoms and diagnosis in terms of cognition, emotional responses, behaviors, and physiological arousal.   Cognitive Therapy Interventions   1. Pt can benefit from learning and practicing cognitive restructuring, including learning to categorize automatic thoughts/cognitive distortions, judgments, biases, and attributions   2. Pt can benefit from using cognitive reframing to challenge cognitive distortions to increase flexibility in thinking   3. Pt can benefit from using self-coping statements to encourage themselves and provide validation   4. Cognitive restructuring will be learned and practiced within session, and assigned for homework between sessions. This will entail interventions such as mood monitoring and thought record forms, to challenge cognitive distortions in real-time.     Relaxation Techniques  1. Pt can benefit from learning and practicing Relaxation Techniques to reduce physiological arousal, including flight/flight/freeze responses, manifested by muscle tightness or tension; emotions including anxiety and panic, sadness and depression, and anger.  2. Relaxation techniques can include any or all of the following: Diaphragmatic Breathing (DB), Progressive Muscle Relaxation (PMR), Imaginal Relaxation (IMR), or mindfulness.  3. Relaxation techniques will be learned and practiced within sessions, and assigned for homework between sessions. This will entail interventions such using monitoring forms to track their stress and other symptoms, and assess the efficacy of their implementation of relaxation techniques.   Exposure Therapy Interventions   1. Pt can benefit from learning/continuing exposure therapy interventions, involving confronting situational, emotional, physiological, and cognitive avoided situations.   2. Pt can benefit from reducing and eliminating safety behaviors/objects, such as maladaptive response/ritualization prevention, removing "just in case" items that maintain anxiety, panic, obsessive, and post-traumatic symptoms.   3. Exposure therapy involves any or all of these modalities: in vivo, imaginal, interoceptive, Virtual Reality, written/narrative, and analog in vivo exposure   4. Exposures will be conducted within psychotherapy sessions and assigned for homework between sessions

## 2023-12-22 NOTE — END OF VISIT
[Duration of Psychotherapy Visit (minutes spent in synchronous communication): ____] : Duration of Psychotherapy Visit (minutes spent in synchronous communication): [unfilled] [Individual Psychotherapy for 53+ minutes] : Individual Psychotherapy for 53+ minutes  [Teletherapy Service Provided] : The services provided in this session were delivered via tele-therapy [FreeTextEntry3] : Work [FreeTextEntry5] : Remote office via [facility]

## 2023-12-26 PROBLEM — G47.33 OSA (OBSTRUCTIVE SLEEP APNEA): Status: ACTIVE | Noted: 2020-10-12

## 2023-12-27 ENCOUNTER — APPOINTMENT (OUTPATIENT)
Dept: CARDIOLOGY | Facility: CLINIC | Age: 53
End: 2023-12-27
Payer: MEDICAID

## 2023-12-27 ENCOUNTER — APPOINTMENT (OUTPATIENT)
Dept: PSYCHIATRY | Facility: CLINIC | Age: 53
End: 2023-12-27
Payer: MEDICAID

## 2023-12-27 ENCOUNTER — NON-APPOINTMENT (OUTPATIENT)
Age: 53
End: 2023-12-27

## 2023-12-27 VITALS
RESPIRATION RATE: 18 BRPM | OXYGEN SATURATION: 98 % | HEART RATE: 90 BPM | BODY MASS INDEX: 30.46 KG/M2 | SYSTOLIC BLOOD PRESSURE: 124 MMHG | HEIGHT: 75 IN | WEIGHT: 245 LBS | DIASTOLIC BLOOD PRESSURE: 79 MMHG

## 2023-12-27 DIAGNOSIS — G47.33 OBSTRUCTIVE SLEEP APNEA (ADULT) (PEDIATRIC): ICD-10-CM

## 2023-12-27 PROCEDURE — 90837 PSYTX W PT 60 MINUTES: CPT | Mod: 95

## 2023-12-27 PROCEDURE — 99215 OFFICE O/P EST HI 40 MIN: CPT | Mod: 25

## 2023-12-27 PROCEDURE — 93000 ELECTROCARDIOGRAM COMPLETE: CPT

## 2023-12-27 RX ORDER — DAPAGLIFLOZIN 5 MG/1
5 TABLET, FILM COATED ORAL
Qty: 90 | Refills: 1 | Status: ACTIVE | COMMUNITY
Start: 2023-12-27 | End: 1900-01-01

## 2023-12-27 NOTE — REASON FOR VISIT
[Patient preference] : as per patient preference [Continuity of care] : to ensure continuity of care [Continuing, patient not seen in-person within last 12 months (provide details below)] : Telehealth services are continuing, patient not seen in-person within last 12 months.  [Telehealth (audio & video) - Individual/Group] : This visit was provided via telehealth using real-time 2-way audio visual technology. [Other Location: e.g. Home (Enter Location, City,State)___] : The provider was located at [unfilled]. [Home] : The patient, [unfilled], was located at home, [unfilled], at the time of the visit. [FreeTextEntry4] : 10:00AM [FreeTextEntry5] : 10:53AM [Patient] : Patient [FreeTextEntry1] : anxiety

## 2023-12-27 NOTE — PLAN
[FreeTextEntry2] : Pt seeks to decrease anxiety symptoms.  [Cognitive and/or Behavior Therapy] : Cognitive and/or Behavior Therapy  [Psychoeducation] : Psychoeducation  [de-identified] : Psychologist met with Pt using teletherapy platform. Pt was at their home while psychologist was working remotely via [facility]. Pt continued to provide consent for telehealth services.   Affect was observed to be full. Mood was reported as unchanged. Pt was responsive, receptive, and engaged throughout session. Evidence of risk was neither observed nor reported.   Session involved review of goals, psychoeducation about worry (via worry flow chart) and mindfulness practices (e.g., describing; attention). Emotions Pt monitored were also outlined, as well as thoughts Pt utilized to respond to antecedents to worry. Pt will continue practicing responses to anxiety/worry for hw.  [Return in ____ week(s)] : Return in [unfilled] week(s) [FreeTextEntry1] : Psychoeducation about Diagnosis & Treatment Model  1. Pt will learn about their symptoms and diagnosis and be able to explain it back to psychologist.  2. Pt will learn about the CBT model and be able to accurately categorize their symptoms and diagnosis in terms of cognition, emotional responses, behaviors, and physiological arousal.   Cognitive Therapy Interventions   1. Pt can benefit from learning and practicing cognitive restructuring, including learning to categorize automatic thoughts/cognitive distortions, judgments, biases, and attributions   2. Pt can benefit from using cognitive reframing to challenge cognitive distortions to increase flexibility in thinking   3. Pt can benefit from using self-coping statements to encourage themselves and provide validation   4. Cognitive restructuring will be learned and practiced within session, and assigned for homework between sessions. This will entail interventions such as mood monitoring and thought record forms, to challenge cognitive distortions in real-time.     Relaxation Techniques  1. Pt can benefit from learning and practicing Relaxation Techniques to reduce physiological arousal, including flight/flight/freeze responses, manifested by muscle tightness or tension; emotions including anxiety and panic, sadness and depression, and anger.  2. Relaxation techniques can include any or all of the following: Diaphragmatic Breathing (DB), Progressive Muscle Relaxation (PMR), Imaginal Relaxation (IMR), or mindfulness.  3. Relaxation techniques will be learned and practiced within sessions, and assigned for homework between sessions. This will entail interventions such using monitoring forms to track their stress and other symptoms, and assess the efficacy of their implementation of relaxation techniques.   Exposure Therapy Interventions   1. Pt can benefit from learning/continuing exposure therapy interventions, involving confronting situational, emotional, physiological, and cognitive avoided situations.   2. Pt can benefit from reducing and eliminating safety behaviors/objects, such as maladaptive response/ritualization prevention, removing "just in case" items that maintain anxiety, panic, obsessive, and post-traumatic symptoms.   3. Exposure therapy involves any or all of these modalities: in vivo, imaginal, interoceptive, Virtual Reality, written/narrative, and analog in vivo exposure   4. Exposures will be conducted within psychotherapy sessions and assigned for homework between sessions

## 2023-12-27 NOTE — END OF VISIT
[Duration of Psychotherapy Visit (minutes spent in synchronous communication): ____] : Duration of Psychotherapy Visit (minutes spent in synchronous communication): [unfilled] [Individual Psychotherapy for 53+ minutes] : Individual Psychotherapy for 53+ minutes  [Teletherapy Service Provided] : The services provided in this session were delivered via tele-therapy [FreeTextEntry3] : Home [FreeTextEntry5] : Remote office via [facility]

## 2024-01-03 ENCOUNTER — APPOINTMENT (OUTPATIENT)
Dept: UROLOGY | Facility: CLINIC | Age: 54
End: 2024-01-03

## 2024-01-04 ENCOUNTER — RX RENEWAL (OUTPATIENT)
Age: 54
End: 2024-01-04

## 2024-01-04 ENCOUNTER — APPOINTMENT (OUTPATIENT)
Dept: PSYCHIATRY | Facility: CLINIC | Age: 54
End: 2024-01-04
Payer: COMMERCIAL

## 2024-01-04 PROCEDURE — 90837 PSYTX W PT 60 MINUTES: CPT | Mod: 95

## 2024-01-04 NOTE — REASON FOR VISIT
[Patient preference] : as per patient preference [Continuity of care] : to ensure continuity of care [Continuing, patient not seen in-person within last 12 months (provide details below)] : Telehealth services are continuing, patient not seen in-person within last 12 months.  [Telehealth (audio & video) - Individual/Group] : This visit was provided via telehealth using real-time 2-way audio visual technology. [Other Location: e.g. Home (Enter Location, City,State)___] : The patient, [unfilled], was located at [unfilled] at the time of the visit. [FreeTextEntry4] : 1:00PM [FreeTextEntry5] : 2:00PM [Patient] : Patient [FreeTextEntry1] : anxiety

## 2024-01-04 NOTE — PLAN
[FreeTextEntry2] : Pt seeks to decrease anxiety symptoms.  [Cognitive and/or Behavior Therapy] : Cognitive and/or Behavior Therapy  [Psychoeducation] : Psychoeducation  [de-identified] : Psychologist met with Pt using teletherapy platform. Pt was at their home while psychologist was working remotely via [facility]. Pt continued to provide consent for telehealth services.   Affect was observed to be full. Mood was reported as unchanged. Pt was responsive, receptive, and engaged throughout session. Evidence of risk was neither observed nor reported.   Session involved discussion about thoughts, feelings, and Pt responses regarding an upcoming transition in employment. Appointment also included psychoeducation about worry (e.g., worry decision tree) and goal setting, as well as Pt values (which will be revisited in the following appointment). [Return in ____ week(s)] : Return in [unfilled] week(s) [FreeTextEntry1] : Psychoeducation about Diagnosis & Treatment Model  1. Pt will learn about their symptoms and diagnosis and be able to explain it back to psychologist.  2. Pt will learn about the CBT model and be able to accurately categorize their symptoms and diagnosis in terms of cognition, emotional responses, behaviors, and physiological arousal.   Cognitive Therapy Interventions   1. Pt can benefit from learning and practicing cognitive restructuring, including learning to categorize automatic thoughts/cognitive distortions, judgments, biases, and attributions   2. Pt can benefit from using cognitive reframing to challenge cognitive distortions to increase flexibility in thinking   3. Pt can benefit from using self-coping statements to encourage themselves and provide validation   4. Cognitive restructuring will be learned and practiced within session, and assigned for homework between sessions. This will entail interventions such as mood monitoring and thought record forms, to challenge cognitive distortions in real-time.     Relaxation Techniques  1. Pt can benefit from learning and practicing Relaxation Techniques to reduce physiological arousal, including flight/flight/freeze responses, manifested by muscle tightness or tension; emotions including anxiety and panic, sadness and depression, and anger.  2. Relaxation techniques can include any or all of the following: Diaphragmatic Breathing (DB), Progressive Muscle Relaxation (PMR), Imaginal Relaxation (IMR), or mindfulness.  3. Relaxation techniques will be learned and practiced within sessions, and assigned for homework between sessions. This will entail interventions such using monitoring forms to track their stress and other symptoms, and assess the efficacy of their implementation of relaxation techniques.   Exposure Therapy Interventions   1. Pt can benefit from learning/continuing exposure therapy interventions, involving confronting situational, emotional, physiological, and cognitive avoided situations.   2. Pt can benefit from reducing and eliminating safety behaviors/objects, such as maladaptive response/ritualization prevention, removing "just in case" items that maintain anxiety, panic, obsessive, and post-traumatic symptoms.   3. Exposure therapy involves any or all of these modalities: in vivo, imaginal, interoceptive, Virtual Reality, written/narrative, and analog in vivo exposure   4. Exposures will be conducted within psychotherapy sessions and assigned for homework between sessions

## 2024-01-04 NOTE — END OF VISIT
[Duration of Psychotherapy Visit (minutes spent in synchronous communication): ____] : Duration of Psychotherapy Visit (minutes spent in synchronous communication): [unfilled] [Individual Psychotherapy for 53+ minutes] : Individual Psychotherapy for 53+ minutes  [Teletherapy Service Provided] : The services provided in this session were delivered via tele-therapy [FreeTextEntry3] : Work  [FreeTextEntry5] : Remote office via [facility]

## 2024-01-09 ENCOUNTER — NON-APPOINTMENT (OUTPATIENT)
Age: 54
End: 2024-01-09

## 2024-01-10 ENCOUNTER — APPOINTMENT (OUTPATIENT)
Dept: UROLOGY | Facility: CLINIC | Age: 54
End: 2024-01-10
Payer: COMMERCIAL

## 2024-01-10 VITALS
HEART RATE: 80 BPM | DIASTOLIC BLOOD PRESSURE: 83 MMHG | SYSTOLIC BLOOD PRESSURE: 137 MMHG | RESPIRATION RATE: 16 BRPM | OXYGEN SATURATION: 99 % | TEMPERATURE: 97.5 F

## 2024-01-10 PROCEDURE — G2211 COMPLEX E/M VISIT ADD ON: CPT

## 2024-01-10 PROCEDURE — 99213 OFFICE O/P EST LOW 20 MIN: CPT

## 2024-01-11 ENCOUNTER — APPOINTMENT (OUTPATIENT)
Dept: PSYCHIATRY | Facility: CLINIC | Age: 54
End: 2024-01-11
Payer: COMMERCIAL

## 2024-01-11 LAB — PROSTATE BIOPSY: NORMAL

## 2024-01-11 PROCEDURE — 90837 PSYTX W PT 60 MINUTES: CPT | Mod: 95

## 2024-01-11 NOTE — PHYSICAL EXAM
[General Appearance - Well Developed] : well developed [General Appearance - Well Nourished] : well nourished [General Appearance - In No Acute Distress] : no acute distress [Exaggerated Use Of Accessory Muscles For Inspiration] : no accessory muscle use [Abdomen Soft] : soft [Abdomen Tenderness] : non-tender [Normal Station and Gait] : the gait and station were normal for the patient's age [Oriented To Time, Place, And Person] : oriented to person, place, and time

## 2024-01-11 NOTE — HISTORY OF PRESENT ILLNESS
[FreeTextEntry1] : 53-year-old Black man with a PMHx of HIV, CLARI, GERD, HPV, hypertension, hyperlipidemia, and sickle cell trait who presents today for elevated PSA. His PSA is 4.60, drawn on 10/20/23. His historical PSA results are detailed below. He has not had a pelvic MRI nor a prostate biopsy. Maternal uncle x2 with prostate ca (possibly one in late 60s). Patient denies weak stream, straining, nocturia, dysuria, hematuria, hesitancy, or intermittency. Occasional urgency, frequency, and dribbling. Nocturia x 1. Has hx of severe CLARI on BiPAP.   Plan made for MRI for eval. This showed 37.8cc prostate. Has single PiRAD 4 lesion. He underwent bx. Here today to review path. This shows 6/13 biopsies positive with GG1-3. GG3 (4+3=7) in L posterior medial base random bx. Target in L PZpm (10mm) returned as GG2 and aditional L lateral core with 2. Remaining cores with GG1 all on L.   No prior hx of abd surgery. PMHx also significant for perianal SCC in situ being monitored by Dr Tucker.   ED -no morning erections; cannot maintain erection for intercourse. low interest   PSA Hx 4.60 NG/ML on 10/20/2023 3.50 NG/ML on 05/05/2023 2.53 NG/ML on 11/04/2022 2.73 NG/ML on 05/06/2022 1.52 NG/ML on 11/06/2020 1.35 NG/ML on 07/13/2020 1.15 NG/ML on 01/17/2020 1.09 NG/ML on 01/11/2019 1.19 NG/ML on 09/21/2018 0.57 NG/ML on 05/26/2017 0.83 NG/ML on 01/13/2017 1.35 NG/ML on 09/30/2015

## 2024-01-11 NOTE — ASSESSMENT
[FreeTextEntry1] : New diagnosis of unfavorable intermediate risk prostate cancer. Discussed that with this pathology, patients age and comorbidities, treatment is recommended. Discussed management options broadly including radiation and surgery. Discussed short and long term risks of radiation including but not limited to erectile dysfunction, voiding dysfunction (urgency/frequency/incontinence), bowel sx, long term risk of secondary malignancies. Discussed surgical treatment options with radical prostatectomy. Discussed steps of the procedures and risks. Short term risks include bleeding, infection, injury to intra-abdominal contents, conversion to open, urine leak. Discussed long term risks including erectile dysfunction, urinary sx (stress incontinence), loss of ejaculation of seminal fluid. Given pts age as well as his hx of perianal SCC, would favor surgery over radiation.  --Refer to Dr Bruner for robotic assisted laparoscopic radical prostatectomy +LND

## 2024-01-12 NOTE — REASON FOR VISIT
[Patient preference] : as per patient preference [Continuing, patient not seen in-person within last 12 months (provide details below)] : Telehealth services are continuing, patient not seen in-person within last 12 months.  [Continuity of care] : to ensure continuity of care [Telehealth (audio & video) - Individual/Group] : This visit was provided via telehealth using real-time 2-way audio visual technology. [Other Location: e.g. Home (Enter Location, City,State)___] : The provider was located at [unfilled]. [Patient] : Patient [Home] : The patient, [unfilled], was located at home, [unfilled], at the time of the visit. [FreeTextEntry4] : 5:00PM [FreeTextEntry5] : 6:00PM [FreeTextEntry1] : anxiety

## 2024-01-12 NOTE — PLAN
[Cognitive and/or Behavior Therapy] : Cognitive and/or Behavior Therapy  [Psychoeducation] : Psychoeducation  [Return in ____ week(s)] : Return in [unfilled] week(s) [FreeTextEntry2] : Pt seeks to decrease anxiety symptoms.  [de-identified] : Psychologist met with Pt using teletherapy platform. Pt was at their home while psychologist was working remotely via [facility]. Pt continued to provide consent for telehealth services.   Affect was observed to be full. Mood was reported as unchanged. Pt was responsive, receptive, and engaged throughout session. Evidence of risk was neither observed nor reported.   Session involved review of recent stressors and medical issues, psychoeducation regarding mindfulness (e.g., describe skill; guided meditation) and breathing exercises, which Pt will practice for hw. [FreeTextEntry1] : Psychoeducation about Diagnosis & Treatment Model  1. Pt will learn about their symptoms and diagnosis and be able to explain it back to psychologist.  2. Pt will learn about the CBT model and be able to accurately categorize their symptoms and diagnosis in terms of cognition, emotional responses, behaviors, and physiological arousal.   Cognitive Therapy Interventions   1. Pt can benefit from learning and practicing cognitive restructuring, including learning to categorize automatic thoughts/cognitive distortions, judgments, biases, and attributions   2. Pt can benefit from using cognitive reframing to challenge cognitive distortions to increase flexibility in thinking   3. Pt can benefit from using self-coping statements to encourage themselves and provide validation   4. Cognitive restructuring will be learned and practiced within session, and assigned for homework between sessions. This will entail interventions such as mood monitoring and thought record forms, to challenge cognitive distortions in real-time.     Relaxation Techniques  1. Pt can benefit from learning and practicing Relaxation Techniques to reduce physiological arousal, including flight/flight/freeze responses, manifested by muscle tightness or tension; emotions including anxiety and panic, sadness and depression, and anger.  2. Relaxation techniques can include any or all of the following: Diaphragmatic Breathing (DB), Progressive Muscle Relaxation (PMR), Imaginal Relaxation (IMR), or mindfulness.  3. Relaxation techniques will be learned and practiced within sessions, and assigned for homework between sessions. This will entail interventions such using monitoring forms to track their stress and other symptoms, and assess the efficacy of their implementation of relaxation techniques.   Exposure Therapy Interventions   1. Pt can benefit from learning/continuing exposure therapy interventions, involving confronting situational, emotional, physiological, and cognitive avoided situations.   2. Pt can benefit from reducing and eliminating safety behaviors/objects, such as maladaptive response/ritualization prevention, removing "just in case" items that maintain anxiety, panic, obsessive, and post-traumatic symptoms.   3. Exposure therapy involves any or all of these modalities: in vivo, imaginal, interoceptive, Virtual Reality, written/narrative, and analog in vivo exposure   4. Exposures will be conducted within psychotherapy sessions and assigned for homework between sessions

## 2024-01-12 NOTE — END OF VISIT
[Duration of Psychotherapy Visit (minutes spent in synchronous communication): ____] : Duration of Psychotherapy Visit (minutes spent in synchronous communication): [unfilled] [Individual Psychotherapy for 53+ minutes] : Individual Psychotherapy for 53+ minutes  [Teletherapy Service Provided] : The services provided in this session were delivered via tele-therapy [FreeTextEntry3] : Home [FreeTextEntry5] : Remote office via [facility]

## 2024-01-19 ENCOUNTER — NON-APPOINTMENT (OUTPATIENT)
Age: 54
End: 2024-01-19

## 2024-01-24 ENCOUNTER — APPOINTMENT (OUTPATIENT)
Dept: UROLOGY | Facility: CLINIC | Age: 54
End: 2024-01-24
Payer: COMMERCIAL

## 2024-01-24 VITALS — HEART RATE: 109 BPM | SYSTOLIC BLOOD PRESSURE: 150 MMHG | DIASTOLIC BLOOD PRESSURE: 81 MMHG

## 2024-01-24 PROCEDURE — 99215 OFFICE O/P EST HI 40 MIN: CPT

## 2024-01-24 NOTE — HISTORY OF PRESENT ILLNESS
[FreeTextEntry1] : Patient here to discuss robotic prostate surgery.  Patient would not like radiation because in 2021 patient had rectal cancer scan with 2 lesions detected near anus s/p excision by Dr. Tucker. Discussed with Dr. Torres did not want radiation to possibly "reactivate" any potentially cancerous lesions that were in the area.  Nocturia 2x, improved with CPAP machine. Baseline erectile function is good, able to have penetrative sex and climax normally, does endorse slightly lower libido. Prescribed low dose cialis qD but last took 3wks ago. Denies incontinence, leakage of urine, hematuria, foul smelling urine, fevers/chills, dysuria Endorses some urgency and frequency unchanged from previously and not bothersome  Fhx: No prostate cancer, but 2 maternal uncles with enlarged prostates Sxhx: Hammer toe surgery 2wks ago, Anal lesions x2 removal by Dr. Tucker in 2021 SH: former smoker (15-20yrs ago, 1pack/2wks), did not grow up in smoking household, no drugs, social alcohol, works as  Meds: anti-HTN, Genvoya, no blood thinners

## 2024-01-24 NOTE — PHYSICAL EXAM
[Normal] : no focal deficits [FreeTextEntry1] : Discussed R/B robotic surgery and expectations. Patient amenable, will schedule surgery

## 2024-01-25 ENCOUNTER — APPOINTMENT (OUTPATIENT)
Dept: PSYCHIATRY | Facility: CLINIC | Age: 54
End: 2024-01-25
Payer: COMMERCIAL

## 2024-01-25 PROCEDURE — 90837 PSYTX W PT 60 MINUTES: CPT | Mod: 95

## 2024-01-25 NOTE — PLAN
[FreeTextEntry2] : Pt seeks to decrease anxiety symptoms.  [Psychoeducation] : Psychoeducation  [Cognitive and/or Behavior Therapy] : Cognitive and/or Behavior Therapy  [FreeTextEntry1] : Psychoeducation about Diagnosis & Treatment Model  1. Pt will learn about their symptoms and diagnosis and be able to explain it back to psychologist.  2. Pt will learn about the CBT model and be able to accurately categorize their symptoms and diagnosis in terms of cognition, emotional responses, behaviors, and physiological arousal.   Cognitive Therapy Interventions   1. Pt can benefit from learning and practicing cognitive restructuring, including learning to categorize automatic thoughts/cognitive distortions, judgments, biases, and attributions   2. Pt can benefit from using cognitive reframing to challenge cognitive distortions to increase flexibility in thinking   3. Pt can benefit from using self-coping statements to encourage themselves and provide validation   4. Cognitive restructuring will be learned and practiced within session, and assigned for homework between sessions. This will entail interventions such as mood monitoring and thought record forms, to challenge cognitive distortions in real-time.     Relaxation Techniques  1. Pt can benefit from learning and practicing Relaxation Techniques to reduce physiological arousal, including flight/flight/freeze responses, manifested by muscle tightness or tension; emotions including anxiety and panic, sadness and depression, and anger.  2. Relaxation techniques can include any or all of the following: Diaphragmatic Breathing (DB), Progressive Muscle Relaxation (PMR), Imaginal Relaxation (IMR), or mindfulness.  3. Relaxation techniques will be learned and practiced within sessions, and assigned for homework between sessions. This will entail interventions such using monitoring forms to track their stress and other symptoms, and assess the efficacy of their implementation of relaxation techniques.   Exposure Therapy Interventions   1. Pt can benefit from learning/continuing exposure therapy interventions, involving confronting situational, emotional, physiological, and cognitive avoided situations.   2. Pt can benefit from reducing and eliminating safety behaviors/objects, such as maladaptive response/ritualization prevention, removing "just in case" items that maintain anxiety, panic, obsessive, and post-traumatic symptoms.   3. Exposure therapy involves any or all of these modalities: in vivo, imaginal, interoceptive, Virtual Reality, written/narrative, and analog in vivo exposure   4. Exposures will be conducted within psychotherapy sessions and assigned for homework between sessions   [de-identified] : Psychologist met with Pt using teletherapy platform. Pt was at their home while psychologist was working remotely via [facility]. Pt continued to provide consent for telehealth services.   Affect was observed to be full. Mood was reported as unchanged. Pt was responsive, receptive, and engaged throughout session. Evidence of risk was neither observed nor reported.   Session involved review of recent stressors and loss, as well as practice developing a mindfulness script. Psychoeducation regarding mindfulness and paired-muscle relaxation were also integrated. The utility of social support was also briefly discussed, and strategies to seek support will be revisited in the following session. Pt will continue practicing relaxation skills from session for hw.  [Return in ____ week(s)] : Return in [unfilled] week(s)

## 2024-01-25 NOTE — PHYSICAL EXAM
[Cooperative] : cooperative [Euthymic] : euthymic [Full] : full [Linear/Goal Directed] : linear/goal directed [Clear] : clear [None] : none [None Reported] : none reported [Average] : average [WNL] : within normal limits [Not applicable] : not applicable

## 2024-01-25 NOTE — H&P PST ADULT - PROBLEM SELECTOR PLAN 1
Spoke to patient regarding appt.     ----- Message from Js Sanabria sent at 1/25/2024 12:54 PM CST -----  Contact: Self  Type:  Patient Returning Call    Who Called:  PT  Who Left Message for Patient:  Christy  Does the patient know what this is regarding?: Yes  Best Call Back Number: [phone] (work) [phone]    Additional Information:      
scheduled Excision of anal mass x 2 on 12/21/22.  -preop instructions given  -labs: CBC, BMP  -LAst Cardiac note in chart, with EKG-  -CLARI report in chart

## 2024-01-25 NOTE — END OF VISIT
[Duration of Psychotherapy Visit (minutes spent in synchronous communication): ____] : Duration of Psychotherapy Visit (minutes spent in synchronous communication): [unfilled] [Individual Psychotherapy for 53+ minutes] : Individual Psychotherapy for 53+ minutes  [Teletherapy Service Provided] : The services provided in this session were delivered via tele-therapy [FreeTextEntry3] : Home [FreeTextEntry5] : Remote office via [facility]

## 2024-01-25 NOTE — REASON FOR VISIT
[Patient preference] : as per patient preference [Continuity of care] : to ensure continuity of care [Continuing, patient not seen in-person within last 12 months (provide details below)] : Telehealth services are continuing, patient not seen in-person within last 12 months.  [Telehealth (audio & video) - Individual/Group] : This visit was provided via telehealth using real-time 2-way audio visual technology. [Other Location: e.g. Home (Enter Location, City,State)___] : The provider was located at [unfilled]. [Home] : The patient, [unfilled], was located at home, [unfilled], at the time of the visit. [FreeTextEntry4] : 5:00PM [FreeTextEntry5] : 6:00PM [Patient] : Patient [FreeTextEntry1] : anxiety

## 2024-02-01 ENCOUNTER — APPOINTMENT (OUTPATIENT)
Dept: PSYCHIATRY | Facility: CLINIC | Age: 54
End: 2024-02-01
Payer: MEDICAID

## 2024-02-01 PROCEDURE — 90837 PSYTX W PT 60 MINUTES: CPT | Mod: 95

## 2024-02-01 NOTE — PLAN
[FreeTextEntry2] : Pt seeks to decrease anxiety symptoms.  [Cognitive and/or Behavior Therapy] : Cognitive and/or Behavior Therapy  [Psychoeducation] : Psychoeducation  [de-identified] : Psychologist met with Pt using teletherapy platform. Pt was at their home while psychologist was working remotely via [facility]. Pt continued to provide consent for telehealth services.   Affect was observed to be full. Mood was reported as unchanged. Pt was responsive, receptive, and engaged throughout session. Evidence of risk was neither observed nor reported.   Session involved review of recent stressors, discussion about feeling "melancholy" recently, psychoeducation regarding building motivation via pleasant activities, and introduction of an activity list to identify other activities of possible interest. Distinguishing goals of certain activities/creating realistic goals was also discussed, and an in-session exercise where Pt completed a task was also included. Pt will review the activity list and complete behavioral targets (e.g., socializing) for hw.  [Return in ____ week(s)] : Return in [unfilled] week(s) [FreeTextEntry1] : Psychoeducation about Diagnosis & Treatment Model  1. Pt will learn about their symptoms and diagnosis and be able to explain it back to psychologist.  2. Pt will learn about the CBT model and be able to accurately categorize their symptoms and diagnosis in terms of cognition, emotional responses, behaviors, and physiological arousal.   Cognitive Therapy Interventions   1. Pt can benefit from learning and practicing cognitive restructuring, including learning to categorize automatic thoughts/cognitive distortions, judgments, biases, and attributions   2. Pt can benefit from using cognitive reframing to challenge cognitive distortions to increase flexibility in thinking   3. Pt can benefit from using self-coping statements to encourage themselves and provide validation   4. Cognitive restructuring will be learned and practiced within session, and assigned for homework between sessions. This will entail interventions such as mood monitoring and thought record forms, to challenge cognitive distortions in real-time.     Relaxation Techniques  1. Pt can benefit from learning and practicing Relaxation Techniques to reduce physiological arousal, including flight/flight/freeze responses, manifested by muscle tightness or tension; emotions including anxiety and panic, sadness and depression, and anger.  2. Relaxation techniques can include any or all of the following: Diaphragmatic Breathing (DB), Progressive Muscle Relaxation (PMR), Imaginal Relaxation (IMR), or mindfulness.  3. Relaxation techniques will be learned and practiced within sessions, and assigned for homework between sessions. This will entail interventions such using monitoring forms to track their stress and other symptoms, and assess the efficacy of their implementation of relaxation techniques.   Exposure Therapy Interventions   1. Pt can benefit from learning/continuing exposure therapy interventions, involving confronting situational, emotional, physiological, and cognitive avoided situations.   2. Pt can benefit from reducing and eliminating safety behaviors/objects, such as maladaptive response/ritualization prevention, removing "just in case" items that maintain anxiety, panic, obsessive, and post-traumatic symptoms.   3. Exposure therapy involves any or all of these modalities: in vivo, imaginal, interoceptive, Virtual Reality, written/narrative, and analog in vivo exposure   4. Exposures will be conducted within psychotherapy sessions and assigned for homework between sessions

## 2024-02-01 NOTE — END OF VISIT
[Duration of Psychotherapy Visit (minutes spent in synchronous communication): ____] : Duration of Psychotherapy Visit (minutes spent in synchronous communication): [unfilled] [Individual Psychotherapy for 53+ minutes] : Individual Psychotherapy for 53+ minutes  [Teletherapy Service Provided] : The services provided in this session were delivered via tele-therapy [FreeTextEntry5] : Remote office via [facility] [FreeTextEntry3] : Home

## 2024-02-08 ENCOUNTER — APPOINTMENT (OUTPATIENT)
Dept: PSYCHIATRY | Facility: CLINIC | Age: 54
End: 2024-02-08
Payer: MEDICAID

## 2024-02-08 PROCEDURE — 90837 PSYTX W PT 60 MINUTES: CPT | Mod: 95

## 2024-02-09 NOTE — END OF VISIT
[Duration of Psychotherapy Visit (minutes spent in synchronous communication): ____] : Duration of Psychotherapy Visit (minutes spent in synchronous communication): [unfilled] [Individual Psychotherapy for 53+ minutes] : Individual Psychotherapy for 53+ minutes  [Teletherapy Service Provided] : The services provided in this session were delivered via tele-therapy [FreeTextEntry3] : Home [FreeTextEntry5] : Remote office via [facility]

## 2024-02-09 NOTE — PLAN
[FreeTextEntry2] : Pt seeks to decrease anxiety symptoms.  [Cognitive and/or Behavior Therapy] : Cognitive and/or Behavior Therapy  [Psychoeducation] : Psychoeducation  [de-identified] : Psychologist met with Pt using teletherapy platform. Pt was at their home while psychologist was working remotely via [facility]. Pt continued to provide consent for telehealth services.   Affect was observed to be full. Mood was reported as unchanged. Pt was responsive, receptive, and engaged throughout session. Evidence of risk was neither observed nor reported.   Session involved review of antecedents to distress, discussion about weekly goal setting, and development of a daily/weekly schedule. Pt will complete goals identified in session for hw.  [Return in ____ week(s)] : Return in [unfilled] week(s) [FreeTextEntry1] : Psychoeducation about Diagnosis & Treatment Model  1. Pt will learn about their symptoms and diagnosis and be able to explain it back to psychologist.  2. Pt will learn about the CBT model and be able to accurately categorize their symptoms and diagnosis in terms of cognition, emotional responses, behaviors, and physiological arousal.   Cognitive Therapy Interventions   1. Pt can benefit from learning and practicing cognitive restructuring, including learning to categorize automatic thoughts/cognitive distortions, judgments, biases, and attributions   2. Pt can benefit from using cognitive reframing to challenge cognitive distortions to increase flexibility in thinking   3. Pt can benefit from using self-coping statements to encourage themselves and provide validation   4. Cognitive restructuring will be learned and practiced within session, and assigned for homework between sessions. This will entail interventions such as mood monitoring and thought record forms, to challenge cognitive distortions in real-time.     Relaxation Techniques  1. Pt can benefit from learning and practicing Relaxation Techniques to reduce physiological arousal, including flight/flight/freeze responses, manifested by muscle tightness or tension; emotions including anxiety and panic, sadness and depression, and anger.  2. Relaxation techniques can include any or all of the following: Diaphragmatic Breathing (DB), Progressive Muscle Relaxation (PMR), Imaginal Relaxation (IMR), or mindfulness.  3. Relaxation techniques will be learned and practiced within sessions, and assigned for homework between sessions. This will entail interventions such using monitoring forms to track their stress and other symptoms, and assess the efficacy of their implementation of relaxation techniques.   Exposure Therapy Interventions   1. Pt can benefit from learning/continuing exposure therapy interventions, involving confronting situational, emotional, physiological, and cognitive avoided situations.   2. Pt can benefit from reducing and eliminating safety behaviors/objects, such as maladaptive response/ritualization prevention, removing "just in case" items that maintain anxiety, panic, obsessive, and post-traumatic symptoms.   3. Exposure therapy involves any or all of these modalities: in vivo, imaginal, interoceptive, Virtual Reality, written/narrative, and analog in vivo exposure   4. Exposures will be conducted within psychotherapy sessions and assigned for homework between sessions

## 2024-02-11 ENCOUNTER — RX RENEWAL (OUTPATIENT)
Age: 54
End: 2024-02-11

## 2024-02-15 ENCOUNTER — APPOINTMENT (OUTPATIENT)
Dept: PSYCHIATRY | Facility: CLINIC | Age: 54
End: 2024-02-15
Payer: MEDICAID

## 2024-02-15 PROCEDURE — 90837 PSYTX W PT 60 MINUTES: CPT | Mod: 95

## 2024-02-16 NOTE — PLAN
[FreeTextEntry2] : Pt seeks to decrease anxiety symptoms.  [Cognitive and/or Behavior Therapy] : Cognitive and/or Behavior Therapy  [Psychoeducation] : Psychoeducation  [de-identified] : Psychologist met with Pt using teletherapy platform. Pt was at their home while psychologist was working remotely via [facility]. Pt continued to provide consent for telehealth services.   Affect was observed to be full. Mood was reported as unchanged. Pt was responsive, receptive, and engaged throughout session. Evidence of risk was neither observed nor reported.   Session involved review of hw, which Pt reported completing. Cognitions and emotions related to recent stressors were identified, as well as Pt responses. Hourly schedule was revisited for the following week, and values clarification exercise was integrated in-session. Pt will complete behavioral goals from session for hw.  [Return in ____ week(s)] : Return in [unfilled] week(s) [FreeTextEntry1] : Psychoeducation about Diagnosis & Treatment Model  1. Pt will learn about their symptoms and diagnosis and be able to explain it back to psychologist.  2. Pt will learn about the CBT model and be able to accurately categorize their symptoms and diagnosis in terms of cognition, emotional responses, behaviors, and physiological arousal.   Cognitive Therapy Interventions   1. Pt can benefit from learning and practicing cognitive restructuring, including learning to categorize automatic thoughts/cognitive distortions, judgments, biases, and attributions   2. Pt can benefit from using cognitive reframing to challenge cognitive distortions to increase flexibility in thinking   3. Pt can benefit from using self-coping statements to encourage themselves and provide validation   4. Cognitive restructuring will be learned and practiced within session, and assigned for homework between sessions. This will entail interventions such as mood monitoring and thought record forms, to challenge cognitive distortions in real-time.     Relaxation Techniques  1. Pt can benefit from learning and practicing Relaxation Techniques to reduce physiological arousal, including flight/flight/freeze responses, manifested by muscle tightness or tension; emotions including anxiety and panic, sadness and depression, and anger.  2. Relaxation techniques can include any or all of the following: Diaphragmatic Breathing (DB), Progressive Muscle Relaxation (PMR), Imaginal Relaxation (IMR), or mindfulness.  3. Relaxation techniques will be learned and practiced within sessions, and assigned for homework between sessions. This will entail interventions such using monitoring forms to track their stress and other symptoms, and assess the efficacy of their implementation of relaxation techniques.   Exposure Therapy Interventions   1. Pt can benefit from learning/continuing exposure therapy interventions, involving confronting situational, emotional, physiological, and cognitive avoided situations.   2. Pt can benefit from reducing and eliminating safety behaviors/objects, such as maladaptive response/ritualization prevention, removing "just in case" items that maintain anxiety, panic, obsessive, and post-traumatic symptoms.   3. Exposure therapy involves any or all of these modalities: in vivo, imaginal, interoceptive, Virtual Reality, written/narrative, and analog in vivo exposure   4. Exposures will be conducted within psychotherapy sessions and assigned for homework between sessions

## 2024-02-16 NOTE — END OF VISIT
[Duration of Psychotherapy Visit (minutes spent in synchronous communication): ____] : Duration of Psychotherapy Visit (minutes spent in synchronous communication): [unfilled] [Individual Psychotherapy for 53+ minutes] : Individual Psychotherapy for 53+ minutes  [Teletherapy Service Provided] : The services provided in this session were delivered via tele-therapy [FreeTextEntry3] : Home [FreeTextEntry5] : Remote office via [facility]

## 2024-02-22 ENCOUNTER — APPOINTMENT (OUTPATIENT)
Dept: PSYCHIATRY | Facility: CLINIC | Age: 54
End: 2024-02-22
Payer: MEDICAID

## 2024-02-22 PROCEDURE — 90837 PSYTX W PT 60 MINUTES: CPT | Mod: 95

## 2024-02-22 NOTE — REASON FOR VISIT
[Patient preference] : as per patient preference [Continuing, patient not seen in-person within last 12 months (provide details below)] : Telehealth services are continuing, patient not seen in-person within last 12 months.  [Continuity of care] : to ensure continuity of care [Other Location: e.g. Home (Enter Location, City,State)___] : The provider was located at [unfilled]. [Telehealth (audio & video) - Individual/Group] : This visit was provided via telehealth using real-time 2-way audio visual technology. [FreeTextEntry5] : 6:00PM [Home] : The patient, [unfilled], was located at home, [unfilled], at the time of the visit. [FreeTextEntry4] : 5:00PM [FreeTextEntry1] : anxiety [Patient] : Patient

## 2024-02-22 NOTE — PLAN
[Cognitive and/or Behavior Therapy] : Cognitive and/or Behavior Therapy  [FreeTextEntry2] : Pt seeks to decrease anxiety symptoms.  [Return in ____ week(s)] : Return in [unfilled] week(s) [de-identified] : Psychologist met with Pt using teletherapy platform. Pt was at their home while psychologist was working remotely via [facility]. Pt continued to provide consent for telehealth services.   Affect was observed to be full. Mood was reported as unchanged. Pt was responsive, receptive, and engaged throughout session. Evidence of risk was neither observed nor reported.   Session involved review of hw, which Pt reported completing. Cognitions, emotions, and outcomes to behavioral targets were discussed. Pt's experience of physical symptoms were also discussed.  Values were revisited, and behavioral targets for the following week were identified. Mindfulness skills will be revisited in the next session. [Psychoeducation] : Psychoeducation  [FreeTextEntry1] : Psychoeducation about Diagnosis & Treatment Model  1. Pt will learn about their symptoms and diagnosis and be able to explain it back to psychologist.  2. Pt will learn about the CBT model and be able to accurately categorize their symptoms and diagnosis in terms of cognition, emotional responses, behaviors, and physiological arousal.   Cognitive Therapy Interventions   1. Pt can benefit from learning and practicing cognitive restructuring, including learning to categorize automatic thoughts/cognitive distortions, judgments, biases, and attributions   2. Pt can benefit from using cognitive reframing to challenge cognitive distortions to increase flexibility in thinking   3. Pt can benefit from using self-coping statements to encourage themselves and provide validation   4. Cognitive restructuring will be learned and practiced within session, and assigned for homework between sessions. This will entail interventions such as mood monitoring and thought record forms, to challenge cognitive distortions in real-time.     Relaxation Techniques  1. Pt can benefit from learning and practicing Relaxation Techniques to reduce physiological arousal, including flight/flight/freeze responses, manifested by muscle tightness or tension; emotions including anxiety and panic, sadness and depression, and anger.  2. Relaxation techniques can include any or all of the following: Diaphragmatic Breathing (DB), Progressive Muscle Relaxation (PMR), Imaginal Relaxation (IMR), or mindfulness.  3. Relaxation techniques will be learned and practiced within sessions, and assigned for homework between sessions. This will entail interventions such using monitoring forms to track their stress and other symptoms, and assess the efficacy of their implementation of relaxation techniques.   Exposure Therapy Interventions   1. Pt can benefit from learning/continuing exposure therapy interventions, involving confronting situational, emotional, physiological, and cognitive avoided situations.   2. Pt can benefit from reducing and eliminating safety behaviors/objects, such as maladaptive response/ritualization prevention, removing "just in case" items that maintain anxiety, panic, obsessive, and post-traumatic symptoms.   3. Exposure therapy involves any or all of these modalities: in vivo, imaginal, interoceptive, Virtual Reality, written/narrative, and analog in vivo exposure   4. Exposures will be conducted within psychotherapy sessions and assigned for homework between sessions

## 2024-02-22 NOTE — END OF VISIT
[Duration of Psychotherapy Visit (minutes spent in synchronous communication): ____] : Duration of Psychotherapy Visit (minutes spent in synchronous communication): [unfilled] [Individual Psychotherapy for 53+ minutes] : Individual Psychotherapy for 53+ minutes  [Teletherapy Service Provided] : The services provided in this session were delivered via tele-therapy [FreeTextEntry3] : Home [FreeTextEntry5] : Remote office via [facility]

## 2024-02-22 NOTE — PHYSICAL EXAM
[Cooperative] : cooperative [Euthymic] : euthymic [Linear/Goal Directed] : linear/goal directed [Clear] : clear [Full] : full [None] : none [None Reported] : none reported [WNL] : within normal limits [Average] : average [Not applicable] : not applicable

## 2024-02-29 ENCOUNTER — APPOINTMENT (OUTPATIENT)
Dept: PSYCHIATRY | Facility: CLINIC | Age: 54
End: 2024-02-29
Payer: MEDICAID

## 2024-02-29 PROCEDURE — 90837 PSYTX W PT 60 MINUTES: CPT | Mod: 95

## 2024-03-01 NOTE — END OF VISIT
[Duration of Psychotherapy Visit (minutes spent in synchronous communication): ____] : Duration of Psychotherapy Visit (minutes spent in synchronous communication): [unfilled] [Teletherapy Service Provided] : The services provided in this session were delivered via tele-therapy [Individual Psychotherapy for 53+ minutes] : Individual Psychotherapy for 53+ minutes  [FreeTextEntry3] : Home [FreeTextEntry5] : Remote office via [facility]

## 2024-03-01 NOTE — REASON FOR VISIT
[Patient preference] : as per patient preference [Continuity of care] : to ensure continuity of care [Continuing, patient not seen in-person within last 12 months (provide details below)] : Telehealth services are continuing, patient not seen in-person within last 12 months.  [Telehealth (audio & video) - Individual/Group] : This visit was provided via telehealth using real-time 2-way audio visual technology. [Other Location: e.g. Home (Enter Location, City,State)___] : The provider was located at [unfilled]. [Home] : The patient, [unfilled], was located at home, [unfilled], at the time of the visit. [Patient] : Patient [FreeTextEntry4] : 5:00PM [FreeTextEntry5] : 6:00PM [FreeTextEntry1] : anxiety

## 2024-03-01 NOTE — PLAN
[Cognitive and/or Behavior Therapy] : Cognitive and/or Behavior Therapy  [Psychoeducation] : Psychoeducation  [Return in ____ week(s)] : Return in [unfilled] week(s) [FreeTextEntry2] : Pt seeks to decrease anxiety symptoms.  [de-identified] : Psychologist met with Pt using teletherapy platform. Pt was at their home while psychologist was working remotely via [facility]. Pt continued to provide consent for telehealth services.   Affect was observed to be full. Mood was reported as unchanged. Pt was responsive, receptive, and engaged throughout session. Evidence of risk was neither observed nor reported.   Goals of the session were to review completed activities and skills practiced, identify thoughts and emotions associated with employment concerns, and revisit adaptive coping strategies regarding employment and an upcoming medical procedure. Cognitive restructuring skills (e.g., cognitive reframing; evidence for thoughts) and mindfulness skills were integrated. Pt will continue practicing relaxation and coping skills for hw.  [FreeTextEntry1] : Psychoeducation about Diagnosis & Treatment Model  1. Pt will learn about their symptoms and diagnosis and be able to explain it back to psychologist.  2. Pt will learn about the CBT model and be able to accurately categorize their symptoms and diagnosis in terms of cognition, emotional responses, behaviors, and physiological arousal.   Cognitive Therapy Interventions   1. Pt can benefit from learning and practicing cognitive restructuring, including learning to categorize automatic thoughts/cognitive distortions, judgments, biases, and attributions   2. Pt can benefit from using cognitive reframing to challenge cognitive distortions to increase flexibility in thinking   3. Pt can benefit from using self-coping statements to encourage themselves and provide validation   4. Cognitive restructuring will be learned and practiced within session, and assigned for homework between sessions. This will entail interventions such as mood monitoring and thought record forms, to challenge cognitive distortions in real-time.     Relaxation Techniques  1. Pt can benefit from learning and practicing Relaxation Techniques to reduce physiological arousal, including flight/flight/freeze responses, manifested by muscle tightness or tension; emotions including anxiety and panic, sadness and depression, and anger.  2. Relaxation techniques can include any or all of the following: Diaphragmatic Breathing (DB), Progressive Muscle Relaxation (PMR), Imaginal Relaxation (IMR), or mindfulness.  3. Relaxation techniques will be learned and practiced within sessions, and assigned for homework between sessions. This will entail interventions such using monitoring forms to track their stress and other symptoms, and assess the efficacy of their implementation of relaxation techniques.   Exposure Therapy Interventions   1. Pt can benefit from learning/continuing exposure therapy interventions, involving confronting situational, emotional, physiological, and cognitive avoided situations.   2. Pt can benefit from reducing and eliminating safety behaviors/objects, such as maladaptive response/ritualization prevention, removing "just in case" items that maintain anxiety, panic, obsessive, and post-traumatic symptoms.   3. Exposure therapy involves any or all of these modalities: in vivo, imaginal, interoceptive, Virtual Reality, written/narrative, and analog in vivo exposure   4. Exposures will be conducted within psychotherapy sessions and assigned for homework between sessions

## 2024-03-04 ENCOUNTER — OUTPATIENT (OUTPATIENT)
Dept: OUTPATIENT SERVICES | Facility: HOSPITAL | Age: 54
LOS: 1 days | End: 2024-03-04

## 2024-03-04 VITALS
RESPIRATION RATE: 16 BRPM | HEART RATE: 244 BPM | HEIGHT: 74 IN | DIASTOLIC BLOOD PRESSURE: 87 MMHG | WEIGHT: 244.05 LBS | TEMPERATURE: 98 F | SYSTOLIC BLOOD PRESSURE: 135 MMHG | OXYGEN SATURATION: 98 %

## 2024-03-04 DIAGNOSIS — G47.33 OBSTRUCTIVE SLEEP APNEA (ADULT) (PEDIATRIC): ICD-10-CM

## 2024-03-04 DIAGNOSIS — G47.33 OBSTRUCTIVE SLEEP APNEA (ADULT) (PEDIATRIC): Chronic | ICD-10-CM

## 2024-03-04 DIAGNOSIS — E11.9 TYPE 2 DIABETES MELLITUS WITHOUT COMPLICATIONS: ICD-10-CM

## 2024-03-04 DIAGNOSIS — R07.89 OTHER CHEST PAIN: ICD-10-CM

## 2024-03-04 DIAGNOSIS — C61 MALIGNANT NEOPLASM OF PROSTATE: ICD-10-CM

## 2024-03-04 DIAGNOSIS — Z98.890 OTHER SPECIFIED POSTPROCEDURAL STATES: Chronic | ICD-10-CM

## 2024-03-04 DIAGNOSIS — Z21 ASYMPTOMATIC HUMAN IMMUNODEFICIENCY VIRUS [HIV] INFECTION STATUS: ICD-10-CM

## 2024-03-04 DIAGNOSIS — M20.41 OTHER HAMMER TOE(S) (ACQUIRED), RIGHT FOOT: Chronic | ICD-10-CM

## 2024-03-04 LAB
A1C WITH ESTIMATED AVERAGE GLUCOSE RESULT: 5.3 % — SIGNIFICANT CHANGE UP (ref 4–5.6)
ALBUMIN SERPL ELPH-MCNC: 4.5 G/DL — SIGNIFICANT CHANGE UP (ref 3.3–5)
ALP SERPL-CCNC: 60 U/L — SIGNIFICANT CHANGE UP (ref 40–120)
ALT FLD-CCNC: 31 U/L — SIGNIFICANT CHANGE UP (ref 4–41)
ANION GAP SERPL CALC-SCNC: 11 MMOL/L — SIGNIFICANT CHANGE UP (ref 7–14)
AST SERPL-CCNC: 32 U/L — SIGNIFICANT CHANGE UP (ref 4–40)
BILIRUB SERPL-MCNC: 0.5 MG/DL — SIGNIFICANT CHANGE UP (ref 0.2–1.2)
BLD GP AB SCN SERPL QL: NEGATIVE — SIGNIFICANT CHANGE UP
BUN SERPL-MCNC: 9 MG/DL — SIGNIFICANT CHANGE UP (ref 7–23)
CALCIUM SERPL-MCNC: 9.4 MG/DL — SIGNIFICANT CHANGE UP (ref 8.4–10.5)
CHLORIDE SERPL-SCNC: 107 MMOL/L — SIGNIFICANT CHANGE UP (ref 98–107)
CO2 SERPL-SCNC: 26 MMOL/L — SIGNIFICANT CHANGE UP (ref 22–31)
CREAT SERPL-MCNC: 1.27 MG/DL — SIGNIFICANT CHANGE UP (ref 0.5–1.3)
EGFR: 68 ML/MIN/1.73M2 — SIGNIFICANT CHANGE UP
ESTIMATED AVERAGE GLUCOSE: 105 — SIGNIFICANT CHANGE UP
GLUCOSE SERPL-MCNC: 85 MG/DL — SIGNIFICANT CHANGE UP (ref 70–99)
HCT VFR BLD CALC: 36.7 % — LOW (ref 39–50)
HGB BLD-MCNC: 12.4 G/DL — LOW (ref 13–17)
MCHC RBC-ENTMCNC: 29 PG — SIGNIFICANT CHANGE UP (ref 27–34)
MCHC RBC-ENTMCNC: 33.8 GM/DL — SIGNIFICANT CHANGE UP (ref 32–36)
MCV RBC AUTO: 85.9 FL — SIGNIFICANT CHANGE UP (ref 80–100)
NRBC # BLD: 0 /100 WBCS — SIGNIFICANT CHANGE UP (ref 0–0)
NRBC # FLD: 0 K/UL — SIGNIFICANT CHANGE UP (ref 0–0)
PLATELET # BLD AUTO: 277 K/UL — SIGNIFICANT CHANGE UP (ref 150–400)
POTASSIUM SERPL-MCNC: 3.5 MMOL/L — SIGNIFICANT CHANGE UP (ref 3.5–5.3)
POTASSIUM SERPL-SCNC: 3.5 MMOL/L — SIGNIFICANT CHANGE UP (ref 3.5–5.3)
PROT SERPL-MCNC: 7.8 G/DL — SIGNIFICANT CHANGE UP (ref 6–8.3)
RBC # BLD: 4.27 M/UL — SIGNIFICANT CHANGE UP (ref 4.2–5.8)
RBC # FLD: 13.4 % — SIGNIFICANT CHANGE UP (ref 10.3–14.5)
RH IG SCN BLD-IMP: POSITIVE — SIGNIFICANT CHANGE UP
RH IG SCN BLD-IMP: POSITIVE — SIGNIFICANT CHANGE UP
SODIUM SERPL-SCNC: 144 MMOL/L — SIGNIFICANT CHANGE UP (ref 135–145)
WBC # BLD: 3.75 K/UL — LOW (ref 3.8–10.5)
WBC # FLD AUTO: 3.75 K/UL — LOW (ref 3.8–10.5)

## 2024-03-04 RX ORDER — ELVITEGRAVIR, COBICISTAT, EMTRICITABINE, AND TENOFOVIR ALAFENAMIDE 150; 150; 200; 10 MG/1; MG/1; MG/1; MG/1
1 TABLET ORAL
Qty: 0 | Refills: 0 | DISCHARGE

## 2024-03-04 RX ORDER — EZETIMIBE 10 MG/1
1 TABLET ORAL
Refills: 0 | DISCHARGE

## 2024-03-04 RX ORDER — AMLODIPINE BESYLATE 2.5 MG/1
1 TABLET ORAL
Qty: 0 | Refills: 0 | DISCHARGE

## 2024-03-04 RX ORDER — LISINOPRIL/HYDROCHLOROTHIAZIDE 10-12.5 MG
1 TABLET ORAL
Qty: 0 | Refills: 0 | DISCHARGE

## 2024-03-04 RX ORDER — SEMAGLUTIDE 0.68 MG/ML
0.5 INJECTION, SOLUTION SUBCUTANEOUS
Refills: 0 | DISCHARGE

## 2024-03-04 RX ORDER — ROSUVASTATIN CALCIUM 5 MG/1
1 TABLET ORAL
Refills: 0 | DISCHARGE

## 2024-03-04 NOTE — H&P PST ADULT - LAST CARDIAC ANGIOGRAM/IMAGING
[facility]- 2020 as per cardiac note-  Occluded small OM2. Severe mid D1 stenosis. medical management copy of note included in chart

## 2024-03-04 NOTE — H&P PST ADULT - NSICDXPASTMEDICALHX_GEN_ALL_CORE_FT
PAST MEDICAL HISTORY:  High cholesterol     HIV disease     HTN (hypertension)     CLARI treated with BiPAP     Other hammer toe(s) (acquired), right foot      PAST MEDICAL HISTORY:  Acid reflux     CAD (coronary artery disease)     Cardiac arrhythmia     High cholesterol     HIV disease     HTN (hypertension)     CLARI treated with BiPAP     Other hammer toe(s) (acquired), right foot     PCP (pneumocystis carinii pneumonia)     Pneumonia     Prediabetes

## 2024-03-04 NOTE — H&P PST ADULT - PROBLEM SELECTOR PLAN 2
Patient notified of recent CT results and has verbalized understanding. No further questions or concerns. She is agreeable to stress test.  Order placed.     pt instructed to hold Mounjaro from 2/26/24, Farxiga last dose 3/7/24

## 2024-03-04 NOTE — H&P PST ADULT - NSICDXFAMILYHX_GEN_ALL_CORE_FT
FAMILY HISTORY:  Father  Still living? Unknown  FH: hypertension, Age at diagnosis: Age Unknown    Mother  Still living? Yes, Estimated age: Age Unknown  FH: hypertension, Age at diagnosis: Age Unknown  FH: type 2 diabetes, Age at diagnosis: Age Unknown    Grandparent  Still living? Yes, Estimated age: Age Unknown  FH: type 2 diabetes, Age at diagnosis: Age Unknown

## 2024-03-04 NOTE — H&P PST ADULT - NEGATIVE OPHTHALMOLOGIC SYMPTOMS
no diplopia/no photophobia/no blurred vision R/no discharge L/no discharge R/no pain L/no pain R/no irritation L/no irritation R/no loss of vision L/no loss of vision R/no scleral injection L/no scleral injection R

## 2024-03-04 NOTE — H&P PST ADULT - NEGATIVE PSYCHIATRIC SYMPTOMS
no suicidal ideation/no depression/no memory loss/no paranoia/no mood swings/no agitation/no visual hallucinations/no auditory hallucinations/no hyperactivity

## 2024-03-04 NOTE — H&P PST ADULT - OTHER CARE PROVIDERS
Dr. Parrish pulmonologist [phone], Dr. Espinoza ENT [phone], Dr. Brizuela cardiologist [phone]

## 2024-03-04 NOTE — H&P PST ADULT - HISTORY OF PRESENT ILLNESS
He is 53 years old and has recently been diagnosed with [place] 4+3 prostate adenocarcinoma. Biopsy was performed in December 2023. 6 of 13 samples showed malignant features with 1 of those samples showing Rosa 4+3 disease. MRI did not show any evidence of extracapsular extension or seminal vesicle involvement. The prostate measured 38 cm. There was a 1.1 cm indeterminate pelvic lymph node noted.  ?   pt has hx of BPH and reports increase frequency. No prior history of urinary tract infections. He has generally been waking up twice overnight to urinate  He is 53 years old and has recently been diagnosed with [place] 4+3 prostate adenocarcinoma. Biopsy was performed in December 2023. 6 of 13 samples showed malignant features with 1 of those samples showing Rosa 4+3 disease. MRI did not show any evidence of extracapsular extension or seminal vesicle involvement. The prostate measured 38 cm. There was a 1.1 cm indeterminate pelvic lymph node noted.  ?   pt has hx of BPH and reports increase frequency. No prior history of urinary tract infections. He has generally been waking up twice overnight to urinate.   Pt now present in PST for preop evaluation for scheduled Robotic Radical prostatectomy pelvic lymph node dissection.

## 2024-03-04 NOTE — H&P PST ADULT - PROBLEM SELECTOR PLAN 1
Scheduled procedure Robotic Radical prostatectomy pelvic lymph node dissection  Pre op and Hibiclens instructions given and explained.  Avoid NSAIDs and OTC supplements.   Patient verbalized understanding  CMP, BMP, CBC, T&S, Hg AIC, pending requested    Pt will take Ezetimibe, lisinopril, amlodipine, rosuvastatin am of surgery

## 2024-03-05 ENCOUNTER — APPOINTMENT (OUTPATIENT)
Dept: CARDIOLOGY | Facility: CLINIC | Age: 54
End: 2024-03-05
Payer: MEDICAID

## 2024-03-05 ENCOUNTER — NON-APPOINTMENT (OUTPATIENT)
Age: 54
End: 2024-03-05

## 2024-03-05 VITALS
SYSTOLIC BLOOD PRESSURE: 130 MMHG | BODY MASS INDEX: 30.62 KG/M2 | OXYGEN SATURATION: 97 % | DIASTOLIC BLOOD PRESSURE: 84 MMHG | WEIGHT: 245 LBS | HEART RATE: 74 BPM

## 2024-03-05 PROBLEM — R73.03 PREDIABETES: Chronic | Status: ACTIVE | Noted: 2024-03-04

## 2024-03-05 PROBLEM — J18.9 PNEUMONIA, UNSPECIFIED ORGANISM: Chronic | Status: ACTIVE | Noted: 2024-03-04

## 2024-03-05 PROBLEM — I49.9 CARDIAC ARRHYTHMIA, UNSPECIFIED: Chronic | Status: ACTIVE | Noted: 2024-03-04

## 2024-03-05 PROBLEM — B59 PNEUMOCYSTOSIS: Chronic | Status: ACTIVE | Noted: 2024-03-04

## 2024-03-05 PROBLEM — K21.9 GASTRO-ESOPHAGEAL REFLUX DISEASE WITHOUT ESOPHAGITIS: Chronic | Status: ACTIVE | Noted: 2024-03-04

## 2024-03-05 PROBLEM — I25.10 ATHEROSCLEROTIC HEART DISEASE OF NATIVE CORONARY ARTERY WITHOUT ANGINA PECTORIS: Chronic | Status: ACTIVE | Noted: 2024-03-04

## 2024-03-05 LAB
CULTURE RESULTS: SIGNIFICANT CHANGE UP
SPECIMEN SOURCE: SIGNIFICANT CHANGE UP

## 2024-03-05 PROCEDURE — 99215 OFFICE O/P EST HI 40 MIN: CPT | Mod: 25

## 2024-03-05 PROCEDURE — G2211 COMPLEX E/M VISIT ADD ON: CPT | Mod: NC,1L

## 2024-03-05 PROCEDURE — 93000 ELECTROCARDIOGRAM COMPLETE: CPT

## 2024-03-06 ENCOUNTER — APPOINTMENT (OUTPATIENT)
Dept: CARDIOLOGY | Facility: CLINIC | Age: 54
End: 2024-03-06
Payer: MEDICAID

## 2024-03-06 ENCOUNTER — OUTPATIENT (OUTPATIENT)
Dept: OUTPATIENT SERVICES | Facility: HOSPITAL | Age: 54
LOS: 1 days | End: 2024-03-06
Payer: MEDICAID

## 2024-03-06 ENCOUNTER — TRANSCRIPTION ENCOUNTER (OUTPATIENT)
Age: 54
End: 2024-03-06

## 2024-03-06 VITALS
RESPIRATION RATE: 16 BRPM | SYSTOLIC BLOOD PRESSURE: 146 MMHG | HEART RATE: 72 BPM | WEIGHT: 244.93 LBS | TEMPERATURE: 98 F | HEIGHT: 75 IN | OXYGEN SATURATION: 99 % | DIASTOLIC BLOOD PRESSURE: 85 MMHG

## 2024-03-06 VITALS
HEART RATE: 67 BPM | OXYGEN SATURATION: 99 % | RESPIRATION RATE: 16 BRPM | DIASTOLIC BLOOD PRESSURE: 67 MMHG | SYSTOLIC BLOOD PRESSURE: 125 MMHG

## 2024-03-06 DIAGNOSIS — E66.9 OBESITY, UNSPECIFIED: ICD-10-CM

## 2024-03-06 DIAGNOSIS — I20.0 UNSTABLE ANGINA: ICD-10-CM

## 2024-03-06 DIAGNOSIS — G47.33 OBSTRUCTIVE SLEEP APNEA (ADULT) (PEDIATRIC): Chronic | ICD-10-CM

## 2024-03-06 DIAGNOSIS — M20.41 OTHER HAMMER TOE(S) (ACQUIRED), RIGHT FOOT: Chronic | ICD-10-CM

## 2024-03-06 DIAGNOSIS — Z98.890 OTHER SPECIFIED POSTPROCEDURAL STATES: Chronic | ICD-10-CM

## 2024-03-06 LAB
ANION GAP SERPL CALC-SCNC: 9 MMOL/L — SIGNIFICANT CHANGE UP (ref 5–17)
BUN SERPL-MCNC: 8 MG/DL — SIGNIFICANT CHANGE UP (ref 7–23)
CALCIUM SERPL-MCNC: 9.9 MG/DL — SIGNIFICANT CHANGE UP (ref 8.4–10.5)
CHLORIDE SERPL-SCNC: 107 MMOL/L — SIGNIFICANT CHANGE UP (ref 96–108)
CO2 SERPL-SCNC: 27 MMOL/L — SIGNIFICANT CHANGE UP (ref 22–31)
CREAT SERPL-MCNC: 1.26 MG/DL — SIGNIFICANT CHANGE UP (ref 0.5–1.3)
EGFR: 68 ML/MIN/1.73M2 — SIGNIFICANT CHANGE UP
GLUCOSE SERPL-MCNC: 98 MG/DL — SIGNIFICANT CHANGE UP (ref 70–99)
HCT VFR BLD CALC: 37.6 % — LOW (ref 39–50)
HGB BLD-MCNC: 12.5 G/DL — LOW (ref 13–17)
MCHC RBC-ENTMCNC: 28.9 PG — SIGNIFICANT CHANGE UP (ref 27–34)
MCHC RBC-ENTMCNC: 33.2 GM/DL — SIGNIFICANT CHANGE UP (ref 32–36)
MCV RBC AUTO: 86.8 FL — SIGNIFICANT CHANGE UP (ref 80–100)
NRBC # BLD: 0 /100 WBCS — SIGNIFICANT CHANGE UP (ref 0–0)
PLATELET # BLD AUTO: 268 K/UL — SIGNIFICANT CHANGE UP (ref 150–400)
POTASSIUM SERPL-MCNC: 3.5 MMOL/L — SIGNIFICANT CHANGE UP (ref 3.5–5.3)
POTASSIUM SERPL-SCNC: 3.5 MMOL/L — SIGNIFICANT CHANGE UP (ref 3.5–5.3)
RBC # BLD: 4.33 M/UL — SIGNIFICANT CHANGE UP (ref 4.2–5.8)
RBC # FLD: 13.4 % — SIGNIFICANT CHANGE UP (ref 10.3–14.5)
SODIUM SERPL-SCNC: 143 MMOL/L — SIGNIFICANT CHANGE UP (ref 135–145)
WBC # BLD: 4.67 K/UL — SIGNIFICANT CHANGE UP (ref 3.8–10.5)
WBC # FLD AUTO: 4.67 K/UL — SIGNIFICANT CHANGE UP (ref 3.8–10.5)

## 2024-03-06 PROCEDURE — 93458 L HRT ARTERY/VENTRICLE ANGIO: CPT

## 2024-03-06 PROCEDURE — C1769: CPT

## 2024-03-06 PROCEDURE — 80048 BASIC METABOLIC PNL TOTAL CA: CPT

## 2024-03-06 PROCEDURE — 93005 ELECTROCARDIOGRAM TRACING: CPT

## 2024-03-06 PROCEDURE — 93010 ELECTROCARDIOGRAM REPORT: CPT

## 2024-03-06 PROCEDURE — 85027 COMPLETE CBC AUTOMATED: CPT

## 2024-03-06 PROCEDURE — 93458 L HRT ARTERY/VENTRICLE ANGIO: CPT | Mod: 26

## 2024-03-06 PROCEDURE — 99152 MOD SED SAME PHYS/QHP 5/>YRS: CPT

## 2024-03-06 PROCEDURE — G2211 COMPLEX E/M VISIT ADD ON: CPT | Mod: NC,1L,95

## 2024-03-06 PROCEDURE — 99213 OFFICE O/P EST LOW 20 MIN: CPT | Mod: 95

## 2024-03-06 PROCEDURE — C1894: CPT

## 2024-03-06 PROCEDURE — C1887: CPT

## 2024-03-06 PROCEDURE — 36415 COLL VENOUS BLD VENIPUNCTURE: CPT

## 2024-03-06 RX ORDER — ACETAMINOPHEN 500 MG
650 TABLET ORAL ONCE
Refills: 0 | Status: COMPLETED | OUTPATIENT
Start: 2024-03-06 | End: 2024-03-06

## 2024-03-06 RX ADMIN — Medication 650 MILLIGRAM(S): at 15:40

## 2024-03-06 RX ADMIN — Medication 650 MILLIGRAM(S): at 16:51

## 2024-03-06 NOTE — ASU DISCHARGE PLAN (ADULT/PEDIATRIC) - ASU DC SPECIAL INSTRUCTIONSFT
Wound Care:   the day AFTER your procedure remove bandage GENTLY, and clean using  mild soap and gentle warm, water stream, pat dry. leave OPEN to air. YOU MAY SHOWER   DO NOT apply lotions, creams, ointments, powder, perfumes to your incision site  DO NOT SOAK your site for 1 week ( no baths, no pools, no tubs, etc...)  Check  your groin and/or wrist daily. A small amount of bruising, and soreness are normal    ACTIVITY: for 24 hours   - DO NOT DRIVE  - DO NOT make any important decisions or sign legal documents   - DO NOT operate heavy machineries   - you may resume sexual activity in 48 hours, unless otherwise instructed by your cardiologist     If your procedure was done through the WRIST: for the NEXT 3 DAYS  - avoid pushing, pulling, with that affected wrist   - avoid repeated movement of that hand and wrist ( eg: typing, hammering)  - DO NOT LIFT anything more than 5 lbs     If your procedure was done through the GROIN: for the NEXT 5 DAYS  - Limit climbing stairs, DO NOT soak in bathtub or pool  - no strenuous activities, pushing, pulling, straining  - Do not lift anything 10lbs or heavier     MEDICATION:   take your medications as explained ( see discharge paperwork)   If you received a STENT, you will be taking antiplatelet medications to KEEP YOUR STENT OPEN ( eg: Aspirin, Plavix, Brilinta, Effient, etc).  Take as prescribed DO NOT STOP taking them without consulting with your cardiologist first.     Follow heart healthy diet recommended by your doctor, , if you smoke STOP SMOKING ( may call [phone] for center of tobacco control if you need assistance)     CALL your doctor to make appointment in 2 WEEKS     ***CALL YOUR DOCTOR***  if you experience: fever, chills, body aches, or severe pain, swelling, redness, heat or yellow discharge at incision site  If you experience Bleeding or excruciating pain at the procedural site, swelling (golf ball size) at your procedural site  If you experience CHEST PAIN  If you experience extremity numbness, tingling, temperature change (of your procedural site)   If you are unable to reach your doctor, you may contact:   -Cardiology Office at [facility] at [phone] or   - [facility] [phone]  - [facility] [phone]

## 2024-03-06 NOTE — H&P CARDIOLOGY - NSICDXPASTMEDICALHX_GEN_ALL_CORE_FT
PAST MEDICAL HISTORY:  Acid reflux     CAD (coronary artery disease)     Cardiac arrhythmia     High cholesterol     HIV disease     HTN (hypertension)     CLARI treated with BiPAP     Other hammer toe(s) (acquired), right foot     PCP (pneumocystis carinii pneumonia)     Pneumonia     Prediabetes

## 2024-03-06 NOTE — ASU DISCHARGE PLAN (ADULT/PEDIATRIC) - CARE PROVIDER_API CALL
Jordy Brizuela  Cardiovascular Disease  [address]  [place] [address]  Phone: [phone]  Fax: [phone]  Established Patient  Follow Up Time: 1 month

## 2024-03-06 NOTE — H&P CARDIOLOGY - HISTORY OF PRESENT ILLNESS
Cardiologist: Dr. Jordy Brizuela  Pharmacy: [facility] ([address] [place] [address])    54 y/o Male w/ PMHx of HTN, HLD, CAD (occluded small OM2, severe mid D1 stenosis - medical management), arrhythmia, CKD (baseline Cr 1.4), HIV, CLARI (compliant with BiPAP), Rosa 4+3 Prostate adenocarcinoma presented to outpatient cardiologist c/o diffuse chest pain, described as dull and non-radiating. Patient reports pain is better w/ rest; no associated symptoms. Patient scheduled for prostatectomy next week. In light of pt's risk factors, CCS class II anginal symptoms; pt referred to [facility] for recommended cardiac catheterization w/ possible intervention if clinically indicated. Denies headaches, changes in vision, dizziness, palpitations, SOB/MCKEON, abdominal pain, N/V/D, leg pain/edema, hematuria, BRBPR, melena or any other complaints. No implantable devices.

## 2024-03-06 NOTE — ASU PATIENT PROFILE, ADULT - FALL HARM RISK - UNIVERSAL INTERVENTIONS
Bahman Marin)  Orthopaedic Surgery  [address] [place] [address]  Phone: [phone]  Fax: [phone]  Follow Up Time: Bed in lowest position, wheels locked, appropriate side rails in place/Call bell, personal items and telephone in reach/Instruct patient to call for assistance before getting out of bed or chair/Non-slip footwear when patient is out of bed/[place] to call system/Physically safe environment - no spills, clutter or unnecessary equipment/Purposeful Proactive Rounding/Room/bathroom lighting operational, light cord in reach

## 2024-03-06 NOTE — ASU PATIENT PROFILE, ADULT - TEACHING/LEARNING FACTORS IMPACT ABILITY TO LEARN
Bridle Placement:   Reason for bridle placement: Secure FT   Medicine delivered during procedure: lubricating jelly   Procedure: Successful   Location of top of clip on FT: @ 105 cm marker   Condition of nose/skin at time of bridle placement: Unremarkable   Face to Face time with patient: 5 minutes.    Janice Christianson ,RD, LD, CNSC (pgr [id number])           none

## 2024-03-06 NOTE — ASU DISCHARGE PLAN (ADULT/PEDIATRIC) - NS MD DC FALL RISK RISK
For information on Fall & Injury Prevention, visit: [email or web address] OR  [email or web address] OR  https://www.cdc.gov/steadi/patient.html

## 2024-03-07 ENCOUNTER — APPOINTMENT (OUTPATIENT)
Dept: PSYCHIATRY | Facility: CLINIC | Age: 54
End: 2024-03-07
Payer: MEDICAID

## 2024-03-07 PROCEDURE — 90837 PSYTX W PT 60 MINUTES: CPT | Mod: 95

## 2024-03-08 NOTE — PLAN
[FreeTextEntry2] : Pt seeks to decrease anxiety symptoms.  [FreeTextEntry1] : Psychoeducation about Diagnosis & Treatment Model  1. Pt will learn about their symptoms and diagnosis and be able to explain it back to psychologist.  2. Pt will learn about the CBT model and be able to accurately categorize their symptoms and diagnosis in terms of cognition, emotional responses, behaviors, and physiological arousal.   Cognitive Therapy Interventions   1. Pt can benefit from learning and practicing cognitive restructuring, including learning to categorize automatic thoughts/cognitive distortions, judgments, biases, and attributions   2. Pt can benefit from using cognitive reframing to challenge cognitive distortions to increase flexibility in thinking   3. Pt can benefit from using self-coping statements to encourage themselves and provide validation   4. Cognitive restructuring will be learned and practiced within session, and assigned for homework between sessions. This will entail interventions such as mood monitoring and thought record forms, to challenge cognitive distortions in real-time.     Relaxation Techniques  1. Pt can benefit from learning and practicing Relaxation Techniques to reduce physiological arousal, including flight/flight/freeze responses, manifested by muscle tightness or tension; emotions including anxiety and panic, sadness and depression, and anger.  2. Relaxation techniques can include any or all of the following: Diaphragmatic Breathing (DB), Progressive Muscle Relaxation (PMR), Imaginal Relaxation (IMR), or mindfulness.  3. Relaxation techniques will be learned and practiced within sessions, and assigned for homework between sessions. This will entail interventions such using monitoring forms to track their stress and other symptoms, and assess the efficacy of their implementation of relaxation techniques.   Exposure Therapy Interventions   1. Pt can benefit from learning/continuing exposure therapy interventions, involving confronting situational, emotional, physiological, and cognitive avoided situations.   2. Pt can benefit from reducing and eliminating safety behaviors/objects, such as maladaptive response/ritualization prevention, removing "just in case" items that maintain anxiety, panic, obsessive, and post-traumatic symptoms.   3. Exposure therapy involves any or all of these modalities: in vivo, imaginal, interoceptive, Virtual Reality, written/narrative, and analog in vivo exposure   4. Exposures will be conducted within psychotherapy sessions and assigned for homework between sessions   [de-identified] : Psychologist met with Pt using teletherapy platform. Pt was at their home while psychologist was working remotely via [facility]. Pt continued to provide consent for telehealth services.   Affect was observed to be full. Mood was reported as unchanged. Pt was responsive, receptive, and engaged throughout session. Evidence of risk was neither observed nor reported.   Goals of the session were to review hw of self-care activities completed, create a coping statement/response to boost motivation and engagement in daily activities following Pt's upcoming medical procedures, and revisit activity planning (to maintain motivation and manage symptoms of anxiety). An activities list PDF was shared in session. Pt will review the activities list and practice using the coping response script when needed for hw.

## 2024-03-08 NOTE — ASU PATIENT PROFILE, ADULT - NSICDXPASTMEDICALHX_GEN_ALL_CORE_FT
PAST MEDICAL HISTORY:  Acid reflux     CAD (coronary artery disease)     Cardiac arrhythmia     High cholesterol     HIV disease     HTN (hypertension)     CLRAI treated with BiPAP     Other hammer toe(s) (acquired), right foot     PCP (pneumocystis carinii pneumonia)     Pneumonia     Prediabetes

## 2024-03-08 NOTE — ASU PATIENT PROFILE, ADULT - FALL HARM RISK - UNIVERSAL INTERVENTIONS
Bed in lowest position, wheels locked, appropriate side rails in place/Call bell, personal items and telephone in reach/Instruct patient to call for assistance before getting out of bed or chair/Non-slip footwear when patient is out of bed/[place] to call system/Physically safe environment - no spills, clutter or unnecessary equipment/Purposeful Proactive Rounding/Room/bathroom lighting operational, light cord in reach

## 2024-03-10 ENCOUNTER — TRANSCRIPTION ENCOUNTER (OUTPATIENT)
Age: 54
End: 2024-03-10

## 2024-03-11 ENCOUNTER — RESULT REVIEW (OUTPATIENT)
Age: 54
End: 2024-03-11

## 2024-03-11 ENCOUNTER — INPATIENT (INPATIENT)
Facility: HOSPITAL | Age: 54
LOS: 0 days | Discharge: HOME | End: 2024-03-12
Attending: UROLOGY | Admitting: UROLOGY
Payer: MEDICAID

## 2024-03-11 ENCOUNTER — APPOINTMENT (OUTPATIENT)
Dept: UROLOGY | Facility: HOSPITAL | Age: 54
End: 2024-03-11

## 2024-03-11 VITALS
HEART RATE: 73 BPM | OXYGEN SATURATION: 98 % | HEIGHT: 75 IN | TEMPERATURE: 98 F | RESPIRATION RATE: 16 BRPM | DIASTOLIC BLOOD PRESSURE: 90 MMHG | WEIGHT: 244.93 LBS | SYSTOLIC BLOOD PRESSURE: 147 MMHG

## 2024-03-11 DIAGNOSIS — M20.41 OTHER HAMMER TOE(S) (ACQUIRED), RIGHT FOOT: Chronic | ICD-10-CM

## 2024-03-11 DIAGNOSIS — G47.33 OBSTRUCTIVE SLEEP APNEA (ADULT) (PEDIATRIC): Chronic | ICD-10-CM

## 2024-03-11 DIAGNOSIS — Z98.890 OTHER SPECIFIED POSTPROCEDURAL STATES: Chronic | ICD-10-CM

## 2024-03-11 DIAGNOSIS — C61 MALIGNANT NEOPLASM OF PROSTATE: ICD-10-CM

## 2024-03-11 LAB
GLUCOSE BLDC GLUCOMTR-MCNC: 105 MG/DL — HIGH (ref 70–99)
GLUCOSE BLDC GLUCOMTR-MCNC: 114 MG/DL — HIGH (ref 70–99)
GLUCOSE BLDC GLUCOMTR-MCNC: 132 MG/DL — HIGH (ref 70–99)
GLUCOSE BLDC GLUCOMTR-MCNC: 134 MG/DL — HIGH (ref 70–99)

## 2024-03-11 PROCEDURE — 88309 TISSUE EXAM BY PATHOLOGIST: CPT | Mod: 26

## 2024-03-11 PROCEDURE — 88307 TISSUE EXAM BY PATHOLOGIST: CPT | Mod: 26

## 2024-03-11 DEVICE — LIGATING CLIPS WECK HEMOLOK POLYMER LARGE (PURPLE) 6: Type: IMPLANTABLE DEVICE | Status: FUNCTIONAL

## 2024-03-11 RX ORDER — ACETAMINOPHEN 500 MG
975 TABLET ORAL EVERY 6 HOURS
Refills: 0 | Status: DISCONTINUED | OUTPATIENT
Start: 2024-03-11 | End: 2024-03-12

## 2024-03-11 RX ORDER — KETOROLAC TROMETHAMINE 30 MG/ML
15 SYRINGE (ML) INJECTION EVERY 8 HOURS
Refills: 0 | Status: DISCONTINUED | OUTPATIENT
Start: 2024-03-11 | End: 2024-03-12

## 2024-03-11 RX ORDER — DEXTROSE 50 % IN WATER 50 %
15 SYRINGE (ML) INTRAVENOUS ONCE
Refills: 0 | Status: DISCONTINUED | OUTPATIENT
Start: 2024-03-11 | End: 2024-03-12

## 2024-03-11 RX ORDER — SODIUM CHLORIDE 9 MG/ML
1000 INJECTION, SOLUTION INTRAVENOUS
Refills: 0 | Status: DISCONTINUED | OUTPATIENT
Start: 2024-03-11 | End: 2024-03-12

## 2024-03-11 RX ORDER — METOCLOPRAMIDE HCL 10 MG
10 TABLET ORAL EVERY 6 HOURS
Refills: 0 | Status: DISCONTINUED | OUTPATIENT
Start: 2024-03-11 | End: 2024-03-12

## 2024-03-11 RX ORDER — HYDROCORTISONE 1 %
1 OINTMENT (GRAM) TOPICAL THREE TIMES A DAY
Refills: 0 | Status: DISCONTINUED | OUTPATIENT
Start: 2024-03-11 | End: 2024-03-12

## 2024-03-11 RX ORDER — EZETIMIBE 10 MG/1
1 TABLET ORAL
Refills: 0 | DISCHARGE

## 2024-03-11 RX ORDER — GLUCAGON INJECTION, SOLUTION 0.5 MG/.1ML
1 INJECTION, SOLUTION SUBCUTANEOUS ONCE
Refills: 0 | Status: DISCONTINUED | OUTPATIENT
Start: 2024-03-11 | End: 2024-03-12

## 2024-03-11 RX ORDER — FAMOTIDINE 10 MG/ML
40 INJECTION INTRAVENOUS
Refills: 0 | Status: DISCONTINUED | OUTPATIENT
Start: 2024-03-11 | End: 2024-03-11

## 2024-03-11 RX ORDER — DEXTROSE 50 % IN WATER 50 %
25 SYRINGE (ML) INTRAVENOUS ONCE
Refills: 0 | Status: DISCONTINUED | OUTPATIENT
Start: 2024-03-11 | End: 2024-03-12

## 2024-03-11 RX ORDER — DEXTROSE 50 % IN WATER 50 %
12.5 SYRINGE (ML) INTRAVENOUS ONCE
Refills: 0 | Status: DISCONTINUED | OUTPATIENT
Start: 2024-03-11 | End: 2024-03-12

## 2024-03-11 RX ORDER — INSULIN LISPRO 100/ML
VIAL (ML) SUBCUTANEOUS
Refills: 0 | Status: DISCONTINUED | OUTPATIENT
Start: 2024-03-11 | End: 2024-03-12

## 2024-03-11 RX ORDER — HEPARIN SODIUM 5000 [USP'U]/ML
5000 INJECTION INTRAVENOUS; SUBCUTANEOUS EVERY 8 HOURS
Refills: 0 | Status: DISCONTINUED | OUTPATIENT
Start: 2024-03-11 | End: 2024-03-12

## 2024-03-11 RX ORDER — ROSUVASTATIN CALCIUM 5 MG/1
1 TABLET ORAL
Refills: 0 | DISCHARGE

## 2024-03-11 RX ORDER — TIRZEPATIDE 15 MG/.5ML
2.5 INJECTION, SOLUTION SUBCUTANEOUS
Refills: 0 | DISCHARGE

## 2024-03-11 RX ORDER — LISINOPRIL 2.5 MG/1
20 TABLET ORAL DAILY
Refills: 0 | Status: DISCONTINUED | OUTPATIENT
Start: 2024-03-11 | End: 2024-03-12

## 2024-03-11 RX ORDER — DAPAGLIFLOZIN 10 MG/1
1 TABLET, FILM COATED ORAL
Refills: 0 | DISCHARGE

## 2024-03-11 RX ORDER — FAMOTIDINE 10 MG/ML
20 INJECTION INTRAVENOUS
Refills: 0 | Status: DISCONTINUED | OUTPATIENT
Start: 2024-03-11 | End: 2024-03-12

## 2024-03-11 RX ORDER — LIDOCAINE 4 G/100G
1 CREAM TOPICAL
Refills: 0 | Status: DISCONTINUED | OUTPATIENT
Start: 2024-03-11 | End: 2024-03-12

## 2024-03-11 RX ORDER — ATORVASTATIN CALCIUM 80 MG/1
40 TABLET, FILM COATED ORAL AT BEDTIME
Refills: 0 | Status: DISCONTINUED | OUTPATIENT
Start: 2024-03-11 | End: 2024-03-12

## 2024-03-11 RX ORDER — INSULIN LISPRO 100/ML
VIAL (ML) SUBCUTANEOUS AT BEDTIME
Refills: 0 | Status: DISCONTINUED | OUTPATIENT
Start: 2024-03-11 | End: 2024-03-12

## 2024-03-11 RX ORDER — ELVITEGRAVIR, COBICISTAT, EMTRICITABINE, AND TENOFOVIR ALAFENAMIDE 150; 150; 200; 10 MG/1; MG/1; MG/1; MG/1
1 TABLET ORAL DAILY
Refills: 0 | Status: DISCONTINUED | OUTPATIENT
Start: 2024-03-11 | End: 2024-03-12

## 2024-03-11 RX ORDER — AMLODIPINE BESYLATE 2.5 MG/1
5 TABLET ORAL DAILY
Refills: 0 | Status: DISCONTINUED | OUTPATIENT
Start: 2024-03-11 | End: 2024-03-12

## 2024-03-11 RX ORDER — LISINOPRIL/HYDROCHLOROTHIAZIDE 10-12.5 MG
1 TABLET ORAL
Refills: 0 | DISCHARGE

## 2024-03-11 RX ORDER — SENNA PLUS 8.6 MG/1
2 TABLET ORAL AT BEDTIME
Refills: 0 | Status: DISCONTINUED | OUTPATIENT
Start: 2024-03-11 | End: 2024-03-12

## 2024-03-11 RX ORDER — AMLODIPINE BESYLATE 2.5 MG/1
1 TABLET ORAL
Refills: 0 | DISCHARGE

## 2024-03-11 RX ADMIN — SENNA PLUS 2 TABLET(S): 8.6 TABLET ORAL at 21:58

## 2024-03-11 RX ADMIN — HEPARIN SODIUM 5000 UNIT(S): 5000 INJECTION INTRAVENOUS; SUBCUTANEOUS at 21:58

## 2024-03-11 RX ADMIN — LIDOCAINE 1 APPLICATION(S): 4 CREAM TOPICAL at 21:54

## 2024-03-11 RX ADMIN — ATORVASTATIN CALCIUM 40 MILLIGRAM(S): 80 TABLET, FILM COATED ORAL at 21:59

## 2024-03-11 RX ADMIN — Medication 975 MILLIGRAM(S): at 22:59

## 2024-03-11 RX ADMIN — Medication 975 MILLIGRAM(S): at 21:59

## 2024-03-11 NOTE — PROGRESS NOTE ADULT - SUBJECTIVE AND OBJECTIVE BOX
Note    Post op Check    s/p : RALP    Pt seen / examined without complaints pain controlled    Vital Signs Last 24 Hrs  T(C): 36.8 (11 Mar 2024 19:26), Max: 36.9 (11 Mar 2024 11:36)  T(F): 98.2 (11 Mar 2024 19:26), Max: 98.5 (11 Mar 2024 11:36)  HR: 81 (11 Mar 2024 19:26) (72 - 84)  BP: 133/85 (11 Mar 2024 19:26) (117/69 - 147/90)  BP(mean): 83 (11 Mar 2024 18:30) (79 - 85)  RR: 18 (11 Mar 2024 19:26) (14 - 22)  SpO2: 98% (11 Mar 2024 19:26) (95% - 99%)    Parameters below as of 11 Mar 2024 19:26  Patient On (Oxygen Delivery Method): room air    I&O's Summary    11 Mar 2024 07:01  -  11 Mar 2024 19:49  --------------------------------------------------------  IN: 350 mL / OUT: 602 mL / NET: -252 mL    Vital signs reviewed.   CONSTITUTIONAL: Well-appearing; well-nourished; in no apparent distress. Non-toxic appearing.   HEAD: Normocephalic, atraumatic.  EYES: Normal conjunctiva and no sclera injection noted  ENT: normal nose; no rhinorrhea.  CARD: Normal S1, S2  RESP: Normal chest excursion with respiration; breath sounds clear and equal bilaterally  ABD/GI: soft, non-distended; non-tender  EXT/MS: moves all extremities; distal pulses are normal, no pedal edema.  SKIN: Normal for age and race; warm; dry; good turgor; no apparent lesions or exudate noted.  NEURO: Awake, alert, oriented x 3  PSYCH: Normal mood; appropriate affect.    Arriola with 525 cc mallorie colored urine  ROBI drain w/ 75 cc w/ sanguinous output                Note    Post op Check    s/p : RALP    Pt seen / examined without complaints pain controlled    Vital Signs Last 24 Hrs  T(C): 36.8 (11 Mar 2024 19:26), Max: 36.9 (11 Mar 2024 11:36)  T(F): 98.2 (11 Mar 2024 19:26), Max: 98.5 (11 Mar 2024 11:36)  HR: 81 (11 Mar 2024 19:26) (72 - 84)  BP: 133/85 (11 Mar 2024 19:26) (117/69 - 147/90)  BP(mean): 83 (11 Mar 2024 18:30) (79 - 85)  RR: 18 (11 Mar 2024 19:26) (14 - 22)  SpO2: 98% (11 Mar 2024 19:26) (95% - 99%)    Parameters below as of 11 Mar 2024 19:26  Patient On (Oxygen Delivery Method): room air    I&O's Summary    11 Mar 2024 07:01  -  11 Mar 2024 19:49  --------------------------------------------------------  IN: 350 mL / OUT: 602 mL / NET: -252 mL    Vital signs reviewed.   CONSTITUTIONAL: Well-appearing; well-nourished; in no apparent distress. Non-toxic appearing.   HEAD: Normocephalic, atraumatic.  EYES: Normal conjunctiva and no sclera injection noted  ENT: normal nose; no rhinorrhea.  CARD: Normal S1, S2  RESP: Normal chest excursion with respiration; breath sounds clear and equal bilaterally  ABD/GI: Surgical sites CDI. ROBI Drain noted on RT flank. Soft, non-distended; non-tender  EXT/MS: moves all extremities; distal pulses are normal, no pedal edema.  SKIN: Normal for age and race; warm; dry; good turgor; no apparent lesions or exudate noted.  NEURO: Awake, alert, oriented x 3  PSYCH: Normal mood; appropriate affect.    Arriola with 525 cc mallorie colored urine  ROBI drain w/ 75 cc w/ sanguinous output

## 2024-03-11 NOTE — ASU PREOP CHECKLIST - SELECT TESTS ORDERED
Type and Cross/Type and Screen FS/CBC/CMP/Type and Cross/Type and Screen/POCT Blood Glucose F-105 @ 12:18 am/CBC/CMP/Type and Cross/Type and Screen/POCT Blood Glucose

## 2024-03-11 NOTE — PATIENT PROFILE ADULT - FALL HARM RISK - HARM RISK INTERVENTIONS
Assistance with ambulation/Assistance OOB with selected safe patient handling equipment/Communicate Risk of Fall with Harm to all staff/Monitor gait and stability/Reinforce activity limits and safety measures with patient and family/Sit up slowly, dangle for a short time, stand at bedside before walking/Tailored Fall Risk Interventions/Visual Cue: Yellow wristband and red socks/Bed in lowest position, wheels locked, appropriate side rails in place/Call bell, personal items and telephone in reach/Instruct patient to call for assistance before getting out of bed or chair/Non-slip footwear when patient is out of bed/[place] to call system/Physically safe environment - no spills, clutter or unnecessary equipment/Purposeful Proactive Rounding/Room/bathroom lighting operational, light cord in reach

## 2024-03-12 ENCOUNTER — TRANSCRIPTION ENCOUNTER (OUTPATIENT)
Age: 54
End: 2024-03-12

## 2024-03-12 VITALS
HEART RATE: 72 BPM | RESPIRATION RATE: 20 BRPM | TEMPERATURE: 98 F | SYSTOLIC BLOOD PRESSURE: 127 MMHG | OXYGEN SATURATION: 100 % | DIASTOLIC BLOOD PRESSURE: 70 MMHG

## 2024-03-12 DIAGNOSIS — C61 MALIGNANT NEOPLASM OF PROSTATE: ICD-10-CM

## 2024-03-12 DIAGNOSIS — B20 HUMAN IMMUNODEFICIENCY VIRUS [HIV] DISEASE: ICD-10-CM

## 2024-03-12 DIAGNOSIS — G47.33 OBSTRUCTIVE SLEEP APNEA (ADULT) (PEDIATRIC): ICD-10-CM

## 2024-03-12 DIAGNOSIS — R73.03 PREDIABETES: ICD-10-CM

## 2024-03-12 DIAGNOSIS — E78.00 PURE HYPERCHOLESTEROLEMIA, UNSPECIFIED: ICD-10-CM

## 2024-03-12 DIAGNOSIS — I10 ESSENTIAL (PRIMARY) HYPERTENSION: ICD-10-CM

## 2024-03-12 DIAGNOSIS — I25.10 ATHEROSCLEROTIC HEART DISEASE OF NATIVE CORONARY ARTERY WITHOUT ANGINA PECTORIS: ICD-10-CM

## 2024-03-12 LAB
ANION GAP SERPL CALC-SCNC: 12 MMOL/L — SIGNIFICANT CHANGE UP (ref 7–14)
BUN SERPL-MCNC: 13 MG/DL — SIGNIFICANT CHANGE UP (ref 7–23)
CALCIUM SERPL-MCNC: 9.1 MG/DL — SIGNIFICANT CHANGE UP (ref 8.4–10.5)
CHLORIDE SERPL-SCNC: 104 MMOL/L — SIGNIFICANT CHANGE UP (ref 98–107)
CO2 SERPL-SCNC: 25 MMOL/L — SIGNIFICANT CHANGE UP (ref 22–31)
CREAT FLD-MCNC: 1.28 MG/DL — SIGNIFICANT CHANGE UP
CREAT SERPL-MCNC: 1.25 MG/DL — SIGNIFICANT CHANGE UP (ref 0.5–1.3)
EGFR: 69 ML/MIN/1.73M2 — SIGNIFICANT CHANGE UP
GLUCOSE BLDC GLUCOMTR-MCNC: 122 MG/DL — HIGH (ref 70–99)
GLUCOSE BLDC GLUCOMTR-MCNC: 123 MG/DL — HIGH (ref 70–99)
GLUCOSE SERPL-MCNC: 130 MG/DL — HIGH (ref 70–99)
HCT VFR BLD CALC: 33.3 % — LOW (ref 39–50)
HGB BLD-MCNC: 11.3 G/DL — LOW (ref 13–17)
MCHC RBC-ENTMCNC: 29.2 PG — SIGNIFICANT CHANGE UP (ref 27–34)
MCHC RBC-ENTMCNC: 33.9 GM/DL — SIGNIFICANT CHANGE UP (ref 32–36)
MCV RBC AUTO: 86 FL — SIGNIFICANT CHANGE UP (ref 80–100)
NRBC # BLD: 0 /100 WBCS — SIGNIFICANT CHANGE UP (ref 0–0)
NRBC # FLD: 0 K/UL — SIGNIFICANT CHANGE UP (ref 0–0)
PLATELET # BLD AUTO: 202 K/UL — SIGNIFICANT CHANGE UP (ref 150–400)
POTASSIUM SERPL-MCNC: 4.1 MMOL/L — SIGNIFICANT CHANGE UP (ref 3.5–5.3)
POTASSIUM SERPL-SCNC: 4.1 MMOL/L — SIGNIFICANT CHANGE UP (ref 3.5–5.3)
RBC # BLD: 3.87 M/UL — LOW (ref 4.2–5.8)
RBC # FLD: 13.9 % — SIGNIFICANT CHANGE UP (ref 10.3–14.5)
SODIUM SERPL-SCNC: 141 MMOL/L — SIGNIFICANT CHANGE UP (ref 135–145)
WBC # BLD: 8.25 K/UL — SIGNIFICANT CHANGE UP (ref 3.8–10.5)
WBC # FLD AUTO: 8.25 K/UL — SIGNIFICANT CHANGE UP (ref 3.8–10.5)

## 2024-03-12 PROCEDURE — 99223 1ST HOSP IP/OBS HIGH 75: CPT

## 2024-03-12 RX ORDER — LIDOCAINE 4 G/100G
1 CREAM TOPICAL
Qty: 0 | Refills: 0 | DISCHARGE
Start: 2024-03-12

## 2024-03-12 RX ORDER — ELVITEGRAVIR, COBICISTAT, EMTRICITABINE, AND TENOFOVIR ALAFENAMIDE 150; 150; 200; 10 MG/1; MG/1; MG/1; MG/1
1 TABLET ORAL
Qty: 0 | Refills: 0 | DISCHARGE
Start: 2024-03-12

## 2024-03-12 RX ORDER — LANOLIN ALCOHOL/MO/W.PET/CERES
3 CREAM (GRAM) TOPICAL ONCE
Refills: 0 | Status: COMPLETED | OUTPATIENT
Start: 2024-03-12 | End: 2024-03-12

## 2024-03-12 RX ORDER — FAMOTIDINE 10 MG/ML
1 INJECTION INTRAVENOUS
Qty: 0 | Refills: 0 | DISCHARGE

## 2024-03-12 RX ORDER — ELVITEGRAVIR, COBICISTAT, EMTRICITABINE, AND TENOFOVIR ALAFENAMIDE 150; 150; 200; 10 MG/1; MG/1; MG/1; MG/1
1 TABLET ORAL
Refills: 0 | DISCHARGE

## 2024-03-12 RX ORDER — ACETAMINOPHEN 500 MG
3 TABLET ORAL
Qty: 0 | Refills: 0 | DISCHARGE
Start: 2024-03-12

## 2024-03-12 RX ORDER — SODIUM CHLORIDE 9 MG/ML
1000 INJECTION, SOLUTION INTRAVENOUS
Refills: 0 | Status: DISCONTINUED | OUTPATIENT
Start: 2024-03-12 | End: 2024-03-12

## 2024-03-12 RX ORDER — HYDROCORTISONE 1 %
1 OINTMENT (GRAM) TOPICAL
Qty: 0 | Refills: 0 | DISCHARGE
Start: 2024-03-12

## 2024-03-12 RX ADMIN — LIDOCAINE 1 APPLICATION(S): 4 CREAM TOPICAL at 11:06

## 2024-03-12 RX ADMIN — ELVITEGRAVIR, COBICISTAT, EMTRICITABINE, AND TENOFOVIR ALAFENAMIDE 1 TABLET(S): 150; 150; 200; 10 TABLET ORAL at 13:52

## 2024-03-12 RX ADMIN — LISINOPRIL 20 MILLIGRAM(S): 2.5 TABLET ORAL at 05:17

## 2024-03-12 RX ADMIN — Medication 975 MILLIGRAM(S): at 06:18

## 2024-03-12 RX ADMIN — Medication 975 MILLIGRAM(S): at 11:06

## 2024-03-12 RX ADMIN — HEPARIN SODIUM 5000 UNIT(S): 5000 INJECTION INTRAVENOUS; SUBCUTANEOUS at 05:17

## 2024-03-12 RX ADMIN — Medication 975 MILLIGRAM(S): at 11:51

## 2024-03-12 RX ADMIN — Medication 3 MILLIGRAM(S): at 02:38

## 2024-03-12 RX ADMIN — AMLODIPINE BESYLATE 5 MILLIGRAM(S): 2.5 TABLET ORAL at 05:17

## 2024-03-12 RX ADMIN — LIDOCAINE 1 APPLICATION(S): 4 CREAM TOPICAL at 05:18

## 2024-03-12 RX ADMIN — Medication 975 MILLIGRAM(S): at 05:18

## 2024-03-12 NOTE — DISCHARGE NOTE NURSING/CASE MANAGEMENT/SOCIAL WORK - PATIENT PORTAL LINK FT
You can access the FollowMyHealth Patient Portal offered by [facility] by registering at the following website: [email or web address]. By joining [facility]’s FollowMyHealth portal, you will also be able to view your health information using other applications (apps) compatible with our system.

## 2024-03-12 NOTE — PROGRESS NOTE ADULT - ASSESSMENT
Pt is s/p RALP. No acute events during PACU course. Continue to optimize for discharge.    Strict I&O's  Analgesia/Antiemetics  DVT prophylaxis  Incentive spirometry  Clears / OOB  AM labs
Pt is s/p RALP. No acute events during PACU course.   POD#1 - no new events; no gas  Plan:  - OOB  - labs/ROBI creat  - clears

## 2024-03-12 NOTE — CONSULT NOTE ADULT - PROBLEM SELECTOR RECOMMENDATION 3
On BIPAP at home, unaware of settings   - Tolerated BIPAP 10/5 last night, can continue if not being discharged home today

## 2024-03-12 NOTE — DISCHARGE NOTE PROVIDER - HOSPITAL COURSE
Pt had RALP yesterday; did well; ambulated; passed gas and amparo reg diet; labs stable; ROBI removed; pain controlled; home with severino.

## 2024-03-12 NOTE — CONSULT NOTE ADULT - ASSESSMENT
53M hx of HIV, CLARI on BIPAP, HLD, HTN, pre-DM, CAD, prostate CA presenting for scheduled prostatectomy.

## 2024-03-12 NOTE — CONSULT NOTE ADULT - PROBLEM SELECTOR RECOMMENDATION 4
ADDENDUM: Patient's daughter called back and confirmed for RN that patient's BP has been elevated (see readings below). RN inquired if patient had been taking hydralazin TID as recommended at OV with ALVARO Marie on 12/19/18. Patient's daughter advised that patient has only been taking the hydralazine BID. RN advised patient's daughter to have patient take the medication TID, monitor for one week and call our office if BP still remains elevated or if patient having any new symptoms of dizziness or light headedness. Patient's daughter verbalized understanding and was in agreement with plan.     Patient's daughter Yasmin called and left  advising that patient's BP's have been elevated the past few days (152/52, 156/54, and 144/60). RN returned patient's daughter's call and left VM advising her to call back to discuss further.       12/19/18 OV note ALVARO Marie  His blood pressure today was mildly elevated but improved on recheck. He has been taking his hydralazine only twice a day. I asked him to take a three times a day which he is agreeable to. He will continue to monitor his blood pressures at home and call us with any consistently elevated readings. We could increase his hydralazine if this were to be the case.     I will have him return to see Dr. Gibson in about three months. We will repeat a basic metabolic panel at that time. Of course, I asked him to contact us sooner with any concerns or worsening symptoms      
cards notes reviewed, non-obstructive CAD  - Should be on ASA, can restart on discharge   - C/w statin as below

## 2024-03-12 NOTE — BRIEF OPERATIVE NOTE - COMMENTS
I, Marcos Gonzalez PA-C, served as the first assistant in this operation. I assisted in placing ports, docking, and targeting the da Wenceslao robot, first assisted at the surgical field while the surgeon was performing the operation at the robotic console by providing instrument exchanges, tissue retraction, suction and irrigation, specimen retrieval, passing and removing sutures and sponges, undocking the robotic platform, and closed surgical wounds.

## 2024-03-12 NOTE — CONSULT NOTE ADULT - PROBLEM SELECTOR RECOMMENDATION 7
Hold home Farxiga and Mounjaro   - A1c = 5.3   - C/w ISS and monitor FS qAC and qHS  - Will adjust regimen as needed based on insulin requirements

## 2024-03-12 NOTE — CONSULT NOTE ADULT - PROBLEM/RECOMMENDATION-1
DISPLAY PLAN FREE TEXT I have reviewed and confirmed nurses' notes for patient's medications, allergies, medical history, and surgical history.

## 2024-03-12 NOTE — DISCHARGE NOTE PROVIDER - NSDCFUADDINST_GEN_ALL_CORE_FT
May alternate tylenol and motrin every 6 hours for pain; do not take motrin more than a few days since it may cause GI upset  Arriola care as instructed

## 2024-03-12 NOTE — DISCHARGE NOTE PROVIDER - NSDCCPCAREPLAN_GEN_ALL_CORE_FT
PRINCIPAL DISCHARGE DIAGNOSIS  Diagnosis: Prostate CA  Assessment and Plan of Treatment: Drink plenty of fluids.  No heavy lifting (greater than 10 pounds) or straining for 4 to 6 weeks.  You may shower, just pat white strips dry, they will fall off in a few weeks. Change dressing at drain site daily or as needed until dry.  Arriola care as instructed  's  office will call  to schedule a follow up appointment.  Call the office if you have fever greater than 101, pain not relieved with pain medication, nausea/vomiting.

## 2024-03-12 NOTE — DISCHARGE NOTE PROVIDER - CARE PROVIDER_API CALL
Alexandre Bruner)  Urology  [address] [place] [address], [address] [address]  Phone: [phone]  Fax: [phone]  Follow Up Time:

## 2024-03-12 NOTE — CONSULT NOTE ADULT - SUBJECTIVE AND OBJECTIVE BOX
2021    Patient Name: Nitin Joyner   :  1943   MRN: [id number]  ICD10:  z94.1 , z79.899    Subject: Request for Labs    Nitin Joyner is a heart transplant patient currently being followed by the [facility].  We would like to request your assistance in obtaining the following laboratory tests which are part of our routine surveillance program for heart transplant recipients.  (Items needed are checked.)    Please fax the lab results as soon as they are available to:   Thoracic Transplant Department  Fax Number:  [phone]     Item Frequency   x CBC with platelets 2021 and every two weeks with med changes    x Basic metabolic panel (including:  BUN,   Serum Creatinine, Sodium, Potassium, Chloride,   CO2, Calcium, Magnesium, Phosphorus, and Glucose) 2021 and every two weeks with med changes   x Tacrolimus/Prograf level - 12-hour trough  (Should be mailed to the [facility] in the  provided to you by the patient.) 2021 and every two weeks with med changes     Thank you  for your continued support and the opportunity to collaborate in the care of this patient.  If you have any questions, please call the Thoracic Transplant Team at [phone] or [phone].    .    CHIEF COMPLAINT: Patient is a 53y old  Male who presents with a chief complaint of prostatectomy (12 Mar 2024 09:31)      HPI: 53M hx of HIV, CLARI on BIPAP, HLD, HTN, pre-DM, prostate CA presenting for scheduled prostatectomy. Patient seen and examined, states that his pain is manageable with pain medications. States he passed flatus this AM. Aware that he will be going home with severino. Denies chest pain or shortness of breath. Used BIPAP last night, states machine was loud and he was not able to sleep well with it.       Allergies: No Known Allergies    HOME MEDICATIONS: [x] Reviewed    MEDICATIONS  (STANDING):  acetaminophen     Tablet .. 975 milliGRAM(s) Oral every 6 hours  amLODIPine   Tablet 5 milliGRAM(s) Oral daily  atorvastatin 40 milliGRAM(s) Oral at bedtime  dextrose 5% + sodium chloride 0.45%. 1000 milliLiter(s) (75 mL/Hr) IV Continuous <Continuous>  dextrose 5%. 1000 milliLiter(s) (100 mL/Hr) IV Continuous <Continuous>  dextrose 5%. 1000 milliLiter(s) (50 mL/Hr) IV Continuous <Continuous>  dextrose 50% Injectable 25 Gram(s) IV Push once  dextrose 50% Injectable 12.5 Gram(s) IV Push once  dextrose 50% Injectable 25 Gram(s) IV Push once  glucagon  Injectable 1 milliGRAM(s) IntraMuscular once  heparin   Injectable 5000 Unit(s) SubCutaneous every 8 hours  hydrochlorothiazide 12.5 milliGRAM(s) Oral daily  hydrocortisone 1% Cream 1 Application(s) Topical three times a day  insulin lispro (ADMELOG) corrective regimen sliding scale   SubCutaneous at bedtime  insulin lispro (ADMELOG) corrective regimen sliding scale   SubCutaneous three times a day before meals  lidocaine 5% Ointment 1 Application(s) Topical every 3 hours  lisinopril 20 milliGRAM(s) Oral daily  senna 2 Tablet(s) Oral at bedtime  tenofovir alafenamide 10 mG/[id number] mG/cobicistat 150 mG/emtricitabine 200 mG (GENVOYA) 1 Tablet(s) Oral daily    MEDICATIONS  (PRN):  dextrose Oral Gel 15 Gram(s) Oral once PRN Blood Glucose LESS THAN 70 milliGRAM(s)/deciliter  famotidine    Tablet 20 milliGRAM(s) Oral two times a day PRN Acid reflux  ketorolac   Injectable 15 milliGRAM(s) IV Push every 8 hours PRN Moderate Pain (4 - 6)  metoclopramide Injectable 10 milliGRAM(s) IV Push every 6 hours PRN Nausea and/or Vomiting      PAST MEDICAL & SURGICAL HISTORY:  HTN (hypertension)  HIV disease  High cholesterol  CLARI treated with BiPAP  Other hammer toe(s) (acquired), right foot  Prediabetes  CAD (coronary artery disease)  Cardiac arrhythmia  PCP (pneumocystis carinii pneumonia)  Acid reflux    S/P colonoscopy      [X] Reviewed     SOCIAL HISTORY:  [x] Substance abuse: denies   [x] Tobacco: former smoker    [x] Alcohol use: denies     FAMILY HISTORY:  FH: hypertension (Father, Mother)    FH: type 2 diabetes (Mother, Grandparent)    [x] No pertinent family history in first degree relatives     REVIEW OF SYSTEMS:    [x] All other ROS negative  [  ] Unable to obtain due to poor mental status    Vital Signs Last 24 Hrs  T(C): 36.6 (12 Mar 2024 09:45), Max: 37.2 (12 Mar 2024 01:40)  T(F): 97.9 (12 Mar 2024 09:45), Max: 98.9 (12 Mar 2024 01:40)  HR: 72 (12 Mar 2024 09:45) (68 - 84)  BP: 127/70 (12 Mar 2024 09:45) (117/69 - 133/85)  BP(mean): 83 (11 Mar 2024 18:30) (79 - 85)  RR: 20 (12 Mar 2024 09:45) (14 - 22)  SpO2: 100% (12 Mar 2024 09:45) (95% - 100%)    Parameters below as of 12 Mar 2024 09:45  Patient On (Oxygen Delivery Method): room air        PHYSICAL EXAM:    GENERAL: NAD, well-groomed, well-developed  HEAD:  Atraumatic, Normocephalic  EYES: Conjunctiva and sclera clear  ENMT: Moist mucous membranes  RESPIRATORY: Clear to auscultation bilaterally; No rales, rhonchi, wheezing, or rubs  CARDIOVASCULAR: Regular rate and rhythm; No murmurs, rubs, or gallops  GASTROINTESTINAL: Soft, Nontender, Nondistended; Bowel sounds present  GENITOURINARY: severino in place   EXTREMITIES:  2+ Peripheral Pulses, No clubbing, cyanosis, or edema  NERVOUS SYSTEM:  Alert & Oriented X3; Moving all 4 extremities; No gross sensory deficits  HEME/LYMPH: No lymphadenopathy noted  SKIN: Abd Incisions C/D/I    LABS:                        11.3   8.25  )-----------( 202      ( 12 Mar 2024 09:20 )             33.3     03-12    141  |  104  |  13  ----------------------------<  130<H>  4.1   |  25  |  1.25    Ca    9.1      12 Mar 2024 09:20        Urinalysis Basic - ( 12 Mar 2024 09:20 )    Color: x / Appearance: x / SG: x / pH: x  Gluc: 130 mg/dL / Ketone: x  / Bili: x / Urobili: x   Blood: x / Protein: x / Nitrite: x   Leuk Esterase: x / RBC: x / WBC x   Sq Epi: x / Non Sq Epi: x / Bacteria: x    [x] Care Discussed with Consultants/Other Providers: Urology PA - discussed dc planning for today

## 2024-03-12 NOTE — CONSULT NOTE ADULT - PROBLEM SELECTOR RECOMMENDATION 9
s/p RALP on 3/11    - Care per primary  team   - Advance diet per  protocol   - Severino care, to be discharged w/ severino x1 week   - Pain control + bowel regimen   - Encourage OOB and incentive spirometry   - DVT ppx: HSQ  - AM labs reviewed and stable   - ROBI drain care per uro.

## 2024-03-12 NOTE — DISCHARGE NOTE PROVIDER - NSDCFUSCHEDAPPT_GEN_ALL_CORE_FT
[facility]  PSYCHIATRY [address]  Scheduled Appointment: 03/21/2024    [facility]  PSYCHIATRY [address]  Scheduled Appointment: 03/28/2024    Niki Fong  [facility]  CARDIOLOGY [address] [facility]   Scheduled Appointment: 04/09/2024    Fran Leiva  [facility]  INFDISEASE [address]  Scheduled Appointment: 04/19/2024    [facility]  CARDIOLOGY [address] [facility]   Scheduled Appointment: 05/09/2024    Jordy Brizuela  [facility]  CARDIOLOGY [address] [facility]   Scheduled Appointment: 05/09/2024

## 2024-03-12 NOTE — DISCHARGE NOTE NURSING/CASE MANAGEMENT/SOCIAL WORK - NSDCPNINST_GEN_ALL_CORE
Notify Dr Bruner if you experience any increase in pain not relieved with medication, any redness, drainage or swelling around incision, any persistent nausea vomiting, any dark red blood or clots in urine or any fever >100.5.  Drink plenty of fluids.  No heavy lifting or straining.  Arriola care as instructed, use leg bag during the day and large drainage bag at night.  Continue to practice good hand hygiene to prevent any infection.  Use over the counter stool softeners to assist with constipation.

## 2024-03-12 NOTE — DISCHARGE NOTE NURSING/CASE MANAGEMENT/SOCIAL WORK - NSDCPEFALRISK_GEN_ALL_CORE
For information on Fall & Injury Prevention, visit: [email or web address] OR  [email or web address] OR  https://www.cdc.gov/steadi/patient.html

## 2024-03-12 NOTE — PROGRESS NOTE ADULT - SUBJECTIVE AND OBJECTIVE BOX
POD #1  Afeb 125/73 68 100%RA    Pt has no c/o  Abd- soft NT ND; no flatus     wounds C&D  Zeinab 1.7L  ROBI 175

## 2024-03-12 NOTE — DISCHARGE NOTE PROVIDER - NSDCMRMEDTOKEN_GEN_ALL_CORE_FT
amLODIPine 5 mg oral tablet: 1 tab(s) orally once a day  ezetimibe 10 mg oral tablet: 1 tab(s) orally once a day  famotidine 40 mg oral tablet: 1 tab(s) orally as needed for acid reflux symptoms  Farxiga 5 mg oral tablet: 1 tab(s) orally once a day  Genvoya oral tablet: 1 tab(s) orally once a day  lisinopril-hydrochlorothiazide 20 mg-12.5 mg oral tablet: 1 tab(s) orally once a day  Mounjaro 2.5 mg/0.5 mL subcutaneous solution: 2.5 milligram(s) subcutaneously once a week  rosuvastatin 10 mg oral tablet: 1 tab(s) orally once a day  turmeric 1000 mg oral capsule: 2 cap(s) orally once a day LD 3/4/2024  Vitamin D 1 cap oral daily:    acetaminophen 325 mg oral tablet: 3 tab(s) orally every 6 hours  amLODIPine 5 mg oral tablet: 1 tab(s) orally once a day  elvitegravir/cobicistat/emtricitabine/tenofovir alafenamide 150 mg-150 mg-200 mg-10 mg oral tablet: 1 tab(s) orally once a day  ezetimibe 10 mg oral tablet: 1 tab(s) orally once a day  famotidine 40 mg oral tablet: 1 tab(s) orally once a day as needed for acid reflux symptoms  Farxiga 5 mg oral tablet: 1 tab(s) orally once a day  hydrocortisone 1% topical cream: 1 Apply topically to affected area 3 times a day  lidocaine 5% topical ointment: 1 Apply topically to affected area every 3 hours  lisinopril-hydrochlorothiazide 20 mg-12.5 mg oral tablet: 1 tab(s) orally once a day  Mounjaro 2.5 mg/0.5 mL subcutaneous solution: 2.5 milligram(s) subcutaneously once a week  rosuvastatin 10 mg oral tablet: 1 tab(s) orally once a day  turmeric 1000 mg oral capsule: 2 cap(s) orally once a day LD 3/4/2024  Vitamin D 1 cap oral daily:

## 2024-03-14 PROBLEM — E78.5 HYPERLIPIDEMIA, MILD: Status: ACTIVE | Noted: 2020-08-05

## 2024-03-14 NOTE — H&P PST ADULT - PROBLEM SELECTOR PLAN 5
Findings: No rash.   Neurological:      Mental Status: She is alert and oriented to person, place, and time.      Sensory: Sensation is intact.      Motor: Motor function is intact.      Deep Tendon Reflexes:      Reflex Scores:       Patellar reflexes are 2+ on the right side and 2+ on the left side.       Achilles reflexes are 2+ on the right side and 2+ on the left side.  Psychiatric:         Behavior: Behavior is cooperative.         Assessment:      Fallon was seen today for back pain.    Diagnoses and all orders for this visit:    Strain of lumbar region, initial encounter  -     HYDROcodone-acetaminophen (LORCET) 5-325 MG per tablet; Take 1 tablet by mouth every 8 hours as needed for Pain for up to 7 days. Take lowest dose possible to manage pain Max Daily Amount: 3 tablets    Other orders  -     predniSONE (DELTASONE) 20 MG tablet; 1 TID for 4 day then 1 BID  -     tiZANidine (ZANAFLEX) 2 MG tablet; Take 1 tablet by mouth every 8 hours as needed (back spasm)    OARRS report checked          Plan:      Begin prednisone along with a muscle relaxant to take on a 3 times daily as needed basis.  Patient requests pain medication and advised that she can only get 1 week of pain medication.    Keep appoint with neurosurgeon    We did discuss starting gabapentin medication if no improvement.    RTC as needed    Please note that this chart was generated using Dragon dictation software. Although every effort was made to ensure the accuracy of this automated transcription, some errors in transcription may have occurred.             Pt c//o chest tightness hx CAD pt refereed to cardiologist

## 2024-03-18 ENCOUNTER — APPOINTMENT (OUTPATIENT)
Dept: UROLOGY | Facility: CLINIC | Age: 54
End: 2024-03-18
Payer: MEDICAID

## 2024-03-18 ENCOUNTER — OUTPATIENT (OUTPATIENT)
Dept: OUTPATIENT SERVICES | Facility: HOSPITAL | Age: 54
LOS: 1 days | End: 2024-03-18
Payer: MEDICAID

## 2024-03-18 ENCOUNTER — APPOINTMENT (OUTPATIENT)
Dept: CARDIOLOGY | Facility: CLINIC | Age: 54
End: 2024-03-18
Payer: MEDICAID

## 2024-03-18 VITALS
WEIGHT: 245 LBS | SYSTOLIC BLOOD PRESSURE: 121 MMHG | BODY MASS INDEX: 30.46 KG/M2 | HEART RATE: 86 BPM | TEMPERATURE: 98.1 F | RESPIRATION RATE: 17 BRPM | DIASTOLIC BLOOD PRESSURE: 78 MMHG | HEIGHT: 75 IN

## 2024-03-18 DIAGNOSIS — Z98.890 OTHER SPECIFIED POSTPROCEDURAL STATES: Chronic | ICD-10-CM

## 2024-03-18 DIAGNOSIS — M20.41 OTHER HAMMER TOE(S) (ACQUIRED), RIGHT FOOT: Chronic | ICD-10-CM

## 2024-03-18 DIAGNOSIS — E78.5 HYPERLIPIDEMIA, UNSPECIFIED: ICD-10-CM

## 2024-03-18 DIAGNOSIS — Z09 ENCOUNTER FOR FOLLOW-UP EXAMINATION AFTER COMPLETED TREATMENT FOR CONDITIONS OTHER THAN MALIGNANT NEOPLASM: ICD-10-CM

## 2024-03-18 PROCEDURE — 51700 IRRIGATION OF BLADDER: CPT

## 2024-03-18 PROCEDURE — 99024 POSTOP FOLLOW-UP VISIT: CPT

## 2024-03-18 PROCEDURE — G2211 COMPLEX E/M VISIT ADD ON: CPT | Mod: NC,1L,95

## 2024-03-18 PROCEDURE — 99205 OFFICE O/P NEW HI 60 MIN: CPT | Mod: 95

## 2024-03-18 NOTE — ASSESSMENT
[FreeTextEntry1] : Patient is here today for a trial of void for a catheter removal post RALP. Catheter was removed post balloon deflation. Patient will follow up per Dr. Franc francisco given to patient and education given for swollen scrotum.   Patient to continue with physical therapy next week for urinary leakage. Educated about the importance of physical therapy to help minimize leakage.   Pathology not yet resulted, will call with results.  RTO 6 weeks - appt 4/30/24 Fluids and ED precautions reviewed.

## 2024-03-18 NOTE — PHYSICAL EXAM
[Normal Appearance] : normal appearance [Well Groomed] : well groomed [General Appearance - In No Acute Distress] : no acute distress [Edema] : no peripheral edema [Respiration, Rhythm And Depth] : normal respiratory rhythm and effort [Exaggerated Use Of Accessory Muscles For Inspiration] : no accessory muscle use [Abdomen Soft] : soft [Abdomen Tenderness] : non-tender [Costovertebral Angle Tenderness] : no ~M costovertebral angle tenderness [Urinary Bladder Findings] : the bladder was normal on palpation [Normal Station and Gait] : the gait and station were normal for the patient's age [] : no rash [No Focal Deficits] : no focal deficits [Oriented To Time, Place, And Person] : oriented to person, place, and time [Mood] : the mood was normal [Affect] : the affect was normal [de-identified] : Abdominal Steris CDI, some steris off, bacitracin to site  [de-identified] : Penis and scrotal swelling

## 2024-03-18 NOTE — HISTORY OF PRESENT ILLNESS
[FreeTextEntry1] : VENU SANDOVAL returns to the office today. He is a 53-year-old man who underwent a radical prostatectomy last week. He reports feeling well with a full appetite. He denies any post operative pain. He is moving his bowels. Fluids: 48  oz of water and 3-4 cups of green tea  PFDPT was started in Jan with a break for past 1 month and will resume a week from today. Works as an  and currently on leave

## 2024-03-19 DIAGNOSIS — Z09 ENCOUNTER FOR FOLLOW-UP EXAMINATION AFTER COMPLETED TREATMENT FOR CONDITIONS OTHER THAN MALIGNANT NEOPLASM: ICD-10-CM

## 2024-03-19 DIAGNOSIS — C61 MALIGNANT NEOPLASM OF PROSTATE: ICD-10-CM

## 2024-03-19 LAB — SURGICAL PATHOLOGY STUDY: SIGNIFICANT CHANGE UP

## 2024-03-21 ENCOUNTER — APPOINTMENT (OUTPATIENT)
Dept: PSYCHIATRY | Facility: CLINIC | Age: 54
End: 2024-03-21
Payer: MEDICAID

## 2024-03-21 PROCEDURE — 90837 PSYTX W PT 60 MINUTES: CPT | Mod: 95

## 2024-03-22 NOTE — PLAN
[FreeTextEntry2] : Pt seeks to decrease anxiety symptoms.  [de-identified] : Psychologist met with Pt using teletherapy platform. Pt was at their home while psychologist was working remotely via [facility]. Pt continued to provide consent for telehealth services.   Affect was observed to be full. Mood was reported as unchanged. Pt was responsive, receptive, and engaged throughout session. Evidence of risk was neither observed nor reported.   Goals of the session were to review Pt's recovery following a recent medical procedure, revisit activities of value/interest to build/maintain motivation, and discuss coping strategies. Pt will continue engaging in activities discussed in session for hw.  [FreeTextEntry1] : Psychoeducation about Diagnosis & Treatment Model  1. Pt will learn about their symptoms and diagnosis and be able to explain it back to psychologist.  2. Pt will learn about the CBT model and be able to accurately categorize their symptoms and diagnosis in terms of cognition, emotional responses, behaviors, and physiological arousal.   Cognitive Therapy Interventions   1. Pt can benefit from learning and practicing cognitive restructuring, including learning to categorize automatic thoughts/cognitive distortions, judgments, biases, and attributions   2. Pt can benefit from using cognitive reframing to challenge cognitive distortions to increase flexibility in thinking   3. Pt can benefit from using self-coping statements to encourage themselves and provide validation   4. Cognitive restructuring will be learned and practiced within session, and assigned for homework between sessions. This will entail interventions such as mood monitoring and thought record forms, to challenge cognitive distortions in real-time.     Relaxation Techniques  1. Pt can benefit from learning and practicing Relaxation Techniques to reduce physiological arousal, including flight/flight/freeze responses, manifested by muscle tightness or tension; emotions including anxiety and panic, sadness and depression, and anger.  2. Relaxation techniques can include any or all of the following: Diaphragmatic Breathing (DB), Progressive Muscle Relaxation (PMR), Imaginal Relaxation (IMR), or mindfulness.  3. Relaxation techniques will be learned and practiced within sessions, and assigned for homework between sessions. This will entail interventions such using monitoring forms to track their stress and other symptoms, and assess the efficacy of their implementation of relaxation techniques.   Exposure Therapy Interventions   1. Pt can benefit from learning/continuing exposure therapy interventions, involving confronting situational, emotional, physiological, and cognitive avoided situations.   2. Pt can benefit from reducing and eliminating safety behaviors/objects, such as maladaptive response/ritualization prevention, removing "just in case" items that maintain anxiety, panic, obsessive, and post-traumatic symptoms.   3. Exposure therapy involves any or all of these modalities: in vivo, imaginal, interoceptive, Virtual Reality, written/narrative, and analog in vivo exposure   4. Exposures will be conducted within psychotherapy sessions and assigned for homework between sessions

## 2024-03-22 NOTE — END OF VISIT
[FreeTextEntry3] : Home fine   [FreeTextEntry5] : Remote office via [facility]

## 2024-03-28 ENCOUNTER — APPOINTMENT (OUTPATIENT)
Dept: PSYCHIATRY | Facility: CLINIC | Age: 54
End: 2024-03-28
Payer: MEDICAID

## 2024-03-28 PROCEDURE — 90837 PSYTX W PT 60 MINUTES: CPT | Mod: 95

## 2024-03-28 NOTE — REASON FOR VISIT
[Continuity of care] : to ensure continuity of care [Patient preference] : as per patient preference [Continuing, patient not seen in-person within last 12 months (provide details below)] : Telehealth services are continuing, patient not seen in-person within last 12 months.  [Telehealth (audio & video) - Individual/Group] : This visit was provided via telehealth using real-time 2-way audio visual technology. [Other Location: e.g. Home (Enter Location, City,State)___] : The provider was located at [unfilled]. [Home] : The patient, [unfilled], was located at home, [unfilled], at the time of the visit. [FreeTextEntry4] : 5:00PM [FreeTextEntry5] : 6:00PM [Patient] : Patient [FreeTextEntry1] : anxiety

## 2024-03-28 NOTE — END OF VISIT
[Duration of Psychotherapy Visit (minutes spent in synchronous communication): ____] : Duration of Psychotherapy Visit (minutes spent in synchronous communication): [unfilled] [Teletherapy Service Provided] : The services provided in this session were delivered via tele-therapy [Individual Psychotherapy for 53+ minutes] : Individual Psychotherapy for 53+ minutes  [FreeTextEntry3] : Home [FreeTextEntry5] : Remote office via [facility]

## 2024-03-28 NOTE — PLAN
[FreeTextEntry2] : Pt seeks to decrease anxiety symptoms.  [Cognitive and/or Behavior Therapy] : Cognitive and/or Behavior Therapy  [Psychoeducation] : Psychoeducation  [de-identified] : Psychologist met with Pt using teletherapy platform. Pt was at their home while psychologist was working remotely via [facility]. Pt continued to provide consent for telehealth services.   Affect was observed to be full. Mood was reported as unchanged. Pt was responsive, receptive, and engaged throughout session. Evidence of risk was neither observed nor reported.   Psychologist administered PHQ9 and GAD7 assessment measures. PHQ9 yielded a total of 7 (Somewhat difficult). GAD7 yielded a total of 2 (Somewhat difficult). Pt reported concerns about depression in the future. Goals of the session were to identify activities of value/interest and identify behavioral goals for the following week, which Pt will complete for hw.  [Return in ____ week(s)] : Return in [unfilled] week(s) [FreeTextEntry1] : Psychoeducation about Diagnosis & Treatment Model  1. Pt will learn about their symptoms and diagnosis and be able to explain it back to psychologist.  2. Pt will learn about the CBT model and be able to accurately categorize their symptoms and diagnosis in terms of cognition, emotional responses, behaviors, and physiological arousal.   Cognitive Therapy Interventions   1. Pt can benefit from learning and practicing cognitive restructuring, including learning to categorize automatic thoughts/cognitive distortions, judgments, biases, and attributions   2. Pt can benefit from using cognitive reframing to challenge cognitive distortions to increase flexibility in thinking   3. Pt can benefit from using self-coping statements to encourage themselves and provide validation   4. Cognitive restructuring will be learned and practiced within session, and assigned for homework between sessions. This will entail interventions such as mood monitoring and thought record forms, to challenge cognitive distortions in real-time.     Relaxation Techniques  1. Pt can benefit from learning and practicing Relaxation Techniques to reduce physiological arousal, including flight/flight/freeze responses, manifested by muscle tightness or tension; emotions including anxiety and panic, sadness and depression, and anger.  2. Relaxation techniques can include any or all of the following: Diaphragmatic Breathing (DB), Progressive Muscle Relaxation (PMR), Imaginal Relaxation (IMR), or mindfulness.  3. Relaxation techniques will be learned and practiced within sessions, and assigned for homework between sessions. This will entail interventions such using monitoring forms to track their stress and other symptoms, and assess the efficacy of their implementation of relaxation techniques.   Exposure Therapy Interventions   1. Pt can benefit from learning/continuing exposure therapy interventions, involving confronting situational, emotional, physiological, and cognitive avoided situations.   2. Pt can benefit from reducing and eliminating safety behaviors/objects, such as maladaptive response/ritualization prevention, removing "just in case" items that maintain anxiety, panic, obsessive, and post-traumatic symptoms.   3. Exposure therapy involves any or all of these modalities: in vivo, imaginal, interoceptive, Virtual Reality, written/narrative, and analog in vivo exposure   4. Exposures will be conducted within psychotherapy sessions and assigned for homework between sessions

## 2024-04-04 ENCOUNTER — APPOINTMENT (OUTPATIENT)
Dept: PSYCHIATRY | Facility: CLINIC | Age: 54
End: 2024-04-04
Payer: MEDICAID

## 2024-04-04 PROCEDURE — 90837 PSYTX W PT 60 MINUTES: CPT | Mod: 95

## 2024-04-04 NOTE — END OF VISIT
[Duration of Psychotherapy Visit (minutes spent in synchronous communication): ____] : Duration of Psychotherapy Visit (minutes spent in synchronous communication): [unfilled] [Individual Psychotherapy for 53+ minutes] : Individual Psychotherapy for 53+ minutes  [Teletherapy Service Provided] : The services provided in this session were delivered via tele-therapy [FreeTextEntry3] : Home [FreeTextEntry5] : Remote office via [facility]

## 2024-04-04 NOTE — PLAN
[FreeTextEntry2] : Pt seeks to decrease anxiety symptoms.  [Cognitive and/or Behavior Therapy] : Cognitive and/or Behavior Therapy  [Psychoeducation] : Psychoeducation  [de-identified] : Psychologist met with Pt using teletherapy platform. Pt was at their home while psychologist was working remotely via [facility]. Pt continued to provide consent for telehealth services.   Affect was observed to be full. Mood was reported as unchanged. Pt was responsive, receptive, and engaged throughout session. Evidence of risk was neither observed nor reported.   Pt reported decreased energy for approximately two days since the previous appointment. Pt noted upcoming appointments with PCP and urologist following recent medical procedure, and will address concerns during the meetings as well. Goals of the session were to review completed activities, revisit thoughts and emotions regarding employment and acceptance concepts, and set behavioral targets for the following week (which Pt will complete for hw).  [Return in ____ week(s)] : Return in [unfilled] week(s) [FreeTextEntry1] : Psychoeducation about Diagnosis & Treatment Model  1. Pt will learn about their symptoms and diagnosis and be able to explain it back to psychologist.  2. Pt will learn about the CBT model and be able to accurately categorize their symptoms and diagnosis in terms of cognition, emotional responses, behaviors, and physiological arousal.   Cognitive Therapy Interventions   1. Pt can benefit from learning and practicing cognitive restructuring, including learning to categorize automatic thoughts/cognitive distortions, judgments, biases, and attributions   2. Pt can benefit from using cognitive reframing to challenge cognitive distortions to increase flexibility in thinking   3. Pt can benefit from using self-coping statements to encourage themselves and provide validation   4. Cognitive restructuring will be learned and practiced within session, and assigned for homework between sessions. This will entail interventions such as mood monitoring and thought record forms, to challenge cognitive distortions in real-time.     Relaxation Techniques  1. Pt can benefit from learning and practicing Relaxation Techniques to reduce physiological arousal, including flight/flight/freeze responses, manifested by muscle tightness or tension; emotions including anxiety and panic, sadness and depression, and anger.  2. Relaxation techniques can include any or all of the following: Diaphragmatic Breathing (DB), Progressive Muscle Relaxation (PMR), Imaginal Relaxation (IMR), or mindfulness.  3. Relaxation techniques will be learned and practiced within sessions, and assigned for homework between sessions. This will entail interventions such using monitoring forms to track their stress and other symptoms, and assess the efficacy of their implementation of relaxation techniques.   Exposure Therapy Interventions   1. Pt can benefit from learning/continuing exposure therapy interventions, involving confronting situational, emotional, physiological, and cognitive avoided situations.   2. Pt can benefit from reducing and eliminating safety behaviors/objects, such as maladaptive response/ritualization prevention, removing "just in case" items that maintain anxiety, panic, obsessive, and post-traumatic symptoms.   3. Exposure therapy involves any or all of these modalities: in vivo, imaginal, interoceptive, Virtual Reality, written/narrative, and analog in vivo exposure   4. Exposures will be conducted within psychotherapy sessions and assigned for homework between sessions

## 2024-04-09 ENCOUNTER — APPOINTMENT (OUTPATIENT)
Dept: CARDIOLOGY | Facility: CLINIC | Age: 54
End: 2024-04-09
Payer: MEDICAID

## 2024-04-09 VITALS — WEIGHT: 237 LBS | BODY MASS INDEX: 29.47 KG/M2 | HEIGHT: 75 IN

## 2024-04-09 PROCEDURE — G2211 COMPLEX E/M VISIT ADD ON: CPT | Mod: NC,1L,95

## 2024-04-09 PROCEDURE — 99213 OFFICE O/P EST LOW 20 MIN: CPT

## 2024-04-11 ENCOUNTER — APPOINTMENT (OUTPATIENT)
Dept: PSYCHIATRY | Facility: CLINIC | Age: 54
End: 2024-04-11
Payer: MEDICAID

## 2024-04-11 PROCEDURE — 90837 PSYTX W PT 60 MINUTES: CPT | Mod: 95

## 2024-04-18 ENCOUNTER — APPOINTMENT (OUTPATIENT)
Dept: PSYCHIATRY | Facility: CLINIC | Age: 54
End: 2024-04-18
Payer: MEDICAID

## 2024-04-18 ENCOUNTER — NON-APPOINTMENT (OUTPATIENT)
Age: 54
End: 2024-04-18

## 2024-04-18 PROCEDURE — 90837 PSYTX W PT 60 MINUTES: CPT | Mod: 95

## 2024-04-19 ENCOUNTER — APPOINTMENT (OUTPATIENT)
Dept: INFECTIOUS DISEASE | Facility: CLINIC | Age: 54
End: 2024-04-19
Payer: MEDICAID

## 2024-04-19 VITALS
WEIGHT: 242 LBS | TEMPERATURE: 98.3 F | BODY MASS INDEX: 30.09 KG/M2 | DIASTOLIC BLOOD PRESSURE: 81 MMHG | SYSTOLIC BLOOD PRESSURE: 141 MMHG | OXYGEN SATURATION: 98 % | HEART RATE: 88 BPM | HEIGHT: 75 IN

## 2024-04-19 DIAGNOSIS — Z92.89 PERSONAL HISTORY OF OTHER MEDICAL TREATMENT: ICD-10-CM

## 2024-04-19 LAB
25(OH)D3 SERPL-MCNC: 48.6 NG/ML
ALBUMIN SERPL ELPH-MCNC: 4.6 G/DL
ALP BLD-CCNC: 75 U/L
ALT SERPL-CCNC: 22 U/L
ANION GAP SERPL CALC-SCNC: 11 MMOL/L
AST SERPL-CCNC: 22 U/L
BILIRUB SERPL-MCNC: 0.4 MG/DL
BUN SERPL-MCNC: 6 MG/DL
CALCIUM SERPL-MCNC: 9.6 MG/DL
CHLORIDE SERPL-SCNC: 106 MMOL/L
CHOLEST SERPL-MCNC: 158 MG/DL
CO2 SERPL-SCNC: 25 MMOL/L
CREAT SERPL-MCNC: 1.36 MG/DL
CYSTATIN C SERPL-MCNC: 1.04 MG/L
EGFR: 62 ML/MIN/1.73M2
GFR/BSA.PRED SERPLBLD CYS-BASED-ARV: 76 ML/MIN/1.73M2
GLUCOSE SERPL-MCNC: 93 MG/DL
HCT VFR BLD CALC: 36.7 %
HDLC SERPL-MCNC: 42 MG/DL
HGB BLD-MCNC: 12.5 G/DL
LDLC SERPL CALC-MCNC: 85 MG/DL
MCHC RBC-ENTMCNC: 29 PG
MCHC RBC-ENTMCNC: 34.1 GM/DL
MCV RBC AUTO: 85.2 FL
NONHDLC SERPL-MCNC: 116 MG/DL
PLATELET # BLD AUTO: 252 K/UL
POTASSIUM SERPL-SCNC: 3.9 MMOL/L
PROT SERPL-MCNC: 7.5 G/DL
PSA SERPL-MCNC: <0.01 NG/ML
RBC # BLD: 4.31 M/UL
RBC # FLD: 13.3 %
SODIUM SERPL-SCNC: 142 MMOL/L
TRIGL SERPL-MCNC: 179 MG/DL
TSH SERPL-ACNC: 1.4 UIU/ML
WBC # FLD AUTO: 3.52 K/UL

## 2024-04-19 PROCEDURE — 99214 OFFICE O/P EST MOD 30 MIN: CPT

## 2024-04-22 LAB
APPEARANCE: CLEAR
BACTERIA: NEGATIVE /HPF
BILIRUBIN URINE: NEGATIVE
BLOOD URINE: NEGATIVE
C TRACH RRNA SPEC QL NAA+PROBE: NOT DETECTED
CAST: 1 /LPF
CD3 CELLS # BLD: 1059 CELLS/UL
CD3 CELLS NFR BLD: 65 %
CD3+CD4+ CELLS # BLD: 326 CELLS/UL
CD3+CD4+ CELLS NFR BLD: 20 %
CD3+CD4+ CELLS/CD3+CD8+ CLL SPEC: 0.48 RATIO
CD3+CD8+ CELLS # SPEC: 683 CELLS/UL
CD3+CD8+ CELLS NFR BLD: 42 %
COLOR: YELLOW
EPITHELIAL CELLS: 1 /HPF
ESTIMATED AVERAGE GLUCOSE: 108 MG/DL
GLUCOSE QUALITATIVE U: >=1000 MG/DL
HBA1C MFR BLD HPLC: 5.4 %
HIV1 RNA # SERPL NAA+PROBE: NORMAL
HIV1 RNA # SERPL NAA+PROBE: NORMAL COPIES/ML
KETONES URINE: NEGATIVE MG/DL
LEUKOCYTE ESTERASE URINE: NEGATIVE
MICROSCOPIC-UA: NORMAL
N GONORRHOEA RRNA SPEC QL NAA+PROBE: NOT DETECTED
NITRITE URINE: NEGATIVE
PH URINE: 6
PROTEIN URINE: NORMAL MG/DL
RED BLOOD CELLS URINE: 1 /HPF
SOURCE AMPLIFICATION: NORMAL
SPECIFIC GRAVITY URINE: 1.02
T PALLIDUM AB SER QL IA: NEGATIVE
UROBILINOGEN URINE: 0.2 MG/DL
VIRAL LOAD INTERP: NORMAL
VIRAL LOAD LOG: NORMAL LG COP/ML
WHITE BLOOD CELLS URINE: 4 /HPF

## 2024-04-23 PROBLEM — C61 PROSTATE CANCER: Status: ACTIVE | Noted: 2024-01-11

## 2024-04-25 ENCOUNTER — APPOINTMENT (OUTPATIENT)
Dept: PSYCHIATRY | Facility: CLINIC | Age: 54
End: 2024-04-25
Payer: MEDICAID

## 2024-04-25 PROCEDURE — 90837 PSYTX W PT 60 MINUTES: CPT | Mod: 95

## 2024-04-25 RX ORDER — EZETIMIBE 10 MG/1
10 TABLET ORAL
Qty: 90 | Refills: 7 | Status: ACTIVE | COMMUNITY
Start: 2021-02-24 | End: 1900-01-01

## 2024-04-26 NOTE — PLAN
[FreeTextEntry2] : Pt seeks to decrease anxiety symptoms.  [de-identified] : Psychologist met with Pt using teletherapy platform. Pt was at their home while psychologist was working remotely via [facility]. Pt continued to provide consent for telehealth services.   Affect was observed to be full. Mood was reported as unchanged. Pt was responsive, receptive, and engaged throughout session. Evidence of risk was neither observed nor reported.   Goals of the session were to review hw/tasks completed since the previous appointment. Session also involved discussion about thoughts and beliefs associated with recent stressors and events. Pt strengths were identified. Pt will continue practicing adaptive coping skills for hw.   [FreeTextEntry1] : Psychoeducation about Diagnosis & Treatment Model  1. Pt will learn about their symptoms and diagnosis and be able to explain it back to psychologist.  2. Pt will learn about the CBT model and be able to accurately categorize their symptoms and diagnosis in terms of cognition, emotional responses, behaviors, and physiological arousal.   Cognitive Therapy Interventions   1. Pt can benefit from learning and practicing cognitive restructuring, including learning to categorize automatic thoughts/cognitive distortions, judgments, biases, and attributions   2. Pt can benefit from using cognitive reframing to challenge cognitive distortions to increase flexibility in thinking   3. Pt can benefit from using self-coping statements to encourage themselves and provide validation   4. Cognitive restructuring will be learned and practiced within session, and assigned for homework between sessions. This will entail interventions such as mood monitoring and thought record forms, to challenge cognitive distortions in real-time.     Relaxation Techniques  1. Pt can benefit from learning and practicing Relaxation Techniques to reduce physiological arousal, including flight/flight/freeze responses, manifested by muscle tightness or tension; emotions including anxiety and panic, sadness and depression, and anger.  2. Relaxation techniques can include any or all of the following: Diaphragmatic Breathing (DB), Progressive Muscle Relaxation (PMR), Imaginal Relaxation (IMR), or mindfulness.  3. Relaxation techniques will be learned and practiced within sessions, and assigned for homework between sessions. This will entail interventions such using monitoring forms to track their stress and other symptoms, and assess the efficacy of their implementation of relaxation techniques.   Exposure Therapy Interventions   1. Pt can benefit from learning/continuing exposure therapy interventions, involving confronting situational, emotional, physiological, and cognitive avoided situations.   2. Pt can benefit from reducing and eliminating safety behaviors/objects, such as maladaptive response/ritualization prevention, removing "just in case" items that maintain anxiety, panic, obsessive, and post-traumatic symptoms.   3. Exposure therapy involves any or all of these modalities: in vivo, imaginal, interoceptive, Virtual Reality, written/narrative, and analog in vivo exposure   4. Exposures will be conducted within psychotherapy sessions and assigned for homework between sessions

## 2024-04-26 NOTE — END OF VISIT
[FreeTextEntry3] : Home [FreeTextEntry5] : Remote office via [facility]

## 2024-04-30 ENCOUNTER — APPOINTMENT (OUTPATIENT)
Dept: UROLOGY | Facility: CLINIC | Age: 54
End: 2024-04-30
Payer: MEDICAID

## 2024-04-30 VITALS
SYSTOLIC BLOOD PRESSURE: 138 MMHG | WEIGHT: 243 LBS | HEART RATE: 79 BPM | DIASTOLIC BLOOD PRESSURE: 68 MMHG | BODY MASS INDEX: 30.21 KG/M2 | HEIGHT: 75 IN | RESPIRATION RATE: 16 BRPM

## 2024-04-30 DIAGNOSIS — C61 MALIGNANT NEOPLASM OF PROSTATE: ICD-10-CM

## 2024-04-30 DIAGNOSIS — R68.82 DECREASED LIBIDO: ICD-10-CM

## 2024-04-30 DIAGNOSIS — N52.31 ERECTILE DYSFUNCTION FOLLOWING RADICAL PROSTATECTOMY: ICD-10-CM

## 2024-04-30 DIAGNOSIS — N39.3 STRESS INCONTINENCE (FEMALE) (MALE): ICD-10-CM

## 2024-04-30 PROCEDURE — 99024 POSTOP FOLLOW-UP VISIT: CPT

## 2024-04-30 PROCEDURE — G2211 COMPLEX E/M VISIT ADD ON: CPT | Mod: NC,1L

## 2024-05-01 LAB
PSA SERPL-MCNC: <0.01 NG/ML
TESTOST SERPL-MCNC: 177 NG/DL

## 2024-05-01 NOTE — DISEASE MANAGEMENT
[c] : c [0] : N0 [X] : MX [0-10] : 0 -10 ng/mL [Biopsy with Fusion] : Patient had a biopsy with fusion on [7(3+4)] : Fusion Biopsy Vowinckel Score: 7(3+4) [Biopsy results sent to PCP/Referring Physician] : Biopsy results sent to PCP/Referring Physician [] : Patient had a Prostate MRI [4] : 4 [IIC] : IIC [Radical Prostatectomy] : Radical Prostatectomy [Patient had a radical prostatectomy] : Patient had a radical prostatectomy  [7(4+3)] : Rosa Score 7(4+3) [Negative] : Negative margins [2] : T2 [1] : N1 [BiopsyDate] : 12/23 [MeasuredProstateVolume] : 38 [TotalCores] : 13 [TotalPositiveCores] : 6 [MaxCoreInvolvement] : 80 [RadicalProstatectomyDate] : 03/24 [TotalNumberofnodesresected] : 17 [PositiveNodes] : 2

## 2024-05-01 NOTE — PHYSICAL EXAM
[Normal] : oriented to person, place and time, the affect was normal, the mood was normal and not anxious [FreeTextEntry1] : Discussed R/B robotic surgery and expectations. Patient amenable, will schedule surgery

## 2024-05-01 NOTE — HISTORY OF PRESENT ILLNESS
[FreeTextEntry1] : VENU SANDOVAL returns to the office today. He is a 53 year-old man who underwent a radical prostatectomy in March. Pathology showed Gamerco 4+3, pT2 pN1 with all negative margins.  He reports feeling well with a full appetite. He had his PSA drawn last week and it was undetectable.   Urinary control: incontinence with coughing and sneezing, recently started pelvic floor PT. Wears 1-2 pads a day. Reports urinary frequency, the stream was stronger after surgery and has gotten a little bit weaker. Denies hematuria or dysuria.    Sexual Function: No sexual function. Reports low libido.

## 2024-05-02 ENCOUNTER — APPOINTMENT (OUTPATIENT)
Dept: PSYCHIATRY | Facility: CLINIC | Age: 54
End: 2024-05-02
Payer: MEDICAID

## 2024-05-02 PROCEDURE — 90837 PSYTX W PT 60 MINUTES: CPT | Mod: 95

## 2024-05-02 NOTE — PLAN
[Cognitive and/or Behavior Therapy] : Cognitive and/or Behavior Therapy  [Psychoeducation] : Psychoeducation  [Return in ____ week(s)] : Return in [unfilled] week(s)

## 2024-05-02 NOTE — REASON FOR VISIT
[Patient preference] : as per patient preference [Continuity of care] : to ensure continuity of care [Continuing, patient not seen in-person within last 12 months (provide details below)] : Telehealth services are continuing, patient not seen in-person within last 12 months.  [Telehealth (audio & video) - Individual/Group] : This visit was provided via telehealth using real-time 2-way audio visual technology. [Other Location: e.g. Home (Enter Location, City,State)___] : The provider was located at [unfilled]. [Home] : The patient, [unfilled], was located at home, [unfilled], at the time of the visit. [Patient] : Patient

## 2024-05-07 ENCOUNTER — RX RENEWAL (OUTPATIENT)
Age: 54
End: 2024-05-07

## 2024-05-09 ENCOUNTER — APPOINTMENT (OUTPATIENT)
Dept: CARDIOLOGY | Facility: CLINIC | Age: 54
End: 2024-05-09
Payer: MEDICAID

## 2024-05-09 ENCOUNTER — APPOINTMENT (OUTPATIENT)
Dept: PSYCHIATRY | Facility: CLINIC | Age: 54
End: 2024-05-09
Payer: MEDICAID

## 2024-05-09 ENCOUNTER — RX RENEWAL (OUTPATIENT)
Age: 54
End: 2024-05-09

## 2024-05-09 ENCOUNTER — NON-APPOINTMENT (OUTPATIENT)
Age: 54
End: 2024-05-09

## 2024-05-09 VITALS
BODY MASS INDEX: 29.84 KG/M2 | HEART RATE: 75 BPM | SYSTOLIC BLOOD PRESSURE: 130 MMHG | DIASTOLIC BLOOD PRESSURE: 80 MMHG | HEIGHT: 75 IN | WEIGHT: 240 LBS | OXYGEN SATURATION: 98 %

## 2024-05-09 DIAGNOSIS — I25.10 ATHEROSCLEROTIC HEART DISEASE OF NATIVE CORONARY ARTERY W/OUT ANGINA PECTORIS: ICD-10-CM

## 2024-05-09 DIAGNOSIS — I10 ESSENTIAL (PRIMARY) HYPERTENSION: ICD-10-CM

## 2024-05-09 PROCEDURE — 93000 ELECTROCARDIOGRAM COMPLETE: CPT

## 2024-05-09 PROCEDURE — 99215 OFFICE O/P EST HI 40 MIN: CPT | Mod: 25

## 2024-05-09 PROCEDURE — 93306 TTE W/DOPPLER COMPLETE: CPT

## 2024-05-09 PROCEDURE — 90837 PSYTX W PT 60 MINUTES: CPT | Mod: 95

## 2024-05-09 PROCEDURE — G2211 COMPLEX E/M VISIT ADD ON: CPT | Mod: NC,1L

## 2024-05-09 NOTE — PLAN
[FreeTextEntry2] : Pt seeks to decrease anxiety symptoms.  [Cognitive and/or Behavior Therapy] : Cognitive and/or Behavior Therapy  [Psychoeducation] : Psychoeducation  [de-identified] : Psychologist met with Pt using teletherapy platform. Pt was at their home while psychologist was working remotely via [facility]. Pt continued to provide consent for telehealth services.   Affect was observed to be full. Mood was reported as unchanged. Pt was responsive, receptive, and engaged throughout session. Evidence of risk was neither observed nor reported.   Goals of the session were to review tasks completed since the last appointment, review emotions, cognitions, and outcomes associated with recent interpersonal discussions (regarding health status),and discuss Pt concerns regarding confidence. Feared outcomes were discussed, and behavioral targets were discussed for the following week, which will be revisited in the next appointment.   [Return in ____ week(s)] : Return in [unfilled] week(s) [FreeTextEntry1] : Psychoeducation about Diagnosis & Treatment Model  1. Pt will learn about their symptoms and diagnosis and be able to explain it back to psychologist.  2. Pt will learn about the CBT model and be able to accurately categorize their symptoms and diagnosis in terms of cognition, emotional responses, behaviors, and physiological arousal.   Cognitive Therapy Interventions   1. Pt can benefit from learning and practicing cognitive restructuring, including learning to categorize automatic thoughts/cognitive distortions, judgments, biases, and attributions   2. Pt can benefit from using cognitive reframing to challenge cognitive distortions to increase flexibility in thinking   3. Pt can benefit from using self-coping statements to encourage themselves and provide validation   4. Cognitive restructuring will be learned and practiced within session, and assigned for homework between sessions. This will entail interventions such as mood monitoring and thought record forms, to challenge cognitive distortions in real-time.     Relaxation Techniques  1. Pt can benefit from learning and practicing Relaxation Techniques to reduce physiological arousal, including flight/flight/freeze responses, manifested by muscle tightness or tension; emotions including anxiety and panic, sadness and depression, and anger.  2. Relaxation techniques can include any or all of the following: Diaphragmatic Breathing (DB), Progressive Muscle Relaxation (PMR), Imaginal Relaxation (IMR), or mindfulness.  3. Relaxation techniques will be learned and practiced within sessions, and assigned for homework between sessions. This will entail interventions such using monitoring forms to track their stress and other symptoms, and assess the efficacy of their implementation of relaxation techniques.   Exposure Therapy Interventions   1. Pt can benefit from learning/continuing exposure therapy interventions, involving confronting situational, emotional, physiological, and cognitive avoided situations.   2. Pt can benefit from reducing and eliminating safety behaviors/objects, such as maladaptive response/ritualization prevention, removing "just in case" items that maintain anxiety, panic, obsessive, and post-traumatic symptoms.   3. Exposure therapy involves any or all of these modalities: in vivo, imaginal, interoceptive, Virtual Reality, written/narrative, and analog in vivo exposure   4. Exposures will be conducted within psychotherapy sessions and assigned for homework between sessions

## 2024-05-09 NOTE — END OF VISIT
[Duration of Psychotherapy Visit (minutes spent in synchronous communication): ____] : Duration of Psychotherapy Visit (minutes spent in synchronous communication): [unfilled] [Individual Psychotherapy for 53+ minutes] : Individual Psychotherapy for 53+ minutes  [Teletherapy Service Provided] : The services provided in this session were delivered via tele-therapy [FreeTextEntry3] : Home [FreeTextEntry5] : Remote office via [facility]

## 2024-05-16 ENCOUNTER — APPOINTMENT (OUTPATIENT)
Dept: PSYCHIATRY | Facility: CLINIC | Age: 54
End: 2024-05-16
Payer: MEDICAID

## 2024-05-16 PROCEDURE — 90837 PSYTX W PT 60 MINUTES: CPT | Mod: 95

## 2024-05-17 NOTE — REASON FOR VISIT
[Patient preference] : as per patient preference [Continuity of care] : to ensure continuity of care [Continuing, patient not seen in-person within last 12 months (provide details below)] : Telehealth services are continuing, patient not seen in-person within last 12 months.  [Other Location: e.g. Home (Enter Location, City,State)___] : The provider was located at [unfilled]. [Telehealth (audio & video) - Individual/Group] : This visit was provided via telehealth using real-time 2-way audio visual technology. [Home] : The patient, [unfilled], was located at home, [unfilled], at the time of the visit. [Patient] : Patient [FreeTextEntry4] : 5:00PM [FreeTextEntry5] : 6:00PM [FreeTextEntry1] : anxiety

## 2024-05-17 NOTE — PLAN
[Cognitive and/or Behavior Therapy] : Cognitive and/or Behavior Therapy  [Psychoeducation] : Psychoeducation  [Return in ____ week(s)] : Return in [unfilled] week(s) [FreeTextEntry2] : Pt seeks to decrease anxiety symptoms.  [de-identified] : Psychologist met with Pt using teletherapy platform. Pt was at their home while psychologist was working remotely via [facility]. Pt continued to provide consent for telehealth services.   Affect was observed to be full. Mood was reported as unchanged. Pt was responsive, receptive, and engaged throughout session. Evidence of risk was neither observed nor reported.   Goals of the session were to review recent stressors (i.e., loss of family members) the previous week, and identify coping strategies. Social support and self-care behaviors were revisited and planned for the following week. Pt will continue to practice skills for hw.  [FreeTextEntry1] : Psychoeducation about Diagnosis & Treatment Model  1. Pt will learn about their symptoms and diagnosis and be able to explain it back to psychologist.  2. Pt will learn about the CBT model and be able to accurately categorize their symptoms and diagnosis in terms of cognition, emotional responses, behaviors, and physiological arousal.   Cognitive Therapy Interventions   1. Pt can benefit from learning and practicing cognitive restructuring, including learning to categorize automatic thoughts/cognitive distortions, judgments, biases, and attributions   2. Pt can benefit from using cognitive reframing to challenge cognitive distortions to increase flexibility in thinking   3. Pt can benefit from using self-coping statements to encourage themselves and provide validation   4. Cognitive restructuring will be learned and practiced within session, and assigned for homework between sessions. This will entail interventions such as mood monitoring and thought record forms, to challenge cognitive distortions in real-time.     Relaxation Techniques  1. Pt can benefit from learning and practicing Relaxation Techniques to reduce physiological arousal, including flight/flight/freeze responses, manifested by muscle tightness or tension; emotions including anxiety and panic, sadness and depression, and anger.  2. Relaxation techniques can include any or all of the following: Diaphragmatic Breathing (DB), Progressive Muscle Relaxation (PMR), Imaginal Relaxation (IMR), or mindfulness.  3. Relaxation techniques will be learned and practiced within sessions, and assigned for homework between sessions. This will entail interventions such using monitoring forms to track their stress and other symptoms, and assess the efficacy of their implementation of relaxation techniques.   Exposure Therapy Interventions   1. Pt can benefit from learning/continuing exposure therapy interventions, involving confronting situational, emotional, physiological, and cognitive avoided situations.   2. Pt can benefit from reducing and eliminating safety behaviors/objects, such as maladaptive response/ritualization prevention, removing "just in case" items that maintain anxiety, panic, obsessive, and post-traumatic symptoms.   3. Exposure therapy involves any or all of these modalities: in vivo, imaginal, interoceptive, Virtual Reality, written/narrative, and analog in vivo exposure   4. Exposures will be conducted within psychotherapy sessions and assigned for homework between sessions

## 2024-05-21 ENCOUNTER — RX RENEWAL (OUTPATIENT)
Age: 54
End: 2024-05-21

## 2024-05-21 PROBLEM — E74.39 GLUCOSE INTOLERANCE: Status: ACTIVE | Noted: 2022-12-07

## 2024-05-21 PROBLEM — I25.10 CAD (CORONARY ARTERY DISEASE): Status: ACTIVE | Noted: 2021-06-18

## 2024-05-23 ENCOUNTER — APPOINTMENT (OUTPATIENT)
Dept: PSYCHIATRY | Facility: CLINIC | Age: 54
End: 2024-05-23

## 2024-05-28 ENCOUNTER — APPOINTMENT (OUTPATIENT)
Dept: CARDIOLOGY | Facility: CLINIC | Age: 54
End: 2024-05-28
Payer: MEDICAID

## 2024-05-28 DIAGNOSIS — I25.10 ATHEROSCLEROTIC HEART DISEASE OF NATIVE CORONARY ARTERY W/OUT ANGINA PECTORIS: ICD-10-CM

## 2024-05-28 DIAGNOSIS — E74.39 OTHER DISORDERS OF INTESTINAL CARBOHYDRATE ABSORPTION: ICD-10-CM

## 2024-05-28 PROCEDURE — 99213 OFFICE O/P EST LOW 20 MIN: CPT

## 2024-05-28 PROCEDURE — G2211 COMPLEX E/M VISIT ADD ON: CPT | Mod: NC,95

## 2024-05-28 RX ORDER — TIRZEPATIDE 10 MG/.5ML
10 INJECTION, SOLUTION SUBCUTANEOUS
Qty: 1 | Refills: 1 | Status: ACTIVE | COMMUNITY
Start: 2023-10-27 | End: 1900-01-01

## 2024-05-30 ENCOUNTER — APPOINTMENT (OUTPATIENT)
Dept: PSYCHIATRY | Facility: CLINIC | Age: 54
End: 2024-05-30
Payer: MEDICAID

## 2024-05-30 PROCEDURE — 90837 PSYTX W PT 60 MINUTES: CPT | Mod: 95

## 2024-05-30 NOTE — PLAN
[FreeTextEntry2] : Pt seeks to decrease anxiety symptoms.  [Cognitive and/or Behavior Therapy] : Cognitive and/or Behavior Therapy  [Psychoeducation] : Psychoeducation  [de-identified] : Psychologist met with Pt using teletherapy platform. Pt was at their home while psychologist was working remotely via [facility]. Pt continued to provide consent for telehealth services.   Affect was observed to be full. Mood was reported as unchanged. Pt was responsive, receptive, and engaged throughout session. Evidence of risk was neither observed nor reported.   Goals of the session were to review recent stressors and Pt coping. Cognitions and emotions associated with self-confidence were identified, and Psychologist provided psychoeducation regarding assertiveness skills. Tasks completed/behavioral goals met since the previous appointment were discussed, which Psychologist validated and reinforced.  Goals for the following week were identified, which Pt will complete for hw. The utility of social support and self-care behaviors were also reiterated and will be revisited in the following session. Assertiveness concepts and exercises will also be addressed further in the next appointment. [Return in ____ week(s)] : Return in [unfilled] week(s) [FreeTextEntry1] : Psychoeducation about Diagnosis & Treatment Model  1. Pt will learn about their symptoms and diagnosis and be able to explain it back to psychologist.  2. Pt will learn about the CBT model and be able to accurately categorize their symptoms and diagnosis in terms of cognition, emotional responses, behaviors, and physiological arousal.   Cognitive Therapy Interventions   1. Pt can benefit from learning and practicing cognitive restructuring, including learning to categorize automatic thoughts/cognitive distortions, judgments, biases, and attributions   2. Pt can benefit from using cognitive reframing to challenge cognitive distortions to increase flexibility in thinking   3. Pt can benefit from using self-coping statements to encourage themselves and provide validation   4. Cognitive restructuring will be learned and practiced within session, and assigned for homework between sessions. This will entail interventions such as mood monitoring and thought record forms, to challenge cognitive distortions in real-time.     Relaxation Techniques  1. Pt can benefit from learning and practicing Relaxation Techniques to reduce physiological arousal, including flight/flight/freeze responses, manifested by muscle tightness or tension; emotions including anxiety and panic, sadness and depression, and anger.  2. Relaxation techniques can include any or all of the following: Diaphragmatic Breathing (DB), Progressive Muscle Relaxation (PMR), Imaginal Relaxation (IMR), or mindfulness.  3. Relaxation techniques will be learned and practiced within sessions, and assigned for homework between sessions. This will entail interventions such using monitoring forms to track their stress and other symptoms, and assess the efficacy of their implementation of relaxation techniques.   Exposure Therapy Interventions   1. Pt can benefit from learning/continuing exposure therapy interventions, involving confronting situational, emotional, physiological, and cognitive avoided situations.   2. Pt can benefit from reducing and eliminating safety behaviors/objects, such as maladaptive response/ritualization prevention, removing "just in case" items that maintain anxiety, panic, obsessive, and post-traumatic symptoms.   3. Exposure therapy involves any or all of these modalities: in vivo, imaginal, interoceptive, Virtual Reality, written/narrative, and analog in vivo exposure   4. Exposures will be conducted within psychotherapy sessions and assigned for homework between sessions

## 2024-05-30 NOTE — END OF VISIT
[Duration of Psychotherapy Visit (minutes spent in synchronous communication): ____] : Duration of Psychotherapy Visit (minutes spent in synchronous communication): [unfilled] [Individual Psychotherapy for 53+ minutes] : Individual Psychotherapy for 53+ minutes  [Teletherapy Service Provided] : The services provided in this session were delivered via tele-therapy [FreeTextEntry3] : Home [FreeTextEntry5] : Remote office via [facility]

## 2024-06-06 ENCOUNTER — APPOINTMENT (OUTPATIENT)
Dept: PSYCHIATRY | Facility: CLINIC | Age: 54
End: 2024-06-06

## 2024-06-13 ENCOUNTER — APPOINTMENT (OUTPATIENT)
Dept: PSYCHIATRY | Facility: CLINIC | Age: 54
End: 2024-06-13
Payer: MEDICAID

## 2024-06-13 PROCEDURE — 90837 PSYTX W PT 60 MINUTES: CPT | Mod: 95

## 2024-06-14 NOTE — END OF VISIT
[Duration of Psychotherapy Visit (minutes spent in synchronous communication): ____] : Duration of Psychotherapy Visit (minutes spent in synchronous communication): [unfilled] [Individual Psychotherapy for 53+ minutes] : Individual Psychotherapy for 53+ minutes  [Teletherapy Service Provided] : The services provided in this session were delivered via tele-therapy [FreeTextEntry3] : Home [FreeTextEntry5] : Remote office via [facility]

## 2024-06-14 NOTE — PLAN
[FreeTextEntry2] : Pt seeks to decrease anxiety symptoms.  [Cognitive and/or Behavior Therapy] : Cognitive and/or Behavior Therapy  [Psychoeducation] : Psychoeducation  [de-identified] : Psychologist met with Pt using teletherapy platform. Pt was at their home while psychologist was working remotely via [facility]. Pt continued to provide consent for telehealth services.   Affect was observed to be full. Mood was reported as unchanged. Pt was responsive, receptive, and engaged throughout session. Evidence of risk was neither observed nor reported.   Goals of the session were to review/discuss emotions, cognitions, and Pt's experience regarding recent stressors. Pt reported a friend encountered health concerns, which led Pt to experience distress. Pt reported friend is currently safe and well. Coping strategies were revisited and self-care and social support were emphasized. Pt will practice coping skills over the following week.  [Return in ____ week(s)] : Return in [unfilled] week(s) [FreeTextEntry1] : Psychoeducation about Diagnosis & Treatment Model  1. Pt will learn about their symptoms and diagnosis and be able to explain it back to psychologist.  2. Pt will learn about the CBT model and be able to accurately categorize their symptoms and diagnosis in terms of cognition, emotional responses, behaviors, and physiological arousal.   Cognitive Therapy Interventions   1. Pt can benefit from learning and practicing cognitive restructuring, including learning to categorize automatic thoughts/cognitive distortions, judgments, biases, and attributions   2. Pt can benefit from using cognitive reframing to challenge cognitive distortions to increase flexibility in thinking   3. Pt can benefit from using self-coping statements to encourage themselves and provide validation   4. Cognitive restructuring will be learned and practiced within session, and assigned for homework between sessions. This will entail interventions such as mood monitoring and thought record forms, to challenge cognitive distortions in real-time.     Relaxation Techniques  1. Pt can benefit from learning and practicing Relaxation Techniques to reduce physiological arousal, including flight/flight/freeze responses, manifested by muscle tightness or tension; emotions including anxiety and panic, sadness and depression, and anger.  2. Relaxation techniques can include any or all of the following: Diaphragmatic Breathing (DB), Progressive Muscle Relaxation (PMR), Imaginal Relaxation (IMR), or mindfulness.  3. Relaxation techniques will be learned and practiced within sessions, and assigned for homework between sessions. This will entail interventions such using monitoring forms to track their stress and other symptoms, and assess the efficacy of their implementation of relaxation techniques.   Exposure Therapy Interventions   1. Pt can benefit from learning/continuing exposure therapy interventions, involving confronting situational, emotional, physiological, and cognitive avoided situations.   2. Pt can benefit from reducing and eliminating safety behaviors/objects, such as maladaptive response/ritualization prevention, removing "just in case" items that maintain anxiety, panic, obsessive, and post-traumatic symptoms.   3. Exposure therapy involves any or all of these modalities: in vivo, imaginal, interoceptive, Virtual Reality, written/narrative, and analog in vivo exposure   4. Exposures will be conducted within psychotherapy sessions and assigned for homework between sessions

## 2024-06-17 ENCOUNTER — APPOINTMENT (OUTPATIENT)
Dept: ORTHOPEDIC SURGERY | Facility: CLINIC | Age: 54
End: 2024-06-17
Payer: MEDICAID

## 2024-06-17 ENCOUNTER — APPOINTMENT (OUTPATIENT)
Dept: INFECTIOUS DISEASE | Facility: CLINIC | Age: 54
End: 2024-06-17
Payer: MEDICAID

## 2024-06-17 VITALS
SYSTOLIC BLOOD PRESSURE: 142 MMHG | DIASTOLIC BLOOD PRESSURE: 65 MMHG | WEIGHT: 240 LBS | TEMPERATURE: 97.8 F | BODY MASS INDEX: 29.84 KG/M2 | OXYGEN SATURATION: 97 % | HEIGHT: 75 IN | HEART RATE: 78 BPM

## 2024-06-17 VITALS — HEIGHT: 75 IN | BODY MASS INDEX: 29.84 KG/M2 | WEIGHT: 240 LBS

## 2024-06-17 DIAGNOSIS — B20 HUMAN IMMUNODEFICIENCY VIRUS [HIV] DISEASE: ICD-10-CM

## 2024-06-17 DIAGNOSIS — Z92.89 PERSONAL HISTORY OF OTHER MEDICAL TREATMENT: ICD-10-CM

## 2024-06-17 PROCEDURE — 99214 OFFICE O/P EST MOD 30 MIN: CPT | Mod: 25

## 2024-06-17 PROCEDURE — 99214 OFFICE O/P EST MOD 30 MIN: CPT

## 2024-06-17 PROCEDURE — 72170 X-RAY EXAM OF PELVIS: CPT

## 2024-06-17 RX ORDER — ELVITEGRAVIR, COBICISTAT, EMTRICITABINE, AND TENOFOVIR ALAFENAMIDE 150; 150; 200; 10 MG/1; MG/1; MG/1; MG/1
150-150-200-10 TABLET ORAL
Qty: 30 | Refills: 5 | Status: ACTIVE | COMMUNITY
Start: 2017-01-13 | End: 1900-01-01

## 2024-06-17 RX ORDER — DICLOFENAC SODIUM 75 MG/1
75 TABLET, DELAYED RELEASE ORAL
Qty: 60 | Refills: 1 | Status: ACTIVE | COMMUNITY
Start: 2024-06-17 | End: 1900-01-01

## 2024-06-17 NOTE — PHYSICAL EXAM
[Normal] : Gait: normal [Lee's Sign] : negative Lee's sign [Pronator Drift] : negative pronator drift [SLR] : negative straight leg raise [de-identified] : 5 out of 5 motor strength, sensation is intact and symmetrical full range of motion flexion extension and rotation, no palpatory tenderness full range of motion of hips knees shoulders and elbows (all four extremities), no atrophy, negative straight leg raise, no pathological reflexes, no swelling, normal ambulation, no apparent distress skin is intact, no palpable lymph nodes, no upper or lower extremity instability, alert and oriented x3 and normal mood. Normal finger-to nose test.  [de-identified] :   XR AP Pelvis 06/17/2024 -Bilateral hip arthritis - reviewed with the patient.   XR AP Lat Thoracic 07/24/2023 -thoracic degenerative disc disease- reviewed with patient.   XR AP Lat Lumbar 07/24/2023 -left hip arthritis, lumbar disc degenerative disease- reviewed with patient.

## 2024-06-17 NOTE — HISTORY OF PRESENT ILLNESS
[de-identified] : 53 year old male with chronic lower back pain presents for evaluation of presents for increased lower back pain x 2-3 months.  Last seen in July 2023 thoracic spine pain and right SI joint pain, was given a right SI joint injection at the time which did not help. He has been having increased tightness of the lower back and lateral hips where it is difficult to cross his legs. Denies radiation of pain down the legs. Denies numbness/tingling. Bending also aggravates the pain. Takes turmeric. Is currently participating with pelvic floor therapy s/p prostatectomy in March.  Denies JESSICA.  PMHx: diagnosed with prostate cancer in January, s/p prostatectomy in March. HIV, HTN No fever chills sweats nausea vomiting no bowel or bladder dysfunction, no recent weight loss or gain no night pain. This history is in addition to the intake form that I personally reviewed.  [Stable] : stable

## 2024-06-17 NOTE — ASSESSMENT
[FreeTextEntry1] : 6/17/2024--plan :--chronic stable HIV, chronic stable rectal cancer and recent prostate cancer. 1) continue Mari [phone] oral tablet daily ( meds reviewed and renewed) and all meds 2) all labs reviewed today and new labs ordered including, CBC, BMP, urine, viral load, CD4, tsh 3) see in 6 months 4) reviewed external notes from cardiology and urology

## 2024-06-17 NOTE — ADDENDUM
[FreeTextEntry1] : This note was written by Raymundo Valdovinos on 06/17/2024 acting as scribe for Dr. Gutierrez Ramirez M.D.  I, Gutierrez Ramirez MD, have read and attest that all the information, medical decision making and discharge instructions within are true and accurate.

## 2024-06-17 NOTE — HISTORY OF PRESENT ILLNESS
[FreeTextEntry1] : Reason For Visit--chronic stable HIV, chronic stable rectal cancer and recent prostate cancer. 6/17/2024--VENU SANDOVAL is a pleasant 53 year old male being seen for a follow-up visit. Here for 6 month visit. Diagnosed with AIDS in 2008. Now Cd4 over 500. Undetectable VL. Has PCP Dr. Urbano Jacobs. Followed by Dr. 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 He is doing well. recently went to dental 2020 and ophthalmology. went to ENT and pulmonology and [facility] for lipids and HTN. updated meds list 5/7/2021---Doing well on Mari, Amlodipine 5mg Pt also had recent stress test. Pt to continue the Lisinopril-Hydrochlorothiazide 20-12.5 MG Oral Tablet and Vit D3 as well. rosuvastatin 10 mg from Dr. Jordy Peraza and famotidine 40mg daily . No family history of Colon Ca. Seen by Dr. Craig and doing well. Had the colonoscopy, all well, has follow up. had Pfizer covid vaccine. Pt states familial history of Multiple myeloma. Sexual History: The patient is sexually active.  6/17/2024--plan :--chronic stable HIV, chronic stable rectal cancer and recent prostate cancer. 1) continue Mari [phone] oral tablet daily ( meds reviewed and renewed) and all meds 2) all labs reviewed today and new labs ordered including, CBC, BMP, urine, viral load, CD4, tsh 3) see in 6 months 4) reviewed external notes from cardiology and urology   Active Problems  Abnormal stress test (794.39) (R94.39)  Acid reflux (530.81) (K21.9)  AIDS (042) (B20)  Allergic rhinitis (477.9) (J30.9)  Anemia (285.9) (D64.9)  Anxiety (300.00) (F41.9)  Cardiac arrhythmia (427.9) (I49.9)  Creatinine elevation (790.99) (R79.89)  Decreased libido (799.81) (R68.82)  Degenerative joint disease of both hips (715.95) (M16.0)  Deviated septum (470) (J34.2)  Educated about COVID-19 virus infection (V65.49) (Z71.89)  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)  Hyperlipidemia, mild (272.4) (E78.5)  Hypertension (401.9) (I10)  Insomnia (780.52) (G47.00)  Intermittent memory loss (780.93) (R41.3)  Low testosterone (790.99) (R79.89)  Need for Menactra vaccination (V03.89) (Z23)  Neuropathy involving both lower extremities (356.9) (G57.93)  CLARI (obstructive sleep apnea) (327.23) (G47.33)  Osteoarthritis of both hips (715.95) (M16.0)  Preop testing (V72.84) (Z01.818)  Preop testing (V72.84) (Z01.818)  Pre-procedural laboratory examination (V72.63) (Z01.812)  Prostate cancer screening (V76.44) (Z12.5)  Rash (782.1) (R21)  Scoliosis (737.30) (M41.9)  Shortness of breath on exertion (786.05) (R06.02)  Sickle cell trait (282.5) (D57.3)  Skin rash (782.1) (R21)  Sleep apnea (780.57) (G47.30)  Vitamin D deficiency (268.9) (E55.9)    Past Medical History  History of blood transfusion (V15.89) (Z92.89)  History of candidiasis of mouth (V12.09) (Z86.19)  History of complete eye exam (V15.89) (Z92.89)  History of dental examination (V15.89) (Z92.89)  History of gonorrhea (V12.09) (Z86.19)  History of hemorrhoids (V13.89) (Z87.19)  History of pneumocystis pneumonia (V12.61) (Z87.01)  History of Hypophosphatemia (275.3) (E83.39)  History of Perirectal ulcer (569.41) (K62.6)  History of Walking pneumonia (486) (J18.9)    Current Meds  amLODIPine Besylate 5 MG Oral Tablet; TAKE ONE TABLET BY MOUTH DAILY  Azelastine HCl - 0.1 % Nasal Solution; INHALE 2 SQUIRT Twice daily  Diclofenac Sodium 75 MG Oral Tablet Delayed Release; Take 1 tablet twice daily  Famotidine 40 MG Oral Tablet; TAKE 1 TABLET AT BEDTIME  Genvoya [phone] MG Oral Tablet; TAKE 1 TABLET BY MOUTH DAILY  Lisinopril-hydroCHLOROthiazide 20-12.5 MG Oral Tablet; TAKE ONE TABLET BY MOUTH  DAILY  Rosuvastatin Calcium 10 MG Oral Tablet; TAKE 1 TABLET DAILY  Vitamin D (Ergocalciferol) 1.25 MG ( UT) Oral Capsule; TAKE 1 CAPSULE BY  MOUTH EVERY 2 WEEKS    Allergies  No Known Drug Allergies  Animal dander - Cats  Mold

## 2024-06-17 NOTE — DISCUSSION/SUMMARY
[de-identified] : Lumbar disc degenerative disease Thoracic degenerative disc disease. Bilateral hip arthritis. Patient is presently being treated for prostate cancer. Discussed all options. Diclofenac PRN  Lumbar MRI  F/U after MRI  All options discussed including rest, medicine, home exercise, acupuncture, Chiropractic care, Physical Therapy, Pain management, and last resort surgery. All questions were answered, all alternatives discussed and the patient is in complete agreement with the treatment plan which the patient contributed to and discussed with me through the shared decision making process. Follow-up appointment as instructed. Any issues and the patient will call or come in sooner.

## 2024-06-19 LAB
25(OH)D3 SERPL-MCNC: 49.8 NG/ML
ALBUMIN SERPL ELPH-MCNC: 4.8 G/DL
ALP BLD-CCNC: 75 U/L
ALT SERPL-CCNC: 19 U/L
ANION GAP SERPL CALC-SCNC: 12 MMOL/L
APPEARANCE: CLEAR
AST SERPL-CCNC: 19 U/L
BACTERIA: ABNORMAL /HPF
BILIRUB SERPL-MCNC: 0.3 MG/DL
BILIRUBIN URINE: NEGATIVE
BLOOD URINE: NEGATIVE
BUN SERPL-MCNC: 7 MG/DL
C TRACH RRNA SPEC QL NAA+PROBE: NOT DETECTED
CALCIUM SERPL-MCNC: 9.6 MG/DL
CAST: 0 /LPF
CD3 CELLS # BLD: 852 CELLS/UL
CD3 CELLS NFR BLD: 61 %
CD3+CD4+ CELLS # BLD: 253 CELLS/UL
CD3+CD4+ CELLS NFR BLD: 18 %
CD3+CD4+ CELLS/CD3+CD8+ CLL SPEC: 0.46 RATIO
CD3+CD8+ CELLS # SPEC: 555 CELLS/UL
CD3+CD8+ CELLS NFR BLD: 40 %
CHLORIDE SERPL-SCNC: 107 MMOL/L
CHOLEST SERPL-MCNC: 248 MG/DL
CO2 SERPL-SCNC: 24 MMOL/L
COLOR: YELLOW
CREAT SERPL-MCNC: 1.37 MG/DL
EGFR: 62 ML/MIN/1.73M2
EPITHELIAL CELLS: 1 /HPF
ESTIMATED AVERAGE GLUCOSE: 111 MG/DL
GLUCOSE QUALITATIVE U: >=1000 MG/DL
GLUCOSE SERPL-MCNC: 93 MG/DL
HBA1C MFR BLD HPLC: 5.5 %
HCT VFR BLD CALC: 39.1 %
HDLC SERPL-MCNC: 34 MG/DL
HGB BLD-MCNC: 12.9 G/DL
HIV1 RNA # SERPL NAA+PROBE: NORMAL
HIV1 RNA # SERPL NAA+PROBE: NORMAL COPIES/ML
KETONES URINE: NEGATIVE MG/DL
LDLC SERPL CALC-MCNC: 134 MG/DL
LEUKOCYTE ESTERASE URINE: NEGATIVE
MCHC RBC-ENTMCNC: 28.2 PG
MCHC RBC-ENTMCNC: 33 GM/DL
MCV RBC AUTO: 85.4 FL
MICROSCOPIC-UA: NORMAL
N GONORRHOEA RRNA SPEC QL NAA+PROBE: NOT DETECTED
NITRITE URINE: NEGATIVE
NONHDLC SERPL-MCNC: 214 MG/DL
PH URINE: 6
PLATELET # BLD AUTO: 219 K/UL
POTASSIUM SERPL-SCNC: 3.7 MMOL/L
PROT SERPL-MCNC: 7.6 G/DL
PROTEIN URINE: NORMAL MG/DL
PSA SERPL-MCNC: <0.01 NG/ML
RBC # BLD: 4.58 M/UL
RBC # FLD: 13.7 %
RED BLOOD CELLS URINE: 1 /HPF
SODIUM SERPL-SCNC: 142 MMOL/L
SOURCE AMPLIFICATION: NORMAL
SPECIFIC GRAVITY URINE: 1.02
T PALLIDUM AB SER QL IA: NEGATIVE
TRIGL SERPL-MCNC: 436 MG/DL
TSH SERPL-ACNC: 1.29 UIU/ML
UROBILINOGEN URINE: 0.2 MG/DL
VIRAL LOAD INTERP: NORMAL
VIRAL LOAD LOG: NORMAL LG COP/ML
WBC # FLD AUTO: 3.2 K/UL
WHITE BLOOD CELLS URINE: 1 /HPF

## 2024-06-19 RX ORDER — ROSUVASTATIN CALCIUM 10 MG/1
10 TABLET, FILM COATED ORAL
Qty: 90 | Refills: 1 | Status: ACTIVE | COMMUNITY
Start: 2020-08-05 | End: 1900-01-01

## 2024-06-20 ENCOUNTER — APPOINTMENT (OUTPATIENT)
Dept: PSYCHIATRY | Facility: CLINIC | Age: 54
End: 2024-06-20
Payer: MEDICAID

## 2024-06-20 PROCEDURE — 90837 PSYTX W PT 60 MINUTES: CPT | Mod: 95

## 2024-06-21 ENCOUNTER — RX RENEWAL (OUTPATIENT)
Age: 54
End: 2024-06-21

## 2024-06-21 RX ORDER — LISINOPRIL 20 MG/1
20 TABLET ORAL
Qty: 90 | Refills: 0 | Status: ACTIVE | COMMUNITY
Start: 2023-12-27 | End: 1900-01-01

## 2024-06-21 NOTE — PLAN
[FreeTextEntry2] : Pt seeks to decrease anxiety symptoms.  [Cognitive and/or Behavior Therapy] : Cognitive and/or Behavior Therapy  [Psychoeducation] : Psychoeducation  [de-identified] : Psychologist met with Pt using teletherapy platform. Pt was at their home while psychologist was working remotely via [facility]. Pt continued to provide consent for telehealth services.   Affect was observed to be full. Mood was reported as unchanged. Pt was responsive, receptive, and engaged throughout session. Evidence of risk was neither observed nor reported.   Goals of the session were to review/discuss emotions, cognitions, and Pt's experience regarding recent stressors, provide psychoeducation and practice TRAP and TRAC using a worksheet during session, and plan activities of enjoyment/value for the following week, which Pt will complete for hw.  [Return in ____ week(s)] : Return in [unfilled] week(s) [FreeTextEntry1] : Psychoeducation about Diagnosis & Treatment Model  1. Pt will learn about their symptoms and diagnosis and be able to explain it back to psychologist.  2. Pt will learn about the CBT model and be able to accurately categorize their symptoms and diagnosis in terms of cognition, emotional responses, behaviors, and physiological arousal.   Cognitive Therapy Interventions   1. Pt can benefit from learning and practicing cognitive restructuring, including learning to categorize automatic thoughts/cognitive distortions, judgments, biases, and attributions   2. Pt can benefit from using cognitive reframing to challenge cognitive distortions to increase flexibility in thinking   3. Pt can benefit from using self-coping statements to encourage themselves and provide validation   4. Cognitive restructuring will be learned and practiced within session, and assigned for homework between sessions. This will entail interventions such as mood monitoring and thought record forms, to challenge cognitive distortions in real-time.     Relaxation Techniques  1. Pt can benefit from learning and practicing Relaxation Techniques to reduce physiological arousal, including flight/flight/freeze responses, manifested by muscle tightness or tension; emotions including anxiety and panic, sadness and depression, and anger.  2. Relaxation techniques can include any or all of the following: Diaphragmatic Breathing (DB), Progressive Muscle Relaxation (PMR), Imaginal Relaxation (IMR), or mindfulness.  3. Relaxation techniques will be learned and practiced within sessions, and assigned for homework between sessions. This will entail interventions such using monitoring forms to track their stress and other symptoms, and assess the efficacy of their implementation of relaxation techniques.   Exposure Therapy Interventions   1. Pt can benefit from learning/continuing exposure therapy interventions, involving confronting situational, emotional, physiological, and cognitive avoided situations.   2. Pt can benefit from reducing and eliminating safety behaviors/objects, such as maladaptive response/ritualization prevention, removing "just in case" items that maintain anxiety, panic, obsessive, and post-traumatic symptoms.   3. Exposure therapy involves any or all of these modalities: in vivo, imaginal, interoceptive, Virtual Reality, written/narrative, and analog in vivo exposure   4. Exposures will be conducted within psychotherapy sessions and assigned for homework between sessions

## 2024-06-21 NOTE — END OF VISIT
[Duration of Psychotherapy Visit (minutes spent in synchronous communication): ____] : Duration of Psychotherapy Visit (minutes spent in synchronous communication): [unfilled] [Individual Psychotherapy for 53+ minutes] : Individual Psychotherapy for 53+ minutes  [Teletherapy Service Provided] : The services provided in this session were delivered via tele-therapy [FreeTextEntry3] : Home [FreeTextEntry5] : Remote office via [facility]

## 2024-06-26 ENCOUNTER — OUTPATIENT (OUTPATIENT)
Dept: OUTPATIENT SERVICES | Facility: HOSPITAL | Age: 54
LOS: 1 days | End: 2024-06-26
Payer: MEDICAID

## 2024-06-26 ENCOUNTER — APPOINTMENT (OUTPATIENT)
Dept: CARDIOLOGY | Facility: CLINIC | Age: 54
End: 2024-06-26

## 2024-06-26 ENCOUNTER — APPOINTMENT (OUTPATIENT)
Dept: MRI IMAGING | Facility: CLINIC | Age: 54
End: 2024-06-26
Payer: MEDICAID

## 2024-06-26 DIAGNOSIS — G47.33 OBSTRUCTIVE SLEEP APNEA (ADULT) (PEDIATRIC): Chronic | ICD-10-CM

## 2024-06-26 DIAGNOSIS — M20.41 OTHER HAMMER TOE(S) (ACQUIRED), RIGHT FOOT: Chronic | ICD-10-CM

## 2024-06-26 DIAGNOSIS — M54.9 DORSALGIA, UNSPECIFIED: ICD-10-CM

## 2024-06-26 PROCEDURE — 72148 MRI LUMBAR SPINE W/O DYE: CPT | Mod: 26

## 2024-06-26 PROCEDURE — 72148 MRI LUMBAR SPINE W/O DYE: CPT

## 2024-06-27 ENCOUNTER — APPOINTMENT (OUTPATIENT)
Dept: PSYCHIATRY | Facility: CLINIC | Age: 54
End: 2024-06-27
Payer: MEDICAID

## 2024-06-27 PROCEDURE — 90837 PSYTX W PT 60 MINUTES: CPT | Mod: 95

## 2024-06-30 ENCOUNTER — NON-APPOINTMENT (OUTPATIENT)
Age: 54
End: 2024-06-30

## 2024-07-01 ENCOUNTER — APPOINTMENT (OUTPATIENT)
Dept: ORTHOPEDIC SURGERY | Facility: CLINIC | Age: 54
End: 2024-07-01
Payer: MEDICAID

## 2024-07-01 VITALS — BODY MASS INDEX: 29.84 KG/M2 | HEIGHT: 75 IN | WEIGHT: 240 LBS

## 2024-07-01 DIAGNOSIS — M51.36 OTHER INTERVERTEBRAL DISC DEGENERATION, LUMBAR REGION: ICD-10-CM

## 2024-07-01 PROCEDURE — 99214 OFFICE O/P EST MOD 30 MIN: CPT

## 2024-07-11 ENCOUNTER — RX RENEWAL (OUTPATIENT)
Age: 54
End: 2024-07-11

## 2024-07-11 ENCOUNTER — APPOINTMENT (OUTPATIENT)
Dept: PSYCHIATRY | Facility: CLINIC | Age: 54
End: 2024-07-11
Payer: MEDICAID

## 2024-07-11 PROCEDURE — 90837 PSYTX W PT 60 MINUTES: CPT | Mod: 95

## 2024-07-18 ENCOUNTER — APPOINTMENT (OUTPATIENT)
Dept: PSYCHIATRY | Facility: CLINIC | Age: 54
End: 2024-07-18
Payer: MEDICAID

## 2024-07-18 PROCEDURE — 90837 PSYTX W PT 60 MINUTES: CPT | Mod: 95

## 2024-07-24 ENCOUNTER — APPOINTMENT (OUTPATIENT)
Dept: CARDIOLOGY | Facility: CLINIC | Age: 54
End: 2024-07-24
Payer: MEDICAID

## 2024-07-24 VITALS — HEIGHT: 75 IN | WEIGHT: 232 LBS | BODY MASS INDEX: 28.85 KG/M2

## 2024-07-24 DIAGNOSIS — I25.10 ATHEROSCLEROTIC HEART DISEASE OF NATIVE CORONARY ARTERY W/OUT ANGINA PECTORIS: ICD-10-CM

## 2024-07-24 DIAGNOSIS — E74.39 OTHER DISORDERS OF INTESTINAL CARBOHYDRATE ABSORPTION: ICD-10-CM

## 2024-07-24 PROCEDURE — G2211 COMPLEX E/M VISIT ADD ON: CPT | Mod: NC,95

## 2024-07-24 PROCEDURE — 99214 OFFICE O/P EST MOD 30 MIN: CPT | Mod: 95

## 2024-07-25 ENCOUNTER — APPOINTMENT (OUTPATIENT)
Dept: PSYCHIATRY | Facility: CLINIC | Age: 54
End: 2024-07-25
Payer: MEDICAID

## 2024-07-25 PROCEDURE — 90837 PSYTX W PT 60 MINUTES: CPT | Mod: 95

## 2024-07-26 ENCOUNTER — RX RENEWAL (OUTPATIENT)
Age: 54
End: 2024-07-26

## 2024-08-01 ENCOUNTER — APPOINTMENT (OUTPATIENT)
Dept: PSYCHIATRY | Facility: CLINIC | Age: 54
End: 2024-08-01
Payer: MEDICAID

## 2024-08-01 PROCEDURE — 90837 PSYTX W PT 60 MINUTES: CPT | Mod: 95

## 2024-08-01 NOTE — PLAN
[FreeTextEntry2] : Pt seeks to decrease anxiety symptoms.  [Cognitive and/or Behavior Therapy] : Cognitive and/or Behavior Therapy  [Psychoeducation] : Psychoeducation  [de-identified] : Psychologist met with Pt using teletherapy platform. Pt was at their home while psychologist was working remotely via [facility]. Pt continued to provide consent for telehealth services.   Affect was observed to be full. Mood was reported as unchanged. Pt was responsive, receptive, and engaged throughout session. Evidence of risk was neither observed nor reported.   Goals of the session were to review behavioral goals met the previous week, identify thoughts and emotions associated with recent increase in self-confidence, develop a coping response script regarding what was helpful, and review goals for the following weeks, which will be revisited in the next appointment. [Return in ____ week(s)] : Return in [unfilled] week(s) [FreeTextEntry1] : Psychoeducation about Diagnosis & Treatment Model  1. Pt will learn about their symptoms and diagnosis and be able to explain it back to psychologist.  2. Pt will learn about the CBT model and be able to accurately categorize their symptoms and diagnosis in terms of cognition, emotional responses, behaviors, and physiological arousal.   Cognitive Therapy Interventions   1. Pt can benefit from learning and practicing cognitive restructuring, including learning to categorize automatic thoughts/cognitive distortions, judgments, biases, and attributions   2. Pt can benefit from using cognitive reframing to challenge cognitive distortions to increase flexibility in thinking   3. Pt can benefit from using self-coping statements to encourage themselves and provide validation   4. Cognitive restructuring will be learned and practiced within session, and assigned for homework between sessions. This will entail interventions such as mood monitoring and thought record forms, to challenge cognitive distortions in real-time.     Relaxation Techniques  1. Pt can benefit from learning and practicing Relaxation Techniques to reduce physiological arousal, including flight/flight/freeze responses, manifested by muscle tightness or tension; emotions including anxiety and panic, sadness and depression, and anger.  2. Relaxation techniques can include any or all of the following: Diaphragmatic Breathing (DB), Progressive Muscle Relaxation (PMR), Imaginal Relaxation (IMR), or mindfulness.  3. Relaxation techniques will be learned and practiced within sessions, and assigned for homework between sessions. This will entail interventions such using monitoring forms to track their stress and other symptoms, and assess the efficacy of their implementation of relaxation techniques.   Exposure Therapy Interventions   1. Pt can benefit from learning/continuing exposure therapy interventions, involving confronting situational, emotional, physiological, and cognitive avoided situations.   2. Pt can benefit from reducing and eliminating safety behaviors/objects, such as maladaptive response/ritualization prevention, removing "just in case" items that maintain anxiety, panic, obsessive, and post-traumatic symptoms.   3. Exposure therapy involves any or all of these modalities: in vivo, imaginal, interoceptive, Virtual Reality, written/narrative, and analog in vivo exposure   4. Exposures will be conducted within psychotherapy sessions and assigned for homework between sessions

## 2024-08-05 ENCOUNTER — APPOINTMENT (OUTPATIENT)
Dept: PSYCHIATRY | Facility: CLINIC | Age: 54
End: 2024-08-05

## 2024-08-05 PROCEDURE — 90837 PSYTX W PT 60 MINUTES: CPT | Mod: 95

## 2024-08-05 NOTE — END OF VISIT
[Duration of Psychotherapy Visit (minutes spent in synchronous communication): ____] : Duration of Psychotherapy Visit (minutes spent in synchronous communication): [unfilled] [Individual Psychotherapy for 53+ minutes] : Individual Psychotherapy for 53+ minutes  [Teletherapy Service Provided] : The services provided in this session were delivered via tele-therapy [FreeTextEntry3] : Home [FreeTextEntry5] : Remote office via [facility]

## 2024-08-05 NOTE — PLAN
[FreeTextEntry2] : Pt seeks to decrease anxiety symptoms.  [Cognitive and/or Behavior Therapy] : Cognitive and/or Behavior Therapy  [Psychoeducation] : Psychoeducation  [de-identified] : Psychologist met with Pt using teletherapy platform. Pt was at their home while psychologist was working remotely via [facility]. Pt continued to provide consent for telehealth services.   Affect was observed to be full. Mood was reported as unchanged. Pt was responsive, receptive, and engaged throughout session. Evidence of risk was neither observed nor reported.   Goals of the session were to identify thought chains and Pt responses to thoughts associated with health-related concerns and upcoming follow-up appointments, and discuss coping and targets for the following week, which will be revisited in the next appointment. [Return in ____ week(s)] : Return in [unfilled] week(s) [FreeTextEntry1] : Psychoeducation about Diagnosis & Treatment Model  1. Pt will learn about their symptoms and diagnosis and be able to explain it back to psychologist.  2. Pt will learn about the CBT model and be able to accurately categorize their symptoms and diagnosis in terms of cognition, emotional responses, behaviors, and physiological arousal.   Cognitive Therapy Interventions   1. Pt can benefit from learning and practicing cognitive restructuring, including learning to categorize automatic thoughts/cognitive distortions, judgments, biases, and attributions   2. Pt can benefit from using cognitive reframing to challenge cognitive distortions to increase flexibility in thinking   3. Pt can benefit from using self-coping statements to encourage themselves and provide validation   4. Cognitive restructuring will be learned and practiced within session, and assigned for homework between sessions. This will entail interventions such as mood monitoring and thought record forms, to challenge cognitive distortions in real-time.     Relaxation Techniques  1. Pt can benefit from learning and practicing Relaxation Techniques to reduce physiological arousal, including flight/flight/freeze responses, manifested by muscle tightness or tension; emotions including anxiety and panic, sadness and depression, and anger.  2. Relaxation techniques can include any or all of the following: Diaphragmatic Breathing (DB), Progressive Muscle Relaxation (PMR), Imaginal Relaxation (IMR), or mindfulness.  3. Relaxation techniques will be learned and practiced within sessions, and assigned for homework between sessions. This will entail interventions such using monitoring forms to track their stress and other symptoms, and assess the efficacy of their implementation of relaxation techniques.   Exposure Therapy Interventions   1. Pt can benefit from learning/continuing exposure therapy interventions, involving confronting situational, emotional, physiological, and cognitive avoided situations.   2. Pt can benefit from reducing and eliminating safety behaviors/objects, such as maladaptive response/ritualization prevention, removing "just in case" items that maintain anxiety, panic, obsessive, and post-traumatic symptoms.   3. Exposure therapy involves any or all of these modalities: in vivo, imaginal, interoceptive, Virtual Reality, written/narrative, and analog in vivo exposure   4. Exposures will be conducted within psychotherapy sessions and assigned for homework between sessions

## 2024-08-13 ENCOUNTER — APPOINTMENT (OUTPATIENT)
Dept: UROLOGY | Facility: CLINIC | Age: 54
End: 2024-08-13
Payer: MEDICAID

## 2024-08-13 DIAGNOSIS — N39.3 STRESS INCONTINENCE (FEMALE) (MALE): ICD-10-CM

## 2024-08-13 DIAGNOSIS — N52.31 ERECTILE DYSFUNCTION FOLLOWING RADICAL PROSTATECTOMY: ICD-10-CM

## 2024-08-13 DIAGNOSIS — C61 MALIGNANT NEOPLASM OF PROSTATE: ICD-10-CM

## 2024-08-13 PROCEDURE — 99214 OFFICE O/P EST MOD 30 MIN: CPT

## 2024-08-13 PROCEDURE — G2211 COMPLEX E/M VISIT ADD ON: CPT | Mod: NC

## 2024-08-13 NOTE — HISTORY OF PRESENT ILLNESS
[FreeTextEntry1] : VENU SANDOVAL returns to the office today. He is a 54 year-old man who underwent a radical prostatectomy in March. Pathology showed [place] 4+3, pT2 pN1 with all negative margins.  He reports feeling well with a full appetite. He had his PSA drawn in June and it was undetectable.   Urinary control: incontinence with coughing and sneezing, recently started pelvic floor PT. No longer using pads/diapers for 1-2 months. Denies hematuria or dysuria.  Some mild frequency/urgency.   Sexual Function: Seeing some mild improvement in erectile function but nothing satisfactory for penetration.  The patient is not overly concerned at this time as he is not with a sexual partner but may wish to be in the future.  He is using tadalafil low-dose intermittently.

## 2024-08-13 NOTE — DISEASE MANAGEMENT
[c] : c [0] : N0 [X] : MX [0-10] : 0 -10 ng/mL [Biopsy with Fusion] : Patient had a biopsy with fusion on [7(3+4)] : Fusion Biopsy [place] Score: 7(3+4) [Biopsy results sent to PCP/Referring Physician] : Biopsy results sent to PCP/Referring Physician [] : Patient had a Prostate MRI [4] : 4 [IIC] : IIC [Radical Prostatectomy] : Radical Prostatectomy [Patient had a radical prostatectomy] : Patient had a radical prostatectomy  [7(4+3)] : Rosa Score 7(4+3) [Negative] : Negative margins [2] : T2 [1] : N1 [BiopsyDate] : 12/23 [MeasuredProstateVolume] : 38 [TotalCores] : 13 [TotalPositiveCores] : 6 [MaxCoreInvolvement] : 80 [RadicalProstatectomyDate] : 03/24 [TotalNumberofnodesresected] : 17 [PositiveNodes] : 2

## 2024-08-13 NOTE — HISTORY OF PRESENT ILLNESS
[FreeTextEntry1] : VENU SANDOVAL returns to the office today. He is a 54 year-old man who underwent a radical prostatectomy in March. Pathology showed [place] 4+3, pT2 pN1 with all negative margins.  He reports feeling well with a full appetite. He had his PSA drawn in June and it was undetectable.   Urinary control: incontinence with coughing and sneezing, recently started pelvic floor PT. No longer using pads/diapers for 1-2 months. Denies hematuria or dysuria.  Some mild frequency/urgency.   Sexual Function: Seeing some mild improvement in erectile function but nothing satisfactory for penetration.  The patient is not overly concerned at this time as he is not with a sexual partner but may wish to be in the future.  He is using tadalafil low-dose intermittently.

## 2024-08-13 NOTE — DISEASE MANAGEMENT
[c] : c [0] : N0 [X] : MX [0-10] : 0 -10 ng/mL [Biopsy with Fusion] : Patient had a biopsy with fusion on [7(3+4)] : Fusion Biopsy [place] Score: 7(3+4) [Biopsy results sent to PCP/Referring Physician] : Biopsy results sent to PCP/Referring Physician [] : Patient had a Prostate MRI [4] : 4 [IIC] : IIC [Radical Prostatectomy] : Radical Prostatectomy [Patient had a radical prostatectomy] : Patient had a radical prostatectomy  [7(4+3)] : Rosa Score 7(4+3) [Negative] : Negative margins [2] : T2 [1] : N1 [BiopsyDate] : 12/23 [MeasuredProstateVolume] : 38 [TotalCores] : 13 [TotalPositiveCores] : 6 [MaxCoreInvolvement] : 80 [RadicalProstatectomyDate] : 03/24 [TotalNumberofnodesresected] : 17 [PositiveNodes] : 2

## 2024-08-15 ENCOUNTER — APPOINTMENT (OUTPATIENT)
Dept: PSYCHIATRY | Facility: CLINIC | Age: 54
End: 2024-08-15
Payer: MEDICAID

## 2024-08-15 PROCEDURE — 90837 PSYTX W PT 60 MINUTES: CPT | Mod: 95

## 2024-08-17 LAB — PSA SERPL-MCNC: <0.01 NG/ML

## 2024-08-19 ENCOUNTER — APPOINTMENT (OUTPATIENT)
Dept: PSYCHIATRY | Facility: CLINIC | Age: 54
End: 2024-08-19
Payer: MEDICAID

## 2024-08-19 PROCEDURE — 90837 PSYTX W PT 60 MINUTES: CPT | Mod: 95

## 2024-08-19 NOTE — PLAN
[FreeTextEntry2] : Pt seeks to decrease anxiety symptoms.  [Cognitive and/or Behavior Therapy] : Cognitive and/or Behavior Therapy  [Psychoeducation] : Psychoeducation  [de-identified] : Psychologist met with Pt using teletherapy platform. Pt was at their home while psychologist was working remotely via [facility]. Pt continued to provide consent for telehealth services.   Affect was observed to be full. Mood was reported as unchanged. Pt was responsive, receptive, and engaged throughout session. Evidence of risk was neither observed nor reported.   Goals of the session were to review social engagements and activities, identify cognitions and steps associated with recent behavioral targets (e.g., voice-over work), formulate coping thoughts/responses, and plan for the following week. Targets will be revisited in the next session. [Return in ____ week(s)] : Return in [unfilled] week(s) [FreeTextEntry1] : Psychoeducation about Diagnosis & Treatment Model  1. Pt will learn about their symptoms and diagnosis and be able to explain it back to psychologist.  2. Pt will learn about the CBT model and be able to accurately categorize their symptoms and diagnosis in terms of cognition, emotional responses, behaviors, and physiological arousal.   Cognitive Therapy Interventions   1. Pt can benefit from learning and practicing cognitive restructuring, including learning to categorize automatic thoughts/cognitive distortions, judgments, biases, and attributions   2. Pt can benefit from using cognitive reframing to challenge cognitive distortions to increase flexibility in thinking   3. Pt can benefit from using self-coping statements to encourage themselves and provide validation   4. Cognitive restructuring will be learned and practiced within session, and assigned for homework between sessions. This will entail interventions such as mood monitoring and thought record forms, to challenge cognitive distortions in real-time.     Relaxation Techniques  1. Pt can benefit from learning and practicing Relaxation Techniques to reduce physiological arousal, including flight/flight/freeze responses, manifested by muscle tightness or tension; emotions including anxiety and panic, sadness and depression, and anger.  2. Relaxation techniques can include any or all of the following: Diaphragmatic Breathing (DB), Progressive Muscle Relaxation (PMR), Imaginal Relaxation (IMR), or mindfulness.  3. Relaxation techniques will be learned and practiced within sessions, and assigned for homework between sessions. This will entail interventions such using monitoring forms to track their stress and other symptoms, and assess the efficacy of their implementation of relaxation techniques.   Exposure Therapy Interventions   1. Pt can benefit from learning/continuing exposure therapy interventions, involving confronting situational, emotional, physiological, and cognitive avoided situations.   2. Pt can benefit from reducing and eliminating safety behaviors/objects, such as maladaptive response/ritualization prevention, removing "just in case" items that maintain anxiety, panic, obsessive, and post-traumatic symptoms.   3. Exposure therapy involves any or all of these modalities: in vivo, imaginal, interoceptive, Virtual Reality, written/narrative, and analog in vivo exposure   4. Exposures will be conducted within psychotherapy sessions and assigned for homework between sessions

## 2024-08-19 NOTE — REASON FOR VISIT
[Patient preference] : as per patient preference [Continuity of care] : to ensure continuity of care [Continuing, patient not seen in-person within last 12 months (provide details below)] : Telehealth services are continuing, patient not seen in-person within last 12 months.  [Telehealth (audio & video) - Individual/Group] : This visit was provided via telehealth using real-time 2-way audio visual technology. [Other Location: e.g. Home (Enter Location, City,State)___] : The provider was located at [unfilled]. [Home] : The patient, [unfilled], was located at home, [unfilled], at the time of the visit. [FreeTextEntry4] : 10:00AM [FreeTextEntry5] : 11:00AM [Patient] : Patient [FreeTextEntry1] : anxiety

## 2024-08-19 NOTE — END OF VISIT
[Duration of Psychotherapy Visit (minutes spent in synchronous communication): ____] : Duration of Psychotherapy Visit (minutes spent in synchronous communication): [unfilled] [Individual Psychotherapy for 53+ minutes] : Individual Psychotherapy for 53+ minutes  [Teletherapy Service Provided] : The services provided in this session were delivered via tele-therapy [FreeTextEntry3] : Home [FreeTextEntry5] : Remote office via [facility]

## 2024-08-29 ENCOUNTER — APPOINTMENT (OUTPATIENT)
Dept: PSYCHIATRY | Facility: CLINIC | Age: 54
End: 2024-08-29
Payer: MEDICAID

## 2024-08-29 PROCEDURE — 90837 PSYTX W PT 60 MINUTES: CPT | Mod: 95

## 2024-08-30 NOTE — PLAN
[Cognitive and/or Behavior Therapy] : Cognitive and/or Behavior Therapy  [Psychoeducation] : Psychoeducation  [Return in ____ week(s)] : Return in [unfilled] week(s) [FreeTextEntry2] : Pt seeks to decrease anxiety symptoms.  [de-identified] : Psychologist met with Pt using teletherapy platform. Pt was at their home while psychologist was working remotely via [facility]. Pt continued to provide consent for telehealth services.   Affect was observed to be full. Mood was reported as unchanged. Pt was responsive, receptive, and engaged throughout session. Evidence of risk was neither observed nor reported.   Goals of the session were to review symptoms, identify thoughts and emotions regarding upcoming employment, formulate coping strategies/responses to utilize if symptoms surface in the future, and plan for the following week. Cognitive restructuring and mindfulness were integrated in session. Pt will continue practicing skills for hw.   [FreeTextEntry1] : Psychoeducation about Diagnosis & Treatment Model  1. Pt will learn about their symptoms and diagnosis and be able to explain it back to psychologist.  2. Pt will learn about the CBT model and be able to accurately categorize their symptoms and diagnosis in terms of cognition, emotional responses, behaviors, and physiological arousal.   Cognitive Therapy Interventions   1. Pt can benefit from learning and practicing cognitive restructuring, including learning to categorize automatic thoughts/cognitive distortions, judgments, biases, and attributions   2. Pt can benefit from using cognitive reframing to challenge cognitive distortions to increase flexibility in thinking   3. Pt can benefit from using self-coping statements to encourage themselves and provide validation   4. Cognitive restructuring will be learned and practiced within session, and assigned for homework between sessions. This will entail interventions such as mood monitoring and thought record forms, to challenge cognitive distortions in real-time.     Relaxation Techniques  1. Pt can benefit from learning and practicing Relaxation Techniques to reduce physiological arousal, including flight/flight/freeze responses, manifested by muscle tightness or tension; emotions including anxiety and panic, sadness and depression, and anger.  2. Relaxation techniques can include any or all of the following: Diaphragmatic Breathing (DB), Progressive Muscle Relaxation (PMR), Imaginal Relaxation (IMR), or mindfulness.  3. Relaxation techniques will be learned and practiced within sessions, and assigned for homework between sessions. This will entail interventions such using monitoring forms to track their stress and other symptoms, and assess the efficacy of their implementation of relaxation techniques.   Exposure Therapy Interventions   1. Pt can benefit from learning/continuing exposure therapy interventions, involving confronting situational, emotional, physiological, and cognitive avoided situations.   2. Pt can benefit from reducing and eliminating safety behaviors/objects, such as maladaptive response/ritualization prevention, removing "just in case" items that maintain anxiety, panic, obsessive, and post-traumatic symptoms.   3. Exposure therapy involves any or all of these modalities: in vivo, imaginal, interoceptive, Virtual Reality, written/narrative, and analog in vivo exposure   4. Exposures will be conducted within psychotherapy sessions and assigned for homework between sessions

## 2024-08-30 NOTE — REASON FOR VISIT
[Patient preference] : as per patient preference [Continuity of care] : to ensure continuity of care [Continuing, patient not seen in-person within last 12 months (provide details below)] : Telehealth services are continuing, patient not seen in-person within last 12 months.  [Telehealth (audio & video) - Individual/Group] : This visit was provided via telehealth using real-time 2-way audio visual technology. [Other Location: e.g. Home (Enter Location, City,State)___] : The provider was located at [unfilled]. [Home] : The patient, [unfilled], was located at home, [unfilled], at the time of the visit. [Patient] : Patient [FreeTextEntry4] : 10:00AM [FreeTextEntry5] : 11:00AM [FreeTextEntry1] : anxiety

## 2024-08-30 NOTE — END OF VISIT
[Duration of Psychotherapy Visit (minutes spent in synchronous communication): ____] : Duration of Psychotherapy Visit (minutes spent in synchronous communication): [unfilled] [Individual Psychotherapy for 53+ minutes] : Individual Psychotherapy for 53+ minutes  [Teletherapy Service Provided] : The services provided in this session were delivered via tele-therapy [FreeTextEntry3] : Home [FreeTextEntry5] : Remote office via [facility]

## 2024-08-30 NOTE — END OF VISIT
[Duration of Psychotherapy Visit (minutes spent in synchronous communication): ____] : Duration of Psychotherapy Visit (minutes spent in synchronous communication): [unfilled] [Individual Psychotherapy for 53+ minutes] : Individual Psychotherapy for 53+ minutes  [Teletherapy Service Provided] : The services provided in this session were delivered via tele-therapy [FreeTextEntry3] : Home [FreeTextEntry5] : Remote office via [facility]

## 2024-08-30 NOTE — PLAN
[Cognitive and/or Behavior Therapy] : Cognitive and/or Behavior Therapy  [Psychoeducation] : Psychoeducation  [Return in ____ week(s)] : Return in [unfilled] week(s) [FreeTextEntry2] : Pt seeks to decrease anxiety symptoms.  [de-identified] : Psychologist met with Pt using teletherapy platform. Pt was at their home while psychologist was working remotely via [facility]. Pt continued to provide consent for telehealth services.   Affect was observed to be full. Mood was reported as unchanged. Pt was responsive, receptive, and engaged throughout session. Evidence of risk was neither observed nor reported.   Goals of the session were to review symptoms, identify thoughts and emotions regarding upcoming employment, formulate coping strategies/responses to utilize if symptoms surface in the future, and plan for the following week. Cognitive restructuring and mindfulness were integrated in session. Pt will continue practicing skills for hw.   [FreeTextEntry1] : Psychoeducation about Diagnosis & Treatment Model  1. Pt will learn about their symptoms and diagnosis and be able to explain it back to psychologist.  2. Pt will learn about the CBT model and be able to accurately categorize their symptoms and diagnosis in terms of cognition, emotional responses, behaviors, and physiological arousal.   Cognitive Therapy Interventions   1. Pt can benefit from learning and practicing cognitive restructuring, including learning to categorize automatic thoughts/cognitive distortions, judgments, biases, and attributions   2. Pt can benefit from using cognitive reframing to challenge cognitive distortions to increase flexibility in thinking   3. Pt can benefit from using self-coping statements to encourage themselves and provide validation   4. Cognitive restructuring will be learned and practiced within session, and assigned for homework between sessions. This will entail interventions such as mood monitoring and thought record forms, to challenge cognitive distortions in real-time.     Relaxation Techniques  1. Pt can benefit from learning and practicing Relaxation Techniques to reduce physiological arousal, including flight/flight/freeze responses, manifested by muscle tightness or tension; emotions including anxiety and panic, sadness and depression, and anger.  2. Relaxation techniques can include any or all of the following: Diaphragmatic Breathing (DB), Progressive Muscle Relaxation (PMR), Imaginal Relaxation (IMR), or mindfulness.  3. Relaxation techniques will be learned and practiced within sessions, and assigned for homework between sessions. This will entail interventions such using monitoring forms to track their stress and other symptoms, and assess the efficacy of their implementation of relaxation techniques.   Exposure Therapy Interventions   1. Pt can benefit from learning/continuing exposure therapy interventions, involving confronting situational, emotional, physiological, and cognitive avoided situations.   2. Pt can benefit from reducing and eliminating safety behaviors/objects, such as maladaptive response/ritualization prevention, removing "just in case" items that maintain anxiety, panic, obsessive, and post-traumatic symptoms.   3. Exposure therapy involves any or all of these modalities: in vivo, imaginal, interoceptive, Virtual Reality, written/narrative, and analog in vivo exposure   4. Exposures will be conducted within psychotherapy sessions and assigned for homework between sessions

## 2024-09-02 ENCOUNTER — RX RENEWAL (OUTPATIENT)
Age: 54
End: 2024-09-02

## 2024-09-05 ENCOUNTER — APPOINTMENT (OUTPATIENT)
Dept: PSYCHIATRY | Facility: CLINIC | Age: 54
End: 2024-09-05
Payer: MEDICAID

## 2024-09-05 PROCEDURE — 90837 PSYTX W PT 60 MINUTES: CPT | Mod: 95

## 2024-09-06 NOTE — PLAN
[Cognitive and/or Behavior Therapy] : Cognitive and/or Behavior Therapy  [Psychoeducation] : Psychoeducation  [Return in ____ week(s)] : Return in [unfilled] week(s) [FreeTextEntry2] : Pt seeks to decrease anxiety symptoms.  [de-identified] : Psychologist met with Pt using teletherapy platform. Pt was at their home while psychologist was working remotely via [facility]. Pt continued to provide consent for telehealth services.   Affect was observed to be full. Mood was reported as unchanged. Pt was responsive, receptive, and engaged throughout session. Evidence of risk was neither observed nor reported.   Goals of the session were to review tasks completed, revisit thoughts, feelings, and coping behaviors regarding upcoming employment, and discuss plan for care. Pt reported continued progress. Pt and Psychologist agreed on discharge from CBT program in 3-4 weeks.    [FreeTextEntry1] : Psychoeducation about Diagnosis & Treatment Model  1. Pt will learn about their symptoms and diagnosis and be able to explain it back to psychologist.  2. Pt will learn about the CBT model and be able to accurately categorize their symptoms and diagnosis in terms of cognition, emotional responses, behaviors, and physiological arousal.   Cognitive Therapy Interventions   1. Pt can benefit from learning and practicing cognitive restructuring, including learning to categorize automatic thoughts/cognitive distortions, judgments, biases, and attributions   2. Pt can benefit from using cognitive reframing to challenge cognitive distortions to increase flexibility in thinking   3. Pt can benefit from using self-coping statements to encourage themselves and provide validation   4. Cognitive restructuring will be learned and practiced within session, and assigned for homework between sessions. This will entail interventions such as mood monitoring and thought record forms, to challenge cognitive distortions in real-time.     Relaxation Techniques  1. Pt can benefit from learning and practicing Relaxation Techniques to reduce physiological arousal, including flight/flight/freeze responses, manifested by muscle tightness or tension; emotions including anxiety and panic, sadness and depression, and anger.  2. Relaxation techniques can include any or all of the following: Diaphragmatic Breathing (DB), Progressive Muscle Relaxation (PMR), Imaginal Relaxation (IMR), or mindfulness.  3. Relaxation techniques will be learned and practiced within sessions, and assigned for homework between sessions. This will entail interventions such using monitoring forms to track their stress and other symptoms, and assess the efficacy of their implementation of relaxation techniques.   Exposure Therapy Interventions   1. Pt can benefit from learning/continuing exposure therapy interventions, involving confronting situational, emotional, physiological, and cognitive avoided situations.   2. Pt can benefit from reducing and eliminating safety behaviors/objects, such as maladaptive response/ritualization prevention, removing "just in case" items that maintain anxiety, panic, obsessive, and post-traumatic symptoms.   3. Exposure therapy involves any or all of these modalities: in vivo, imaginal, interoceptive, Virtual Reality, written/narrative, and analog in vivo exposure   4. Exposures will be conducted within psychotherapy sessions and assigned for homework between sessions

## 2024-09-06 NOTE — END OF VISIT
[Duration of Psychotherapy Visit (minutes spent in synchronous communication): ____] : Duration of Psychotherapy Visit (minutes spent in synchronous communication): [unfilled] [Individual Psychotherapy for 53+ minutes] : Individual Psychotherapy for 53+ minutes  [Teletherapy Service Provided] : The services provided in this session were delivered via tele-therapy [FreeTextEntry3] : Home [FreeTextEntry5] : Remote office via [facility]

## 2024-09-12 ENCOUNTER — APPOINTMENT (OUTPATIENT)
Dept: PSYCHIATRY | Facility: CLINIC | Age: 54
End: 2024-09-12
Payer: MEDICAID

## 2024-09-12 PROCEDURE — 90837 PSYTX W PT 60 MINUTES: CPT | Mod: 95

## 2024-09-13 NOTE — PLAN
[Cognitive and/or Behavior Therapy] : Cognitive and/or Behavior Therapy  [Psychoeducation] : Psychoeducation  [Return in ____ week(s)] : Return in [unfilled] week(s) [FreeTextEntry2] : Pt seeks to decrease anxiety symptoms.  [de-identified] : Psychologist met with Pt using teletherapy platform. Pt was at their home while psychologist was working remotely via [facility]. Pt continued to provide consent for telehealth services.   Affect was observed to be full. Mood was reported as unchanged. Pt was responsive, receptive, and engaged throughout session. Evidence of risk was neither observed nor reported.   Goals of the session were to review Pt coping, identify thoughts and feelings, as well as Pt goals regarding relationships, and planning for the following week. Pt and Psychologist agreed to meet for two more sessions prior to discharge from the CBT practice due to symptom reduction.   [FreeTextEntry1] : Psychoeducation about Diagnosis & Treatment Model  1. Pt will learn about their symptoms and diagnosis and be able to explain it back to psychologist.  2. Pt will learn about the CBT model and be able to accurately categorize their symptoms and diagnosis in terms of cognition, emotional responses, behaviors, and physiological arousal.   Cognitive Therapy Interventions   1. Pt can benefit from learning and practicing cognitive restructuring, including learning to categorize automatic thoughts/cognitive distortions, judgments, biases, and attributions   2. Pt can benefit from using cognitive reframing to challenge cognitive distortions to increase flexibility in thinking   3. Pt can benefit from using self-coping statements to encourage themselves and provide validation   4. Cognitive restructuring will be learned and practiced within session, and assigned for homework between sessions. This will entail interventions such as mood monitoring and thought record forms, to challenge cognitive distortions in real-time.     Relaxation Techniques  1. Pt can benefit from learning and practicing Relaxation Techniques to reduce physiological arousal, including flight/flight/freeze responses, manifested by muscle tightness or tension; emotions including anxiety and panic, sadness and depression, and anger.  2. Relaxation techniques can include any or all of the following: Diaphragmatic Breathing (DB), Progressive Muscle Relaxation (PMR), Imaginal Relaxation (IMR), or mindfulness.  3. Relaxation techniques will be learned and practiced within sessions, and assigned for homework between sessions. This will entail interventions such using monitoring forms to track their stress and other symptoms, and assess the efficacy of their implementation of relaxation techniques.   Exposure Therapy Interventions   1. Pt can benefit from learning/continuing exposure therapy interventions, involving confronting situational, emotional, physiological, and cognitive avoided situations.   2. Pt can benefit from reducing and eliminating safety behaviors/objects, such as maladaptive response/ritualization prevention, removing "just in case" items that maintain anxiety, panic, obsessive, and post-traumatic symptoms.   3. Exposure therapy involves any or all of these modalities: in vivo, imaginal, interoceptive, Virtual Reality, written/narrative, and analog in vivo exposure   4. Exposures will be conducted within psychotherapy sessions and assigned for homework between sessions

## 2024-09-13 NOTE — END OF VISIT
[Duration of Psychotherapy Visit (minutes spent in synchronous communication): ____] : Duration of Psychotherapy Visit (minutes spent in synchronous communication): [unfilled] [Individual Psychotherapy for 53+ minutes] : Individual Psychotherapy for 53+ minutes  [Teletherapy Service Provided] : The services provided in this session were delivered via tele-therapy [FreeTextEntry3] : Home [FreeTextEntry5] : Remote office via [facility]

## 2024-09-19 ENCOUNTER — APPOINTMENT (OUTPATIENT)
Dept: PSYCHIATRY | Facility: CLINIC | Age: 54
End: 2024-09-19
Payer: MEDICAID

## 2024-09-19 PROCEDURE — 90837 PSYTX W PT 60 MINUTES: CPT | Mod: 95

## 2024-09-19 NOTE — PLAN
[FreeTextEntry2] : Pt seeks to decrease anxiety symptoms.  [Cognitive and/or Behavior Therapy] : Cognitive and/or Behavior Therapy  [Psychoeducation] : Psychoeducation  [de-identified] : Psychologist met with Pt using teletherapy platform. Pt was at their home while psychologist was working remotely via [facility]. Pt continued to provide consent for telehealth services.   Affect was observed to be full. Mood was reported as unchanged. Pt was responsive, receptive, and engaged throughout session. Evidence of risk was neither observed nor reported.   Goals of the session were to review emotions associated with upcoming changes/transitions (e.g., starting new work position), discuss Pt coping skills, and revisit plan for discharge. Pt and Psychologist agreed on discharge following the next appointment.  [Return in ____ week(s)] : Return in [unfilled] week(s) [FreeTextEntry1] : Psychoeducation about Diagnosis & Treatment Model  1. Pt will learn about their symptoms and diagnosis and be able to explain it back to psychologist.  2. Pt will learn about the CBT model and be able to accurately categorize their symptoms and diagnosis in terms of cognition, emotional responses, behaviors, and physiological arousal.   Cognitive Therapy Interventions   1. Pt can benefit from learning and practicing cognitive restructuring, including learning to categorize automatic thoughts/cognitive distortions, judgments, biases, and attributions   2. Pt can benefit from using cognitive reframing to challenge cognitive distortions to increase flexibility in thinking   3. Pt can benefit from using self-coping statements to encourage themselves and provide validation   4. Cognitive restructuring will be learned and practiced within session, and assigned for homework between sessions. This will entail interventions such as mood monitoring and thought record forms, to challenge cognitive distortions in real-time.     Relaxation Techniques  1. Pt can benefit from learning and practicing Relaxation Techniques to reduce physiological arousal, including flight/flight/freeze responses, manifested by muscle tightness or tension; emotions including anxiety and panic, sadness and depression, and anger.  2. Relaxation techniques can include any or all of the following: Diaphragmatic Breathing (DB), Progressive Muscle Relaxation (PMR), Imaginal Relaxation (IMR), or mindfulness.  3. Relaxation techniques will be learned and practiced within sessions, and assigned for homework between sessions. This will entail interventions such using monitoring forms to track their stress and other symptoms, and assess the efficacy of their implementation of relaxation techniques.   Exposure Therapy Interventions   1. Pt can benefit from learning/continuing exposure therapy interventions, involving confronting situational, emotional, physiological, and cognitive avoided situations.   2. Pt can benefit from reducing and eliminating safety behaviors/objects, such as maladaptive response/ritualization prevention, removing "just in case" items that maintain anxiety, panic, obsessive, and post-traumatic symptoms.   3. Exposure therapy involves any or all of these modalities: in vivo, imaginal, interoceptive, Virtual Reality, written/narrative, and analog in vivo exposure   4. Exposures will be conducted within psychotherapy sessions and assigned for homework between sessions

## 2024-09-24 ENCOUNTER — APPOINTMENT (OUTPATIENT)
Dept: CARDIOLOGY | Facility: CLINIC | Age: 54
End: 2024-09-24
Payer: MEDICAID

## 2024-09-24 DIAGNOSIS — I25.10 ATHEROSCLEROTIC HEART DISEASE OF NATIVE CORONARY ARTERY W/OUT ANGINA PECTORIS: ICD-10-CM

## 2024-09-24 DIAGNOSIS — E74.39 OTHER DISORDERS OF INTESTINAL CARBOHYDRATE ABSORPTION: ICD-10-CM

## 2024-09-24 PROCEDURE — G2211 COMPLEX E/M VISIT ADD ON: CPT | Mod: NC,95

## 2024-09-24 PROCEDURE — 99214 OFFICE O/P EST MOD 30 MIN: CPT | Mod: 95

## 2024-09-26 ENCOUNTER — APPOINTMENT (OUTPATIENT)
Dept: PSYCHIATRY | Facility: CLINIC | Age: 54
End: 2024-09-26
Payer: MEDICAID

## 2024-09-26 PROCEDURE — 90834 PSYTX W PT 45 MINUTES: CPT | Mod: 95

## 2024-09-27 NOTE — END OF VISIT
[Duration of Psychotherapy Visit (minutes spent in synchronous communication): ____] : Duration of Psychotherapy Visit (minutes spent in synchronous communication): [unfilled] [Individual Psychotherapy for 53+ minutes] : Individual Psychotherapy for 53+ minutes  [Teletherapy Service Provided] : The services provided in this session were delivered via tele-therapy [Individual Psychotherapy for 38-52 minutes] : Individual Psychotherapy for 38 - 52 minutes [FreeTextEntry3] : Home [FreeTextEntry5] : Remote office via [facility]

## 2024-09-27 NOTE — PLAN
[Cognitive and/or Behavior Therapy] : Cognitive and/or Behavior Therapy  [Psychoeducation] : Psychoeducation  [Return in ____ week(s)] : Return in [unfilled] week(s) [FreeTextEntry2] : Pt seeks to decrease anxiety symptoms.  [de-identified] : Psychologist met with Pt using teletherapy platform. Pt was at their home while psychologist was working remotely via [facility]. Pt continued to provide consent for telehealth services.   Affect was observed to be full. Mood was reported as unchanged. Pt was responsive, receptive, and engaged throughout session. Evidence of risk was neither observed nor reported.   Pt and Psychologist agreed the current session be the final appointment due to symptom reduction and reaching treatment goals. Session included summarization of treatment and discussion about a plan to continue practicing skills, Psychologist offered professional organization resources if needed in the future, including links to Association for Behavioral and Cognitive Therapies (ABCT) and Anxiety and Depression Association of [place] (ADAA). A link to the workbook "Mastery of Your Anxiety and Worry" was also offered in case symptoms resurface in the future. Pt will be discharged at this time. [FreeTextEntry1] : Psychoeducation about Diagnosis & Treatment Model  1. Pt will learn about their symptoms and diagnosis and be able to explain it back to psychologist.  2. Pt will learn about the CBT model and be able to accurately categorize their symptoms and diagnosis in terms of cognition, emotional responses, behaviors, and physiological arousal.   Cognitive Therapy Interventions   1. Pt can benefit from learning and practicing cognitive restructuring, including learning to categorize automatic thoughts/cognitive distortions, judgments, biases, and attributions   2. Pt can benefit from using cognitive reframing to challenge cognitive distortions to increase flexibility in thinking   3. Pt can benefit from using self-coping statements to encourage themselves and provide validation   4. Cognitive restructuring will be learned and practiced within session, and assigned for homework between sessions. This will entail interventions such as mood monitoring and thought record forms, to challenge cognitive distortions in real-time.     Relaxation Techniques  1. Pt can benefit from learning and practicing Relaxation Techniques to reduce physiological arousal, including flight/flight/freeze responses, manifested by muscle tightness or tension; emotions including anxiety and panic, sadness and depression, and anger.  2. Relaxation techniques can include any or all of the following: Diaphragmatic Breathing (DB), Progressive Muscle Relaxation (PMR), Imaginal Relaxation (IMR), or mindfulness.  3. Relaxation techniques will be learned and practiced within sessions, and assigned for homework between sessions. This will entail interventions such using monitoring forms to track their stress and other symptoms, and assess the efficacy of their implementation of relaxation techniques.   Exposure Therapy Interventions   1. Pt can benefit from learning/continuing exposure therapy interventions, involving confronting situational, emotional, physiological, and cognitive avoided situations.   2. Pt can benefit from reducing and eliminating safety behaviors/objects, such as maladaptive response/ritualization prevention, removing "just in case" items that maintain anxiety, panic, obsessive, and post-traumatic symptoms.   3. Exposure therapy involves any or all of these modalities: in vivo, imaginal, interoceptive, Virtual Reality, written/narrative, and analog in vivo exposure   4. Exposures will be conducted within psychotherapy sessions and assigned for homework between sessions

## 2024-09-27 NOTE — PLAN
[Cognitive and/or Behavior Therapy] : Cognitive and/or Behavior Therapy  [Psychoeducation] : Psychoeducation  [Return in ____ week(s)] : Return in [unfilled] week(s) [FreeTextEntry2] : Pt seeks to decrease anxiety symptoms.  [de-identified] : Psychologist met with Pt using teletherapy platform. Pt was at their home while psychologist was working remotely via [facility]. Pt continued to provide consent for telehealth services.   Affect was observed to be full. Mood was reported as unchanged. Pt was responsive, receptive, and engaged throughout session. Evidence of risk was neither observed nor reported.   Pt and Psychologist agreed the current session be the final appointment due to symptom reduction and reaching treatment goals. Session included summarization of treatment and discussion about a plan to continue practicing skills, Psychologist offered professional organization resources if needed in the future, including links to Association for Behavioral and Cognitive Therapies (ABCT) and Anxiety and Depression Association of [place] (ADAA). A link to the workbook "Mastery of Your Anxiety and Worry" was also offered in case symptoms resurface in the future. Pt will be discharged at this time. [FreeTextEntry1] : Psychoeducation about Diagnosis & Treatment Model  1. Pt will learn about their symptoms and diagnosis and be able to explain it back to psychologist.  2. Pt will learn about the CBT model and be able to accurately categorize their symptoms and diagnosis in terms of cognition, emotional responses, behaviors, and physiological arousal.   Cognitive Therapy Interventions   1. Pt can benefit from learning and practicing cognitive restructuring, including learning to categorize automatic thoughts/cognitive distortions, judgments, biases, and attributions   2. Pt can benefit from using cognitive reframing to challenge cognitive distortions to increase flexibility in thinking   3. Pt can benefit from using self-coping statements to encourage themselves and provide validation   4. Cognitive restructuring will be learned and practiced within session, and assigned for homework between sessions. This will entail interventions such as mood monitoring and thought record forms, to challenge cognitive distortions in real-time.     Relaxation Techniques  1. Pt can benefit from learning and practicing Relaxation Techniques to reduce physiological arousal, including flight/flight/freeze responses, manifested by muscle tightness or tension; emotions including anxiety and panic, sadness and depression, and anger.  2. Relaxation techniques can include any or all of the following: Diaphragmatic Breathing (DB), Progressive Muscle Relaxation (PMR), Imaginal Relaxation (IMR), or mindfulness.  3. Relaxation techniques will be learned and practiced within sessions, and assigned for homework between sessions. This will entail interventions such using monitoring forms to track their stress and other symptoms, and assess the efficacy of their implementation of relaxation techniques.   Exposure Therapy Interventions   1. Pt can benefit from learning/continuing exposure therapy interventions, involving confronting situational, emotional, physiological, and cognitive avoided situations.   2. Pt can benefit from reducing and eliminating safety behaviors/objects, such as maladaptive response/ritualization prevention, removing "just in case" items that maintain anxiety, panic, obsessive, and post-traumatic symptoms.   3. Exposure therapy involves any or all of these modalities: in vivo, imaginal, interoceptive, Virtual Reality, written/narrative, and analog in vivo exposure   4. Exposures will be conducted within psychotherapy sessions and assigned for homework between sessions

## 2024-10-07 ENCOUNTER — RX RENEWAL (OUTPATIENT)
Age: 54
End: 2024-10-07

## 2024-10-09 ENCOUNTER — APPOINTMENT (OUTPATIENT)
Dept: INFECTIOUS DISEASE | Facility: CLINIC | Age: 54
End: 2024-10-09

## 2024-10-09 ENCOUNTER — APPOINTMENT (OUTPATIENT)
Dept: INFECTIOUS DISEASE | Facility: CLINIC | Age: 54
End: 2024-10-09
Payer: MEDICAID

## 2024-10-09 VITALS
SYSTOLIC BLOOD PRESSURE: 135 MMHG | TEMPERATURE: 98.9 F | HEART RATE: 85 BPM | HEIGHT: 75 IN | DIASTOLIC BLOOD PRESSURE: 84 MMHG | BODY MASS INDEX: 28.72 KG/M2 | OXYGEN SATURATION: 98 % | WEIGHT: 231 LBS

## 2024-10-09 DIAGNOSIS — Z92.89 PERSONAL HISTORY OF OTHER MEDICAL TREATMENT: ICD-10-CM

## 2024-10-09 DIAGNOSIS — B20 HUMAN IMMUNODEFICIENCY VIRUS [HIV] DISEASE: ICD-10-CM

## 2024-10-09 PROCEDURE — 99214 OFFICE O/P EST MOD 30 MIN: CPT | Mod: 25

## 2024-10-09 PROCEDURE — 90656 IIV3 VACC NO PRSV 0.5 ML IM: CPT

## 2024-10-09 PROCEDURE — G0008: CPT

## 2024-10-09 NOTE — REASON FOR VISIT
Vm left with the provider's notes. Informed to call the office if she would like a urology referral.    [Follow-Up: _____] : a [unfilled] follow-up visit

## 2024-10-10 LAB
25(OH)D3 SERPL-MCNC: 47 NG/ML
ALBUMIN SERPL ELPH-MCNC: 4.7 G/DL
ALP BLD-CCNC: 72 U/L
ALT SERPL-CCNC: 17 U/L
ANAL PAP CYTOLOGY: NORMAL
ANION GAP SERPL CALC-SCNC: 12 MMOL/L
APPEARANCE: ABNORMAL
AST SERPL-CCNC: 18 U/L
BACTERIA: NEGATIVE /HPF
BILIRUB SERPL-MCNC: 0.5 MG/DL
BILIRUBIN URINE: NEGATIVE
BLOOD URINE: NEGATIVE
BUN SERPL-MCNC: 7 MG/DL
C TRACH RRNA SPEC QL NAA+PROBE: NOT DETECTED
CALCIUM SERPL-MCNC: 9.7 MG/DL
CAST: 0 /LPF
CD3 CELLS # BLD: 972 CELLS/UL
CD3 CELLS NFR BLD: 65 %
CD3+CD4+ CELLS # BLD: 292 CELLS/UL
CD3+CD4+ CELLS NFR BLD: 19 %
CD3+CD4+ CELLS/CD3+CD8+ CLL SPEC: 0.46 RATIO
CD3+CD8+ CELLS # SPEC: 634 CELLS/UL
CD3+CD8+ CELLS NFR BLD: 42 %
CHLORIDE SERPL-SCNC: 104 MMOL/L
CHOLEST SERPL-MCNC: 164 MG/DL
CO2 SERPL-SCNC: 24 MMOL/L
COLOR: YELLOW
CREAT SERPL-MCNC: 1.35 MG/DL
EGFR: 62 ML/MIN/1.73M2
EPITHELIAL CELLS: 0 /HPF
ESTIMATED AVERAGE GLUCOSE: 103 MG/DL
GLUCOSE QUALITATIVE U: >=1000 MG/DL
GLUCOSE SERPL-MCNC: 91 MG/DL
HBA1C MFR BLD HPLC: 5.2 %
HBV CORE IGG+IGM SER QL: NONREACTIVE
HBV SURFACE AB SER QL: NONREACTIVE
HBV SURFACE AG SER QL: NONREACTIVE
HCT VFR BLD CALC: 39.9 %
HDLC SERPL-MCNC: 44 MG/DL
HGB BLD-MCNC: 13.2 G/DL
HIV1 RNA # SERPL NAA+PROBE: NORMAL
HIV1 RNA # SERPL NAA+PROBE: NORMAL COPIES/ML
KETONES URINE: NEGATIVE MG/DL
LDLC SERPL CALC-MCNC: 97 MG/DL
LEUKOCYTE ESTERASE URINE: NEGATIVE
MCHC RBC-ENTMCNC: 28.9 PG
MCHC RBC-ENTMCNC: 33.1 GM/DL
MCV RBC AUTO: 87.3 FL
MICROSCOPIC-UA: NORMAL
N GONORRHOEA RRNA SPEC QL NAA+PROBE: NOT DETECTED
NITRITE URINE: NEGATIVE
NONHDLC SERPL-MCNC: 120 MG/DL
PH URINE: 6
PLATELET # BLD AUTO: 260 K/UL
POTASSIUM SERPL-SCNC: 4.2 MMOL/L
PROT SERPL-MCNC: 7.6 G/DL
PROTEIN URINE: NORMAL MG/DL
PSA SERPL-MCNC: <0.01 NG/ML
RBC # BLD: 4.57 M/UL
RBC # FLD: 13.9 %
RED BLOOD CELLS URINE: 0 /HPF
SODIUM SERPL-SCNC: 141 MMOL/L
SOURCE AMPLIFICATION: NORMAL
SOURCE ANAL: NORMAL
SOURCE ORAL: NORMAL
SPECIFIC GRAVITY URINE: 1.02
T PALLIDUM AB SER QL IA: NEGATIVE
TRIGL SERPL-MCNC: 129 MG/DL
TSH SERPL-ACNC: 1.09 UIU/ML
UROBILINOGEN URINE: 1 MG/DL
VIRAL LOAD INTERP: NORMAL
VIRAL LOAD LOG: NORMAL LG COP/ML
WBC # FLD AUTO: 3.49 K/UL
WHITE BLOOD CELLS URINE: 0 /HPF

## 2024-10-11 ENCOUNTER — APPOINTMENT (OUTPATIENT)
Dept: CARDIOLOGY | Facility: CLINIC | Age: 54
End: 2024-10-11
Payer: MEDICAID

## 2024-10-11 VITALS
OXYGEN SATURATION: 98 % | HEIGHT: 75 IN | HEART RATE: 68 BPM | DIASTOLIC BLOOD PRESSURE: 76 MMHG | SYSTOLIC BLOOD PRESSURE: 120 MMHG | BODY MASS INDEX: 28.35 KG/M2 | WEIGHT: 228 LBS

## 2024-10-11 DIAGNOSIS — I25.10 ATHEROSCLEROTIC HEART DISEASE OF NATIVE CORONARY ARTERY W/OUT ANGINA PECTORIS: ICD-10-CM

## 2024-10-11 DIAGNOSIS — E78.5 HYPERLIPIDEMIA, UNSPECIFIED: ICD-10-CM

## 2024-10-11 PROCEDURE — 99214 OFFICE O/P EST MOD 30 MIN: CPT

## 2024-10-11 PROCEDURE — G2211 COMPLEX E/M VISIT ADD ON: CPT | Mod: NC

## 2024-10-16 LAB — HPV DNA, HIGH RISK, LOW RISK, ANAL: DETECTED

## 2024-11-19 NOTE — HISTORY OF PRESENT ILLNESS
Abdomen , soft, nontender, nondistended , no guarding or rigidity , no masses palpable , normal bowel sounds , Liver and Spleen , no hepatosplenomegaly [de-identified] : 50 year old male follow up for sleep apnea, s/p sleep study 9/08/2020, AHI 72.  Was told to come to ENT to evaluate for possible alternative therapies such as surgery or inspire implant.  Had previously been seen January 13, 2020 and the sleep study was recommended at that time.  During fiberoptic examination he was noted to have collapse of the lateral pharyngeal walls as well as a large base of tongue.\par

## 2024-12-03 ENCOUNTER — APPOINTMENT (OUTPATIENT)
Dept: SURGERY | Facility: CLINIC | Age: 54
End: 2024-12-03
Payer: MEDICAID

## 2024-12-03 DIAGNOSIS — K62.9 DISEASE OF ANUS AND RECTUM, UNSPECIFIED: ICD-10-CM

## 2024-12-03 PROCEDURE — 46600 DIAGNOSTIC ANOSCOPY SPX: CPT

## 2024-12-03 PROCEDURE — 99213 OFFICE O/P EST LOW 20 MIN: CPT | Mod: 25

## 2024-12-10 ENCOUNTER — RX RENEWAL (OUTPATIENT)
Age: 54
End: 2024-12-10

## 2024-12-10 ENCOUNTER — TRANSCRIPTION ENCOUNTER (OUTPATIENT)
Age: 54
End: 2024-12-10

## 2024-12-17 ENCOUNTER — APPOINTMENT (OUTPATIENT)
Dept: UROLOGY | Facility: CLINIC | Age: 54
End: 2024-12-17
Payer: MEDICAID

## 2024-12-17 VITALS
TEMPERATURE: 97.5 F | BODY MASS INDEX: 27.98 KG/M2 | SYSTOLIC BLOOD PRESSURE: 158 MMHG | RESPIRATION RATE: 15 BRPM | WEIGHT: 225 LBS | HEIGHT: 75 IN | OXYGEN SATURATION: 100 % | HEART RATE: 99 BPM | DIASTOLIC BLOOD PRESSURE: 99 MMHG

## 2024-12-17 DIAGNOSIS — C61 MALIGNANT NEOPLASM OF PROSTATE: ICD-10-CM

## 2024-12-17 PROCEDURE — G2211 COMPLEX E/M VISIT ADD ON: CPT | Mod: NC

## 2024-12-17 PROCEDURE — 99213 OFFICE O/P EST LOW 20 MIN: CPT

## 2024-12-25 LAB — PSA SERPL-MCNC: <0.01 NG/ML

## 2025-01-06 ENCOUNTER — RX RENEWAL (OUTPATIENT)
Age: 55
End: 2025-01-06

## 2025-01-08 ENCOUNTER — APPOINTMENT (OUTPATIENT)
Dept: CARDIOLOGY | Facility: CLINIC | Age: 55
End: 2025-01-08
Payer: MEDICAID

## 2025-01-08 DIAGNOSIS — E74.39 OTHER DISORDERS OF INTESTINAL CARBOHYDRATE ABSORPTION: ICD-10-CM

## 2025-01-08 DIAGNOSIS — I25.10 ATHEROSCLEROTIC HEART DISEASE OF NATIVE CORONARY ARTERY W/OUT ANGINA PECTORIS: ICD-10-CM

## 2025-01-08 PROCEDURE — G2211 COMPLEX E/M VISIT ADD ON: CPT | Mod: NC,95

## 2025-01-08 PROCEDURE — 99214 OFFICE O/P EST MOD 30 MIN: CPT | Mod: 95

## 2025-01-09 ENCOUNTER — RX RENEWAL (OUTPATIENT)
Age: 55
End: 2025-01-09

## 2025-01-09 DIAGNOSIS — E55.9 VITAMIN D DEFICIENCY, UNSPECIFIED: ICD-10-CM

## 2025-01-09 DIAGNOSIS — Z86.39 PERSONAL HISTORY OF OTHER ENDOCRINE, NUTRITIONAL AND METABOLIC DISEASE: ICD-10-CM

## 2025-01-15 NOTE — ASU PATIENT PROFILE, ADULT - PREOP PAIN SCORE
Copied from CRM #[id number]. Topic: MW Medication/Rx - MW Rx Refill  >> Rayray 15, 2025  1:13 PM Belinda JEFFERSON wrote:  Keith Lazcano called to request a medication refill and is out of medications or critically low during working hours. Medication is:  Listed on Med Management list. [facility] to process refill: Selected 'Wrap Up CRM' and created new Refill Med Encounter after clicking 'Convert to Clinical Call'. Selected reason for call 'Refill Request'. Pended medication. Sent Med template and routed as high priority to [facility] MSG POOL. Readback full message.-- DO NOT REPLY / DO NOT REPLY ALL --  -- This inbox is not monitored. If this was sent to the wrong provider or department, reroute message to  [facility] Reroute pool. --  -- Message is from [facility] ([facility]) --      Message Type:  Refill Medication   Refill request for Pended medication named: lisdexamfetamine (VYVANSE) 40 MG capsule   Preferred pharmacy verified, and selected.   [id number] [place] [id number] AT [facility] [address]    Is the patient OUT of Medication?  Yes and During Work Hours Medication Refills handled by [facility] - route as high priority to [facility]IN[facility] MSG pool. Patient has been advised it will be addressed within 1 business day.     Message:                    0

## 2025-01-21 ENCOUNTER — APPOINTMENT (OUTPATIENT)
Dept: CARDIOLOGY | Facility: CLINIC | Age: 55
End: 2025-01-21
Payer: MEDICAID

## 2025-01-21 DIAGNOSIS — I25.10 ATHEROSCLEROTIC HEART DISEASE OF NATIVE CORONARY ARTERY W/OUT ANGINA PECTORIS: ICD-10-CM

## 2025-01-21 DIAGNOSIS — E78.5 HYPERLIPIDEMIA, UNSPECIFIED: ICD-10-CM

## 2025-01-21 DIAGNOSIS — I10 ESSENTIAL (PRIMARY) HYPERTENSION: ICD-10-CM

## 2025-01-21 PROCEDURE — 99214 OFFICE O/P EST MOD 30 MIN: CPT | Mod: 95

## 2025-01-21 PROCEDURE — G2211 COMPLEX E/M VISIT ADD ON: CPT | Mod: NC

## 2025-01-21 RX ORDER — NEBIVOLOL 10 MG/1
10 TABLET ORAL
Qty: 90 | Refills: 1 | Status: ACTIVE | COMMUNITY
Start: 2025-01-21 | End: 1900-01-01

## 2025-02-21 ENCOUNTER — NON-APPOINTMENT (OUTPATIENT)
Age: 55
End: 2025-02-21

## 2025-02-21 ENCOUNTER — APPOINTMENT (OUTPATIENT)
Dept: OPHTHALMOLOGY | Facility: CLINIC | Age: 55
End: 2025-02-21
Payer: MEDICAID

## 2025-02-21 PROCEDURE — 92014 COMPRE OPH EXAM EST PT 1/>: CPT

## 2025-03-05 ENCOUNTER — APPOINTMENT (OUTPATIENT)
Dept: CARDIOLOGY | Facility: CLINIC | Age: 55
End: 2025-03-05
Payer: MEDICAID

## 2025-03-05 ENCOUNTER — APPOINTMENT (OUTPATIENT)
Dept: CARDIOLOGY | Facility: CLINIC | Age: 55
End: 2025-03-05

## 2025-03-05 VITALS — WEIGHT: 220 LBS | BODY MASS INDEX: 27.5 KG/M2

## 2025-03-05 DIAGNOSIS — E66.9 OBESITY, UNSPECIFIED: ICD-10-CM

## 2025-03-05 PROCEDURE — 99214 OFFICE O/P EST MOD 30 MIN: CPT | Mod: 95

## 2025-03-05 PROCEDURE — G2211 COMPLEX E/M VISIT ADD ON: CPT | Mod: NC,95

## 2025-03-05 RX ORDER — TIRZEPATIDE 12.5 MG/.5ML
12.5 INJECTION, SOLUTION SUBCUTANEOUS WEEKLY
Qty: 1 | Refills: 0 | Status: ACTIVE | COMMUNITY
Start: 2025-03-05 | End: 1900-01-01

## 2025-03-12 ENCOUNTER — APPOINTMENT (OUTPATIENT)
Dept: CARDIOLOGY | Facility: CLINIC | Age: 55
End: 2025-03-12
Payer: MEDICAID

## 2025-03-12 ENCOUNTER — NON-APPOINTMENT (OUTPATIENT)
Age: 55
End: 2025-03-12

## 2025-03-12 VITALS
OXYGEN SATURATION: 96 % | DIASTOLIC BLOOD PRESSURE: 80 MMHG | BODY MASS INDEX: 27.5 KG/M2 | SYSTOLIC BLOOD PRESSURE: 130 MMHG | WEIGHT: 220 LBS | HEART RATE: 84 BPM

## 2025-03-12 DIAGNOSIS — E78.5 HYPERLIPIDEMIA, UNSPECIFIED: ICD-10-CM

## 2025-03-12 DIAGNOSIS — I25.10 ATHEROSCLEROTIC HEART DISEASE OF NATIVE CORONARY ARTERY W/OUT ANGINA PECTORIS: ICD-10-CM

## 2025-03-12 DIAGNOSIS — I10 ESSENTIAL (PRIMARY) HYPERTENSION: ICD-10-CM

## 2025-03-12 PROCEDURE — 93306 TTE W/DOPPLER COMPLETE: CPT

## 2025-03-12 PROCEDURE — 93000 ELECTROCARDIOGRAM COMPLETE: CPT

## 2025-03-12 PROCEDURE — 99214 OFFICE O/P EST MOD 30 MIN: CPT | Mod: 25

## 2025-03-12 PROCEDURE — G0537: CPT

## 2025-03-21 ENCOUNTER — NON-APPOINTMENT (OUTPATIENT)
Age: 55
End: 2025-03-21

## 2025-03-26 RX ORDER — TIRZEPATIDE 12.5 MG/.5ML
12.5 INJECTION, SOLUTION SUBCUTANEOUS
Qty: 1 | Refills: 1 | Status: ACTIVE | COMMUNITY
Start: 2025-03-26 | End: 1900-01-01

## 2025-03-27 ENCOUNTER — APPOINTMENT (OUTPATIENT)
Dept: UROLOGY | Facility: CLINIC | Age: 55
End: 2025-03-27
Payer: MEDICAID

## 2025-03-27 ENCOUNTER — NON-APPOINTMENT (OUTPATIENT)
Age: 55
End: 2025-03-27

## 2025-03-27 VITALS
HEIGHT: 75 IN | RESPIRATION RATE: 17 BRPM | BODY MASS INDEX: 27.98 KG/M2 | TEMPERATURE: 97.9 F | SYSTOLIC BLOOD PRESSURE: 148 MMHG | DIASTOLIC BLOOD PRESSURE: 88 MMHG | WEIGHT: 225 LBS | HEART RATE: 72 BPM

## 2025-03-27 DIAGNOSIS — C61 MALIGNANT NEOPLASM OF PROSTATE: ICD-10-CM

## 2025-03-27 PROCEDURE — 99214 OFFICE O/P EST MOD 30 MIN: CPT

## 2025-03-27 PROCEDURE — G2211 COMPLEX E/M VISIT ADD ON: CPT | Mod: NC

## 2025-04-16 ENCOUNTER — APPOINTMENT (OUTPATIENT)
Dept: CARDIOLOGY | Facility: CLINIC | Age: 55
End: 2025-04-16
Payer: MEDICAID

## 2025-04-16 VITALS — BODY MASS INDEX: 27.87 KG/M2 | WEIGHT: 223 LBS

## 2025-04-16 DIAGNOSIS — E78.5 HYPERLIPIDEMIA, UNSPECIFIED: ICD-10-CM

## 2025-04-16 DIAGNOSIS — E66.9 OBESITY, UNSPECIFIED: ICD-10-CM

## 2025-04-16 DIAGNOSIS — I10 ESSENTIAL (PRIMARY) HYPERTENSION: ICD-10-CM

## 2025-04-16 DIAGNOSIS — G47.33 OBSTRUCTIVE SLEEP APNEA (ADULT) (PEDIATRIC): ICD-10-CM

## 2025-04-16 PROCEDURE — G2211 COMPLEX E/M VISIT ADD ON: CPT | Mod: NC,95

## 2025-04-16 PROCEDURE — 99214 OFFICE O/P EST MOD 30 MIN: CPT | Mod: 95

## 2025-04-16 RX ORDER — TIRZEPATIDE 15 MG/.5ML
15 INJECTION, SOLUTION SUBCUTANEOUS
Qty: 1 | Refills: 1 | Status: ACTIVE | COMMUNITY
Start: 2025-04-16 | End: 1900-01-01

## 2025-04-17 ENCOUNTER — NON-APPOINTMENT (OUTPATIENT)
Age: 55
End: 2025-04-17

## 2025-04-18 ENCOUNTER — NON-APPOINTMENT (OUTPATIENT)
Age: 55
End: 2025-04-18

## 2025-04-18 ENCOUNTER — APPOINTMENT (OUTPATIENT)
Dept: INFECTIOUS DISEASE | Facility: CLINIC | Age: 55
End: 2025-04-18
Payer: MEDICAID

## 2025-04-18 ENCOUNTER — RX RENEWAL (OUTPATIENT)
Age: 55
End: 2025-04-18

## 2025-04-18 VITALS
OXYGEN SATURATION: 95 % | HEIGHT: 75 IN | TEMPERATURE: 97.8 F | BODY MASS INDEX: 27.73 KG/M2 | SYSTOLIC BLOOD PRESSURE: 140 MMHG | DIASTOLIC BLOOD PRESSURE: 80 MMHG | WEIGHT: 223 LBS | HEART RATE: 76 BPM

## 2025-04-18 DIAGNOSIS — Z92.89 PERSONAL HISTORY OF OTHER MEDICAL TREATMENT: ICD-10-CM

## 2025-04-18 DIAGNOSIS — B20 HUMAN IMMUNODEFICIENCY VIRUS [HIV] DISEASE: ICD-10-CM

## 2025-04-18 LAB
25(OH)D3 SERPL-MCNC: 47.7 NG/ML
25(OH)D3 SERPL-MCNC: 49.8 NG/ML
ALBUMIN SERPL ELPH-MCNC: 4.9 G/DL
ALP BLD-CCNC: 65 U/L
ALT SERPL-CCNC: 21 U/L
ANION GAP SERPL CALC-SCNC: 12 MMOL/L
APPEARANCE: CLEAR
AST SERPL-CCNC: 21 U/L
BACTERIA: NEGATIVE /HPF
BILIRUB SERPL-MCNC: 0.5 MG/DL
BILIRUBIN URINE: NEGATIVE
BLOOD URINE: NEGATIVE
BUN SERPL-MCNC: 12 MG/DL
CALCIUM SERPL-MCNC: 9.9 MG/DL
CAST: 0 /LPF
CHLORIDE SERPL-SCNC: 106 MMOL/L
CHOLEST SERPL-MCNC: 168 MG/DL
CO2 SERPL-SCNC: 25 MMOL/L
COLOR: YELLOW
CREAT SERPL-MCNC: 1.45 MG/DL
EGFRCR SERPLBLD CKD-EPI 2021: 57 ML/MIN/1.73M2
EPITHELIAL CELLS: 1 /HPF
FOLATE SERPL-MCNC: 15.5 NG/ML
GLUCOSE QUALITATIVE U: >=1000 MG/DL
GLUCOSE SERPL-MCNC: 93 MG/DL
HCT VFR BLD CALC: 41.5 %
HDLC SERPL-MCNC: 46 MG/DL
HGB BLD-MCNC: 13.8 G/DL
KETONES URINE: NEGATIVE MG/DL
LDLC SERPL-MCNC: 101 MG/DL
LEUKOCYTE ESTERASE URINE: NEGATIVE
MCHC RBC-ENTMCNC: 29.3 PG
MCHC RBC-ENTMCNC: 33.3 G/DL
MCV RBC AUTO: 88.1 FL
MICROSCOPIC-UA: NORMAL
NITRITE URINE: NEGATIVE
NONHDLC SERPL-MCNC: 122 MG/DL
PH URINE: 6
PLATELET # BLD AUTO: 214 K/UL
POTASSIUM SERPL-SCNC: 4.1 MMOL/L
PROT SERPL-MCNC: 7.7 G/DL
PROTEIN URINE: 30 MG/DL
RBC # BLD: 4.71 M/UL
RBC # FLD: 13.3 %
RED BLOOD CELLS URINE: 0 /HPF
SODIUM SERPL-SCNC: 144 MMOL/L
SPECIFIC GRAVITY URINE: 1.02
TRIGL SERPL-MCNC: 116 MG/DL
TSH SERPL-ACNC: 1.46 UIU/ML
UROBILINOGEN URINE: 0.2 MG/DL
VIT B12 SERPL-MCNC: 933 PG/ML
WBC # FLD AUTO: 3.77 K/UL
WHITE BLOOD CELLS URINE: 0 /HPF

## 2025-04-18 PROCEDURE — 99214 OFFICE O/P EST MOD 30 MIN: CPT

## 2025-04-22 LAB
C TRACH RRNA SPEC QL NAA+PROBE: NOT DETECTED
CD3 CELLS # BLD: 806 CELLS/UL
CD3 CELLS NFR BLD: 64 %
CD3+CD4+ CELLS # BLD: 281 CELLS/UL
CD3+CD4+ CELLS NFR BLD: 22 %
CD3+CD4+ CELLS/CD3+CD8+ CLL SPEC: 0.58 RATIO
CD3+CD8+ CELLS # SPEC: 486 CELLS/UL
CD3+CD8+ CELLS NFR BLD: 39 %
ESTIMATED AVERAGE GLUCOSE: 105 MG/DL
HBA1C MFR BLD HPLC: 5.3 %
HIV1 RNA # SERPL NAA+PROBE: NORMAL
HIV1 RNA # SERPL NAA+PROBE: NORMAL COPIES/ML
N GONORRHOEA RRNA SPEC QL NAA+PROBE: NOT DETECTED
SOURCE AMPLIFICATION: NORMAL
SOURCE ANAL: NORMAL
SOURCE ORAL: NORMAL
VIABILITY: NORMAL
VIRAL LOAD INTERP: NORMAL
VIRAL LOAD LOG: NORMAL LG COP/ML

## 2025-04-24 LAB — ANAL PAP CYTOLOGY: NORMAL

## 2025-05-06 DIAGNOSIS — R81 GLYCOSURIA: ICD-10-CM

## 2025-05-14 ENCOUNTER — APPOINTMENT (OUTPATIENT)
Dept: CARDIOLOGY | Facility: CLINIC | Age: 55
End: 2025-05-14

## 2025-05-29 ENCOUNTER — RX RENEWAL (OUTPATIENT)
Age: 55
End: 2025-05-29

## 2025-05-30 ENCOUNTER — RX RENEWAL (OUTPATIENT)
Age: 55
End: 2025-05-30

## 2025-06-04 ENCOUNTER — APPOINTMENT (OUTPATIENT)
Dept: CARDIOLOGY | Facility: CLINIC | Age: 55
End: 2025-06-04

## 2025-06-25 ENCOUNTER — APPOINTMENT (OUTPATIENT)
Dept: CARDIOLOGY | Facility: CLINIC | Age: 55
End: 2025-06-25

## 2025-08-01 ENCOUNTER — NON-APPOINTMENT (OUTPATIENT)
Age: 55
End: 2025-08-01

## 2025-08-13 ENCOUNTER — APPOINTMENT (OUTPATIENT)
Dept: CARDIOLOGY | Facility: CLINIC | Age: 55
End: 2025-08-13
Payer: COMMERCIAL

## 2025-08-13 ENCOUNTER — APPOINTMENT (OUTPATIENT)
Dept: CARDIOLOGY | Facility: CLINIC | Age: 55
End: 2025-08-13

## 2025-08-13 VITALS
HEART RATE: 56 BPM | OXYGEN SATURATION: 97 % | DIASTOLIC BLOOD PRESSURE: 90 MMHG | WEIGHT: 235 LBS | BODY MASS INDEX: 29.37 KG/M2 | SYSTOLIC BLOOD PRESSURE: 150 MMHG

## 2025-08-13 DIAGNOSIS — I25.10 ATHEROSCLEROTIC HEART DISEASE OF NATIVE CORONARY ARTERY W/OUT ANGINA PECTORIS: ICD-10-CM

## 2025-08-13 DIAGNOSIS — G47.33 OBSTRUCTIVE SLEEP APNEA (ADULT) (PEDIATRIC): ICD-10-CM

## 2025-08-13 DIAGNOSIS — I10 ESSENTIAL (PRIMARY) HYPERTENSION: ICD-10-CM

## 2025-08-13 DIAGNOSIS — E78.5 HYPERLIPIDEMIA, UNSPECIFIED: ICD-10-CM

## 2025-08-13 PROCEDURE — G2211 COMPLEX E/M VISIT ADD ON: CPT | Mod: NC

## 2025-08-13 PROCEDURE — 99215 OFFICE O/P EST HI 40 MIN: CPT

## 2025-08-22 ENCOUNTER — NON-APPOINTMENT (OUTPATIENT)
Age: 55
End: 2025-08-22

## 2025-08-22 ENCOUNTER — OUTPATIENT (OUTPATIENT)
Dept: OUTPATIENT SERVICES | Facility: HOSPITAL | Age: 55
LOS: 1 days | End: 2025-08-22

## 2025-08-22 ENCOUNTER — APPOINTMENT (OUTPATIENT)
Dept: INTERNAL MEDICINE | Facility: CLINIC | Age: 55
End: 2025-08-22

## 2025-08-22 DIAGNOSIS — G47.33 OBSTRUCTIVE SLEEP APNEA (ADULT) (PEDIATRIC): Chronic | ICD-10-CM

## 2025-08-22 DIAGNOSIS — M20.41 OTHER HAMMER TOE(S) (ACQUIRED), RIGHT FOOT: Chronic | ICD-10-CM

## 2025-08-22 DIAGNOSIS — Z98.890 OTHER SPECIFIED POSTPROCEDURAL STATES: Chronic | ICD-10-CM

## 2025-09-02 LAB
ALBUMIN SERPL ELPH-MCNC: 4.4 G/DL
ALP BLD-CCNC: 70 U/L
ALT SERPL-CCNC: 26 U/L
ANION GAP SERPL CALC-SCNC: 14 MMOL/L
AST SERPL-CCNC: 28 U/L
BILIRUB SERPL-MCNC: 0.7 MG/DL
BUN SERPL-MCNC: 11 MG/DL
CALCIUM SERPL-MCNC: 9.6 MG/DL
CHLORIDE SERPL-SCNC: 106 MMOL/L
CHOLEST SERPL-MCNC: 200 MG/DL
CO2 SERPL-SCNC: 25 MMOL/L
CREAT SERPL-MCNC: 1.63 MG/DL
EGFRCR SERPLBLD CKD-EPI 2021: 49 ML/MIN/1.73M2
ESTIMATED AVERAGE GLUCOSE: 126 MG/DL
GLUCOSE SERPL-MCNC: 110 MG/DL
HBA1C MFR BLD HPLC: 6 %
HCT VFR BLD CALC: 39.2 %
HDLC SERPL-MCNC: 50 MG/DL
HGB BLD-MCNC: 12.9 G/DL
LDLC SERPL-MCNC: 128 MG/DL
MCHC RBC-ENTMCNC: 28.5 PG
MCHC RBC-ENTMCNC: 32.9 G/DL
MCV RBC AUTO: 86.7 FL
NONHDLC SERPL-MCNC: 150 MG/DL
PLATELET # BLD AUTO: 220 K/UL
POTASSIUM SERPL-SCNC: 3.6 MMOL/L
PROT SERPL-MCNC: 7.2 G/DL
RBC # BLD: 4.52 M/UL
RBC # FLD: 13.2 %
SODIUM SERPL-SCNC: 145 MMOL/L
TRIGL SERPL-MCNC: 127 MG/DL
TSH SERPL-ACNC: 2.31 UIU/ML
WBC # FLD AUTO: 5.09 K/UL

## (undated) DEVICE — DRAPE IOBAN 23" X 23"

## (undated) DEVICE — XI OBTURATOR OPTICAL BLADELESS 8MM

## (undated) DEVICE — FOLEY HOLDER STATLOCK 2 WAY ADULT

## (undated) DEVICE — VENODYNE/SCD SLEEVE CALF MEDIUM

## (undated) DEVICE — GLV 7.5 PROTEXIS (CREAM) MICRO

## (undated) DEVICE — SUT MONOCRYL 4-0 27" PS-2 UNDYED

## (undated) DEVICE — POSITIONER FOAM EGG CRATE ULNAR 2PCS (PINK)

## (undated) DEVICE — DRAIN RESERVOIR FOR JACKSON PRATT 100CC CARDINAL

## (undated) DEVICE — DRILL PROX CORT QR 2.X2.4MM

## (undated) DEVICE — DRSG STOCKINETTE IMPERVIOUS XL

## (undated) DEVICE — DRSG TEGADERM 2.5X3"

## (undated) DEVICE — DRSG KERLIX ROLL 4.5"

## (undated) DEVICE — XI ARM NEEDLE DRIVER LARGE

## (undated) DEVICE — INSUFFLATION NDL COVIDIEN SURGINEEDLE VERESS 120MM

## (undated) DEVICE — MEDICATION LABELS W MARKER

## (undated) DEVICE — PREP BETADINE SPONGE STICKS

## (undated) DEVICE — DRAPE THYROID 77" X 123"

## (undated) DEVICE — POSITIONER PURPLE ARM ONE STEP (LARGE)

## (undated) DEVICE — LIJ-K WIRE TRAY (REQUIRES BILL) (IMPLANT): Type: DURABLE MEDICAL EQUIPMENT

## (undated) DEVICE — PACK ROBOTIC LIJ

## (undated) DEVICE — DRSG STERISTRIPS 0.25 X 3"

## (undated) DEVICE — DRSG ACE BANDAGE 4" NS

## (undated) DEVICE — SUT ETHILON 4-0 18" PS-2

## (undated) DEVICE — Device

## (undated) DEVICE — GLV 8 PROTEXIS (CREAM) MICRO

## (undated) DEVICE — XI ARM CLIP APPLIER LARGE

## (undated) DEVICE — SUT VLOC 90 3-0 6" CV-23 UNDYED

## (undated) DEVICE — TUBING AIRSEAL TRI-LUMEN FILTERED

## (undated) DEVICE — SOL IRR BAG H2O 3000ML

## (undated) DEVICE — XI ARM SCISSOR MONO CURVED

## (undated) DEVICE — BAG URINE W METER 2L

## (undated) DEVICE — SUT VICRYL 2-0 36" CT-1 UNDYED

## (undated) DEVICE — GLV 7.5 PROTEXIS (WHITE)

## (undated) DEVICE — DRSG MASTISOL

## (undated) DEVICE — FOLEY CATH 2-WAY 20FR 5CC SILASTIC

## (undated) DEVICE — ELCTR BOVIE TIP BLADE INSULATED 2.8" EDGE WITH SAFETY

## (undated) DEVICE — DRILL CANN QR 1.7MM LONG

## (undated) DEVICE — XI TIP COVER

## (undated) DEVICE — SUT VLOC 90 3-0 6" CV-23 VIOLET

## (undated) DEVICE — ELCTR BOVIE PENCIL SMOKE EVACUATION

## (undated) DEVICE — LUBRICATING JELLY ONESHOT 1.25OZ

## (undated) DEVICE — ELCTR GROUNDING PAD ADULT COVIDIEN

## (undated) DEVICE — XI ARM CLIP APPLIER MEDIUM-LARGE

## (undated) DEVICE — DRSG XEROFORM 1 X 8"

## (undated) DEVICE — SYR LUER LOK 10CC

## (undated) DEVICE — SUT VICRYL 2-0 27" SH UNDYED

## (undated) DEVICE — LABELS BLANK W PEN

## (undated) DEVICE — SAW BLADE STRYKER PRECISION THIN SAGITTAL MEDIUM 9MM X 25MM

## (undated) DEVICE — SUT VICRYL 3-0 18" PS-2 UNDYED

## (undated) DEVICE — WARMING BLANKET UPPER ADULT

## (undated) DEVICE — BLADE SURGICAL #15 CARBON

## (undated) DEVICE — DRAPE INSTRUMENT POUCH 6.75" X 11"

## (undated) DEVICE — SOL IRR BAG NS 0.9% 3000ML

## (undated) DEVICE — WARMING BLANKET FULL ADULT

## (undated) DEVICE — TOURNIQUET CUFF 18" DUAL PORT DUAL BLADDER W PLC (BLACK)

## (undated) DEVICE — SUT VICRYL 1 36" CT-1 UNDYED

## (undated) DEVICE — NDL HYPO REGULAR BEVEL 25G X 1.5" (BLUE)

## (undated) DEVICE — SUT POLYSORB 2-0 30" V-20 UNDYED

## (undated) DEVICE — CANISTER DISPOSABLE THIN WALL 3000CC

## (undated) DEVICE — TUBING STRYKEFLOW II SUCTION / IRRIGATOR

## (undated) DEVICE — DRSG KLING 4"

## (undated) DEVICE — PACK LIJ BASIC ORTHO

## (undated) DEVICE — BOOT CAST CANVAS ROCKER CLOSED L NAVY

## (undated) DEVICE — D HELP - CLEARVIEW CLEARIFY SYSTEM

## (undated) DEVICE — DRSG CURITY GAUZE SPONGE 4 X 4" 12-PLY

## (undated) DEVICE — XI SYNCHROSEAL VESSEL SEALER 8MM

## (undated) DEVICE — POSITIONER FOAM HEAD CRADLE (PINK)

## (undated) DEVICE — GOWN XL

## (undated) DEVICE — XI ARM FORCEP PROGRASP 8MM

## (undated) DEVICE — SPECIMEN CONTAINER 100ML

## (undated) DEVICE — FRAZIER SUCTION TIP 8FR

## (undated) DEVICE — TAPE SILK 3"

## (undated) DEVICE — DRSG COMBINE 5X9"

## (undated) DEVICE — DRSG STOCKINETTE TUBULAR 6"

## (undated) DEVICE — DRAPE SURGICAL #1010

## (undated) DEVICE — SUT VICRYL 4-0 18" PS-2 UNDYED

## (undated) DEVICE — XI ARM NEEDLE DRIVER SUTURECUT LRG 8MM

## (undated) DEVICE — TOURNIQUET ESMARK 6"

## (undated) DEVICE — SUT POLYSORB 0 60" TIES UNDYED

## (undated) DEVICE — GOWN XXXL

## (undated) DEVICE — PACK MINOR

## (undated) DEVICE — ENDOCATCH 10MM SPECIMEN POUCH

## (undated) DEVICE — FOLEY CATH 2-WAY 16FR 5CC SILICONE

## (undated) DEVICE — XI DRAPE ARM

## (undated) DEVICE — LIJ/LIA-CAMERA VIDEO C MOUNT DIGITAL 3 CHIP CAM: Type: DURABLE MEDICAL EQUIPMENT

## (undated) DEVICE — SOL IRR POUR H2O 250ML

## (undated) DEVICE — XI SEAL UNIVERSIAL 5-12MM

## (undated) DEVICE — SYR CATH TIP 2 OZ

## (undated) DEVICE — SUT POLYSORB 1 60" TIES

## (undated) DEVICE — POSITIONER PINK PAD PIGAZZI SYSTEM

## (undated) DEVICE — PACKING GAUZE PLAIN 2"

## (undated) DEVICE — PREP CHLORAPREP HI-LITE ORANGE 26ML

## (undated) DEVICE — XI DRAPE COLUMN

## (undated) DEVICE — TROCAR SURGIQUEST AIRSEAL 12MMX100MM

## (undated) DEVICE — SOL IRR POUR NS 0.9% 500ML

## (undated) DEVICE — XI ARM FORCEP MARYLAND BIPOLAR